# Patient Record
Sex: FEMALE | Race: WHITE | NOT HISPANIC OR LATINO | Employment: FULL TIME | URBAN - METROPOLITAN AREA
[De-identification: names, ages, dates, MRNs, and addresses within clinical notes are randomized per-mention and may not be internally consistent; named-entity substitution may affect disease eponyms.]

---

## 2017-01-19 ENCOUNTER — PRENATAL OFFICE VISIT - HEALTHEAST (OUTPATIENT)
Dept: FAMILY MEDICINE | Facility: CLINIC | Age: 27
End: 2017-01-19

## 2017-01-19 DIAGNOSIS — Z34.90 PREGNANCY: ICD-10-CM

## 2017-01-20 LAB — SYPHILIS RPR SCREEN - HISTORICAL: NORMAL

## 2017-02-02 ENCOUNTER — PRENATAL OFFICE VISIT - HEALTHEAST (OUTPATIENT)
Dept: FAMILY MEDICINE | Facility: CLINIC | Age: 27
End: 2017-02-02

## 2017-02-02 DIAGNOSIS — G56.00 CARPAL TUNNEL SYNDROME: ICD-10-CM

## 2017-02-02 DIAGNOSIS — Z34.90 PREGNANCY: ICD-10-CM

## 2017-02-04 ENCOUNTER — COMMUNICATION - HEALTHEAST (OUTPATIENT)
Dept: FAMILY MEDICINE | Facility: CLINIC | Age: 27
End: 2017-02-04

## 2017-02-06 ENCOUNTER — COMMUNICATION - HEALTHEAST (OUTPATIENT)
Dept: FAMILY MEDICINE | Facility: CLINIC | Age: 27
End: 2017-02-06

## 2017-02-08 ENCOUNTER — OFFICE VISIT - HEALTHEAST (OUTPATIENT)
Dept: FAMILY MEDICINE | Facility: CLINIC | Age: 27
End: 2017-02-08

## 2017-02-08 ENCOUNTER — COMMUNICATION - HEALTHEAST (OUTPATIENT)
Dept: FAMILY MEDICINE | Facility: CLINIC | Age: 27
End: 2017-02-08

## 2017-02-08 DIAGNOSIS — R05.9 COUGH: ICD-10-CM

## 2017-02-09 ENCOUNTER — AMBULATORY - HEALTHEAST (OUTPATIENT)
Dept: FAMILY MEDICINE | Facility: CLINIC | Age: 27
End: 2017-02-09

## 2017-02-09 ENCOUNTER — COMMUNICATION - HEALTHEAST (OUTPATIENT)
Dept: FAMILY MEDICINE | Facility: CLINIC | Age: 27
End: 2017-02-09

## 2017-02-16 ENCOUNTER — PRENATAL OFFICE VISIT - HEALTHEAST (OUTPATIENT)
Dept: FAMILY MEDICINE | Facility: CLINIC | Age: 27
End: 2017-02-16

## 2017-02-16 DIAGNOSIS — Z34.90 PREGNANCY: ICD-10-CM

## 2017-02-24 ENCOUNTER — RECORDS - HEALTHEAST (OUTPATIENT)
Dept: ADMINISTRATIVE | Facility: OTHER | Age: 27
End: 2017-02-24

## 2017-03-03 ENCOUNTER — COMMUNICATION - HEALTHEAST (OUTPATIENT)
Dept: FAMILY MEDICINE | Facility: CLINIC | Age: 27
End: 2017-03-03

## 2017-03-06 ENCOUNTER — PRENATAL OFFICE VISIT - HEALTHEAST (OUTPATIENT)
Dept: FAMILY MEDICINE | Facility: CLINIC | Age: 27
End: 2017-03-06

## 2017-03-06 DIAGNOSIS — Z34.90 PREGNANCY: ICD-10-CM

## 2017-03-20 ENCOUNTER — PRENATAL OFFICE VISIT - HEALTHEAST (OUTPATIENT)
Dept: FAMILY MEDICINE | Facility: CLINIC | Age: 27
End: 2017-03-20

## 2017-03-20 DIAGNOSIS — Z34.90 PREGNANCY: ICD-10-CM

## 2017-04-03 ENCOUNTER — PRENATAL OFFICE VISIT - HEALTHEAST (OUTPATIENT)
Dept: FAMILY MEDICINE | Facility: CLINIC | Age: 27
End: 2017-04-03

## 2017-04-03 DIAGNOSIS — Z34.90 PREGNANCY: ICD-10-CM

## 2017-04-10 ENCOUNTER — PRENATAL OFFICE VISIT - HEALTHEAST (OUTPATIENT)
Dept: FAMILY MEDICINE | Facility: CLINIC | Age: 27
End: 2017-04-10

## 2017-04-10 DIAGNOSIS — Z34.90 PREGNANCY: ICD-10-CM

## 2017-04-17 ENCOUNTER — PRENATAL OFFICE VISIT - HEALTHEAST (OUTPATIENT)
Dept: FAMILY MEDICINE | Facility: CLINIC | Age: 27
End: 2017-04-17

## 2017-04-17 ENCOUNTER — COMMUNICATION - HEALTHEAST (OUTPATIENT)
Dept: SCHEDULING | Facility: CLINIC | Age: 27
End: 2017-04-17

## 2017-04-17 DIAGNOSIS — Z34.90 PREGNANCY: ICD-10-CM

## 2017-04-19 ENCOUNTER — ANESTHESIA - HEALTHEAST (OUTPATIENT)
Dept: OBGYN | Facility: HOSPITAL | Age: 27
End: 2017-04-19

## 2017-04-19 ENCOUNTER — COMMUNICATION - HEALTHEAST (OUTPATIENT)
Dept: OBGYN | Facility: HOSPITAL | Age: 27
End: 2017-04-19

## 2017-04-19 ENCOUNTER — SURGERY - HEALTHEAST (OUTPATIENT)
Dept: OBGYN | Facility: HOSPITAL | Age: 27
End: 2017-04-19

## 2017-04-19 ASSESSMENT — MIFFLIN-ST. JEOR: SCORE: 1488.33

## 2017-04-24 ENCOUNTER — AMBULATORY - HEALTHEAST (OUTPATIENT)
Dept: FAMILY MEDICINE | Facility: CLINIC | Age: 27
End: 2017-04-24

## 2017-04-24 DIAGNOSIS — G56.00 CARPAL TUNNEL SYNDROME: ICD-10-CM

## 2017-04-25 ENCOUNTER — RADIANT APPOINTMENT (OUTPATIENT)
Dept: GENERAL RADIOLOGY | Facility: CLINIC | Age: 27
End: 2017-04-25
Attending: PHYSICIAN ASSISTANT
Payer: COMMERCIAL

## 2017-04-25 ENCOUNTER — COMMUNICATION - HEALTHEAST (OUTPATIENT)
Dept: SCHEDULING | Facility: CLINIC | Age: 27
End: 2017-04-25

## 2017-04-25 ENCOUNTER — OFFICE VISIT (OUTPATIENT)
Dept: URGENT CARE | Facility: URGENT CARE | Age: 27
End: 2017-04-25
Payer: COMMERCIAL

## 2017-04-25 ENCOUNTER — COMMUNICATION - HEALTHEAST (OUTPATIENT)
Dept: OBGYN | Facility: HOSPITAL | Age: 27
End: 2017-04-25

## 2017-04-25 VITALS
WEIGHT: 164 LBS | HEART RATE: 78 BPM | DIASTOLIC BLOOD PRESSURE: 81 MMHG | TEMPERATURE: 98.2 F | SYSTOLIC BLOOD PRESSURE: 122 MMHG

## 2017-04-25 DIAGNOSIS — K59.00 CONSTIPATION, UNSPECIFIED CONSTIPATION TYPE: Primary | ICD-10-CM

## 2017-04-25 DIAGNOSIS — K59.00 CONSTIPATION, UNSPECIFIED CONSTIPATION TYPE: ICD-10-CM

## 2017-04-25 PROCEDURE — 99203 OFFICE O/P NEW LOW 30 MIN: CPT | Performed by: PHYSICIAN ASSISTANT

## 2017-04-25 PROCEDURE — 74020 XR ABDOMEN 2 VW: CPT

## 2017-04-25 RX ORDER — OXYCODONE AND ACETAMINOPHEN 5; 325 MG/1; MG/1
5-325 TABLET ORAL DAILY
Refills: 0 | COMMUNITY
Start: 2017-04-22 | End: 2019-05-17

## 2017-04-25 RX ORDER — CITALOPRAM HYDROBROMIDE 40 MG/1
40 TABLET ORAL DAILY
Refills: 0 | COMMUNITY
Start: 2017-04-22 | End: 2021-09-09

## 2017-04-25 RX ORDER — HYDROXYZINE PAMOATE 50 MG/1
50 CAPSULE ORAL DAILY PRN
Refills: 0 | COMMUNITY
Start: 2017-04-22 | End: 2019-05-17

## 2017-04-25 RX ORDER — IBUPROFEN 600 MG/1
600 TABLET, FILM COATED ORAL DAILY
Refills: 0 | COMMUNITY
Start: 2017-04-22 | End: 2021-03-15

## 2017-04-25 NOTE — MR AVS SNAPSHOT
"              After Visit Summary   2017    Tracey Crawford    MRN: [id number]           Patient Information     Date Of Birth          1990        Visit Information        Provider Department      2017 7:40 PM Ester Gregory PA-C [facility]        Today's Diagnoses     Constipation, unspecified constipation type    -  1       Follow-ups after your visit        Who to contact     If you have questions or need follow up information about today's clinic visit or your schedule please contact [facility] directly at [phone].  Normal or non-critical lab and imaging results will be communicated to you by [employer], letter or phone within 4 business days after the clinic has received the results. If you do not hear from us within 7 days, please contact the clinic through [employer] or phone. If you have a critical or abnormal lab result, we will notify you by phone as soon as possible.  Submit refill requests through [employer] or call your pharmacy and they will forward the refill request to us. Please allow 3 business days for your refill to be completed.          Additional Information About Your Visit        MyChart Information     [employer] lets you send messages to your doctor, view your test results, renew your prescriptions, schedule appointments and more. To sign up, go to [email or web address] . Click on \"Log in\" on the left side of the screen, which will take you to the Welcome page. Then click on \"Sign up Now\" on the right side of the page.     You will be asked to enter the access code listed below, as well as some personal information. Please follow the directions to create your username and password.     Your access code is: [email or web address]  Expires: 2017  8:48 PM     Your access code will  in 90 days. If you need help or a new code, please call your [facility] or [phone].        Care EveryWhere ID     This is your Care EveryWhere ID. This could be " used by other organizations to access your [facility] medical records  [id number]        Your Vitals Were     Pulse Temperature                78 98.2  F (36.8  C) (Oral)           Blood Pressure from Last 3 Encounters:   04/25/17 122/81    Weight from Last 3 Encounters:   04/25/17 164 lb (74.4 kg)                 Today's Medication Changes          These changes are accurate as of: 4/25/17  8:48 PM.  If you have any questions, ask your nurse or doctor.               Start taking these medicines.        Dose/Directions    glycerin (adult) 2 G Supp Suppository   Used for:  Constipation, unspecified constipation type   Started by:  Ester Gregory PA-C        Dose:  1 suppository   Place 1 suppository rectally daily as needed for constipation   Quantity:  1 suppository   Refills:  0            Where to get your medicines      These medications were sent to [facility] [address] [place] [address] AT [address] [address]     Phone:  [phone]     glycerin (adult) 2 G Supp Suppository                Primary Care Provider    None Specified       No primary provider on file.        Thank you!     Thank you for choosing [facility]  for your care. Our goal is always to provide you with excellent care. Hearing back from our patients is one way we can continue to improve our services. Please take a few minutes to complete the written survey that you may receive in the mail after your visit with us. Thank you!             Your Updated Medication List - Protect others around you: Learn how to safely use, store and throw away your medicines at www.disposemymeds.org.          This list is accurate as of: 4/25/17  8:48 PM.  Always use your most recent med list.                   Brand Name Dispense Instructions for use    citalopram 40 MG tablet    celeXA     Take 40 mg by mouth daily       glycerin (adult) 2 G Supp Suppository      1 suppository    Place 1 suppository rectally daily as needed for constipation       hydrOXYzine 50 MG capsule    VISTARIL     Take 50 mg by mouth daily as needed       ibuprofen 600 MG tablet    ADVIL/MOTRIN     Take 600 mg by mouth daily Take 1 tablet by mouth every 6 hours for 10 days       oxyCODONE-acetaminophen 5-325 MG per tablet    PERCOCET     Take 5-325 tablets by mouth daily 1-2 Tablets by mouth every 6 hours

## 2017-04-26 ENCOUNTER — COMMUNICATION - HEALTHEAST (OUTPATIENT)
Dept: FAMILY MEDICINE | Facility: CLINIC | Age: 27
End: 2017-04-26

## 2017-04-26 NOTE — PROGRESS NOTES
SUBJECTIVE:                                                    Tracey Crawford is a 26 year old female who presents to clinic today for the following health issues:      Has not had BM since before C section on 04/19/2017, Tired today painful and unable       Duration: 04/19/2017    Description (location/character/radiation): C Section     Intensity:  mild    Accompanying signs and symptoms: None    History (similar episodes/previous evaluation): None    Precipitating or alleviating factors: None    Therapies tried and outcome: Pain Medication as given      BM 4/19 in the AM. No N/V. Is passing gas. No abdominal pain. On Ibu 600mg, Percocet, Hydroyizine pamoate. Using  About 3 Percocet a day      No Known Allergies    No past medical history on file.      No current outpatient prescriptions on file prior to visit.  No current facility-administered medications on file prior to visit.     Social History   Substance Use Topics     Smoking status: Not on file     Smokeless tobacco: Not on file     Alcohol use Not on file       ROS:  General: negative for fever  Resp: negative for chest pain   CV: negative for chest pain  ABD: as above  : negative for dysuria  Neurologic:negative for Headache    OBJECTIVE:  /81  Pulse 78  Temp 98.2  F (36.8  C) (Oral)  Wt 164 lb (74.4 kg)   General:   awake, alert, and cooperative.  NAD.   Head: Normocephalic, atraumatic.  Eyes: Conjunctiva clear, non icteric.   Heart: Regular rate and rhythm. No murmur.  Lungs: Chest is clear; no wheezes or rales.  ABD: soft, no tenderness to palpation , no rigidity, guarding or rebound , bowel sounds intact  Neuro: Alert and oriented - normal speech.   Xray- moderate stool, no obstruction  ASSESSMENT:well appearing    ICD-10-CM    1. Constipation, unspecified constipation type K59.00 XR Abdomen 2 Views       PLAN: Glycerin suppository. DC Atarax. Prune juice. Call her OB tomorrow.       Advised about symptoms which might herald more serious  problems.        Ester Gregory PA-C

## 2017-04-26 NOTE — NURSING NOTE
Chief Complaint   Patient presents with     Abdominal Pain     Has not had a BM since before C section on 04/19/2017        Initial /81  Pulse 78  Temp 98.2  F (36.8  C) (Oral)  Wt 164 lb (74.4 kg) There is no height or weight on file to calculate BMI..  BP completed using cuff size: regular        Michelle Bryant MA

## 2017-05-24 ENCOUNTER — COMMUNICATION - HEALTHEAST (OUTPATIENT)
Dept: OBGYN | Facility: HOSPITAL | Age: 27
End: 2017-05-24

## 2017-06-02 ENCOUNTER — COMMUNICATION - HEALTHEAST (OUTPATIENT)
Dept: OBGYN | Facility: HOSPITAL | Age: 27
End: 2017-06-02

## 2017-06-02 ENCOUNTER — COMMUNICATION - HEALTHEAST (OUTPATIENT)
Dept: FAMILY MEDICINE | Facility: CLINIC | Age: 27
End: 2017-06-02

## 2017-06-05 ENCOUNTER — COMMUNICATION - HEALTHEAST (OUTPATIENT)
Dept: FAMILY MEDICINE | Facility: CLINIC | Age: 27
End: 2017-06-05

## 2017-06-07 ENCOUNTER — OFFICE VISIT - HEALTHEAST (OUTPATIENT)
Dept: FAMILY MEDICINE | Facility: CLINIC | Age: 27
End: 2017-06-07

## 2017-06-07 ENCOUNTER — COMMUNICATION - HEALTHEAST (OUTPATIENT)
Dept: TELEHEALTH | Facility: CLINIC | Age: 27
End: 2017-06-07

## 2017-06-07 ENCOUNTER — COMMUNICATION - HEALTHEAST (OUTPATIENT)
Dept: HEALTH INFORMATION MANAGEMENT | Facility: CLINIC | Age: 27
End: 2017-06-07

## 2017-06-07 DIAGNOSIS — D22.9 ATYPICAL NEVI: ICD-10-CM

## 2017-06-10 LAB
HPV INTERPRETATION - HISTORICAL: NORMAL
HPV INTERPRETER - HISTORICAL: NORMAL

## 2017-06-14 ENCOUNTER — COMMUNICATION - HEALTHEAST (OUTPATIENT)
Dept: FAMILY MEDICINE | Facility: CLINIC | Age: 27
End: 2017-06-14

## 2017-06-14 LAB
BKR LAB AP ABNORMAL BLEEDING: NO
BKR LAB AP BIRTH CONTROL/HORMONES: NORMAL
BKR LAB AP CERVICAL APPEARANCE: NORMAL
BKR LAB AP GYN ADEQUACY: NORMAL
BKR LAB AP GYN INTERPRETATION: NORMAL
BKR LAB AP HPV REFLEX: NORMAL
BKR LAB AP LMP: NORMAL
BKR LAB AP PATIENT STATUS: NORMAL
BKR LAB AP PREVIOUS ABNORMAL: NO
BKR LAB AP PREVIOUS NORMAL: NORMAL
HIGH RISK?: NO
PATH REPORT.COMMENTS IMP SPEC: NORMAL
RESULT FLAG (HE HISTORICAL CONVERSION): NORMAL

## 2017-06-23 ENCOUNTER — COMMUNICATION - HEALTHEAST (OUTPATIENT)
Dept: FAMILY MEDICINE | Facility: CLINIC | Age: 27
End: 2017-06-23

## 2017-06-23 DIAGNOSIS — F32.A DEPRESSION: ICD-10-CM

## 2017-07-12 ENCOUNTER — COMMUNICATION - HEALTHEAST (OUTPATIENT)
Dept: FAMILY MEDICINE | Facility: CLINIC | Age: 27
End: 2017-07-12

## 2017-09-24 ENCOUNTER — COMMUNICATION - HEALTHEAST (OUTPATIENT)
Dept: FAMILY MEDICINE | Facility: CLINIC | Age: 27
End: 2017-09-24

## 2018-01-08 ENCOUNTER — COMMUNICATION - HEALTHEAST (OUTPATIENT)
Dept: FAMILY MEDICINE | Facility: CLINIC | Age: 28
End: 2018-01-08

## 2018-02-25 ENCOUNTER — COMMUNICATION - HEALTHEAST (OUTPATIENT)
Dept: FAMILY MEDICINE | Facility: CLINIC | Age: 28
End: 2018-02-25

## 2018-02-25 DIAGNOSIS — F32.A DEPRESSION: ICD-10-CM

## 2018-03-26 ENCOUNTER — COMMUNICATION - HEALTHEAST (OUTPATIENT)
Dept: FAMILY MEDICINE | Facility: CLINIC | Age: 28
End: 2018-03-26

## 2018-03-28 ENCOUNTER — AMBULATORY - HEALTHEAST (OUTPATIENT)
Dept: FAMILY MEDICINE | Facility: CLINIC | Age: 28
End: 2018-03-28

## 2018-04-12 ENCOUNTER — OFFICE VISIT - HEALTHEAST (OUTPATIENT)
Dept: FAMILY MEDICINE | Facility: CLINIC | Age: 28
End: 2018-04-12

## 2018-04-12 DIAGNOSIS — Z00.00 HEALTH MAINTENANCE EXAMINATION: ICD-10-CM

## 2018-06-18 ENCOUNTER — COMMUNICATION - HEALTHEAST (OUTPATIENT)
Dept: FAMILY MEDICINE | Facility: CLINIC | Age: 28
End: 2018-06-18

## 2018-06-18 DIAGNOSIS — F32.A DEPRESSION: ICD-10-CM

## 2018-07-18 ENCOUNTER — COMMUNICATION - HEALTHEAST (OUTPATIENT)
Dept: FAMILY MEDICINE | Facility: CLINIC | Age: 28
End: 2018-07-18

## 2018-09-04 ENCOUNTER — COMMUNICATION - HEALTHEAST (OUTPATIENT)
Dept: FAMILY MEDICINE | Facility: CLINIC | Age: 28
End: 2018-09-04

## 2018-10-08 ENCOUNTER — COMMUNICATION - HEALTHEAST (OUTPATIENT)
Dept: FAMILY MEDICINE | Facility: CLINIC | Age: 28
End: 2018-10-08

## 2018-10-10 ENCOUNTER — COMMUNICATION - HEALTHEAST (OUTPATIENT)
Dept: FAMILY MEDICINE | Facility: CLINIC | Age: 28
End: 2018-10-10

## 2018-10-10 ENCOUNTER — OFFICE VISIT - HEALTHEAST (OUTPATIENT)
Dept: FAMILY MEDICINE | Facility: CLINIC | Age: 28
End: 2018-10-10

## 2018-10-10 DIAGNOSIS — N92.6 MISSED MENSES: ICD-10-CM

## 2018-10-10 DIAGNOSIS — O26.859 SPOTTING AFFECTING PREGNANCY: ICD-10-CM

## 2018-10-10 LAB — HCG UR QL: POSITIVE

## 2018-10-12 LAB
B19V IGG SER IA-ACNC: 3.56 IV
B19V IGM SER IA-ACNC: 0.14 IV

## 2018-10-23 ENCOUNTER — COMMUNICATION - HEALTHEAST (OUTPATIENT)
Dept: FAMILY MEDICINE | Facility: CLINIC | Age: 28
End: 2018-10-23

## 2018-10-24 ENCOUNTER — OFFICE VISIT - HEALTHEAST (OUTPATIENT)
Dept: FAMILY MEDICINE | Facility: CLINIC | Age: 28
End: 2018-10-24

## 2018-10-24 DIAGNOSIS — R42 LIGHTHEADEDNESS: ICD-10-CM

## 2018-10-24 DIAGNOSIS — Z3A.12 12 WEEKS GESTATION OF PREGNANCY: ICD-10-CM

## 2018-10-24 LAB
BASOPHILS # BLD AUTO: 0.1 THOU/UL (ref 0–0.2)
BASOPHILS NFR BLD AUTO: 1 % (ref 0–2)
EOSINOPHIL # BLD AUTO: 0.2 THOU/UL (ref 0–0.4)
EOSINOPHIL NFR BLD AUTO: 2 % (ref 0–6)
ERYTHROCYTE [DISTWIDTH] IN BLOOD BY AUTOMATED COUNT: 11.5 % (ref 11–14.5)
HCT VFR BLD AUTO: 37.6 % (ref 35–47)
HGB BLD-MCNC: 12.9 G/DL (ref 12–16)
LYMPHOCYTES # BLD AUTO: 1.9 THOU/UL (ref 0.8–4.4)
LYMPHOCYTES NFR BLD AUTO: 20 % (ref 20–40)
MCH RBC QN AUTO: 30.3 PG (ref 27–34)
MCHC RBC AUTO-ENTMCNC: 34.2 G/DL (ref 32–36)
MCV RBC AUTO: 89 FL (ref 80–100)
MONOCYTES # BLD AUTO: 0.6 THOU/UL (ref 0–0.9)
MONOCYTES NFR BLD AUTO: 6 % (ref 2–10)
NEUTROPHILS # BLD AUTO: 6.6 THOU/UL (ref 2–7.7)
NEUTROPHILS NFR BLD AUTO: 71 % (ref 50–70)
PLATELET # BLD AUTO: 246 THOU/UL (ref 140–440)
PMV BLD AUTO: 7.4 FL (ref 7–10)
RBC # BLD AUTO: 4.24 MILL/UL (ref 3.8–5.4)
TSH SERPL DL<=0.005 MIU/L-ACNC: 1.31 UIU/ML (ref 0.3–5)
WBC: 9.3 THOU/UL (ref 4–11)

## 2018-11-02 ENCOUNTER — HOSPITAL ENCOUNTER (OUTPATIENT)
Dept: ULTRASOUND IMAGING | Facility: CLINIC | Age: 28
Discharge: HOME | End: 2018-11-02
Attending: FAMILY MEDICINE

## 2018-11-02 DIAGNOSIS — O26.859 SPOTTING AFFECTING PREGNANCY: ICD-10-CM

## 2018-11-11 ENCOUNTER — COMMUNICATION - HEALTHEAST (OUTPATIENT)
Dept: FAMILY MEDICINE | Facility: CLINIC | Age: 28
End: 2018-11-11

## 2018-11-15 ENCOUNTER — AMBULATORY - HEALTHEAST (OUTPATIENT)
Dept: FAMILY MEDICINE | Facility: CLINIC | Age: 28
End: 2018-11-15

## 2018-11-15 DIAGNOSIS — Z33.1 PREGNANT STATE, INCIDENTAL: ICD-10-CM

## 2018-11-26 ENCOUNTER — PRENATAL OFFICE VISIT - HEALTHEAST (OUTPATIENT)
Dept: FAMILY MEDICINE | Facility: CLINIC | Age: 28
End: 2018-11-26

## 2018-11-26 DIAGNOSIS — Z33.1 PREGNANCY, INCIDENTAL: ICD-10-CM

## 2018-11-26 LAB
ALBUMIN UR-MCNC: NEGATIVE MG/DL
APPEARANCE UR: CLEAR
BILIRUB UR QL STRIP: NEGATIVE
CLUE CELLS: NORMAL
COLOR UR AUTO: YELLOW
GLUCOSE UR STRIP-MCNC: NEGATIVE MG/DL
HGB UR QL STRIP: NEGATIVE
HIV 1+2 AB+HIV1 P24 AG SERPL QL IA: NEGATIVE
KETONES UR STRIP-MCNC: NEGATIVE MG/DL
LEUKOCYTE ESTERASE UR QL STRIP: NEGATIVE
NITRATE UR QL: NEGATIVE
PH UR STRIP: 6.5 [PH] (ref 5–8)
SP GR UR STRIP: 1.02 (ref 1–1.03)
TRICHOMONAS, WET PREP: NORMAL
UROBILINOGEN UR STRIP-ACNC: NORMAL
YEAST, WET PREP: NORMAL

## 2018-11-26 ASSESSMENT — MIFFLIN-ST. JEOR: SCORE: 1399.43

## 2018-11-27 LAB
ABO/RH(D): NORMAL
ABORH REPEAT: NORMAL
ANTIBODY SCREEN: NEGATIVE
BACTERIA SPEC CULT: NO GROWTH
C TRACH DNA SPEC QL PROBE+SIG AMP: NEGATIVE
HBV SURFACE AG SERPL QL IA: NEGATIVE
N GONORRHOEA DNA SPEC QL NAA+PROBE: NEGATIVE
RUBV IGG SERPL QL IA: POSITIVE
T PALLIDUM AB SER QL: NEGATIVE

## 2018-12-04 ENCOUNTER — COMMUNICATION - HEALTHEAST (OUTPATIENT)
Dept: FAMILY MEDICINE | Facility: CLINIC | Age: 28
End: 2018-12-04

## 2018-12-19 ENCOUNTER — PRENATAL OFFICE VISIT - HEALTHEAST (OUTPATIENT)
Dept: FAMILY MEDICINE | Facility: CLINIC | Age: 28
End: 2018-12-19

## 2018-12-19 ENCOUNTER — RECORDS - HEALTHEAST (OUTPATIENT)
Dept: ADMINISTRATIVE | Facility: OTHER | Age: 28
End: 2018-12-19

## 2018-12-19 DIAGNOSIS — Z3A.21 21 WEEKS GESTATION OF PREGNANCY: ICD-10-CM

## 2019-01-04 ENCOUNTER — COMMUNICATION - HEALTHEAST (OUTPATIENT)
Dept: FAMILY MEDICINE | Facility: CLINIC | Age: 29
End: 2019-01-04

## 2019-01-16 ENCOUNTER — COMMUNICATION - HEALTHEAST (OUTPATIENT)
Dept: FAMILY MEDICINE | Facility: CLINIC | Age: 29
End: 2019-01-16

## 2019-01-16 ENCOUNTER — PRENATAL OFFICE VISIT - HEALTHEAST (OUTPATIENT)
Dept: FAMILY MEDICINE | Facility: CLINIC | Age: 29
End: 2019-01-16

## 2019-01-16 DIAGNOSIS — Z34.90 PREGNANCY, UNSPECIFIED GESTATIONAL AGE: ICD-10-CM

## 2019-01-16 LAB
FASTING STATUS PATIENT QL REPORTED: YES
GLUCOSE 1H P 50 G GLC PO SERPL-MCNC: 70 MG/DL (ref 70–139)
HGB BLD-MCNC: 11.8 G/DL (ref 12–16)

## 2019-01-17 LAB — T PALLIDUM AB SER QL: NEGATIVE

## 2019-01-29 ENCOUNTER — COMMUNICATION - HEALTHEAST (OUTPATIENT)
Dept: FAMILY MEDICINE | Facility: CLINIC | Age: 29
End: 2019-01-29

## 2019-01-29 DIAGNOSIS — F32.A DEPRESSION: ICD-10-CM

## 2019-02-06 ENCOUNTER — PRENATAL OFFICE VISIT - HEALTHEAST (OUTPATIENT)
Dept: FAMILY MEDICINE | Facility: CLINIC | Age: 29
End: 2019-02-06

## 2019-02-06 DIAGNOSIS — Z34.90 PREGNANCY, UNSPECIFIED GESTATIONAL AGE: ICD-10-CM

## 2019-02-11 ENCOUNTER — COMMUNICATION - HEALTHEAST (OUTPATIENT)
Dept: FAMILY MEDICINE | Facility: CLINIC | Age: 29
End: 2019-02-11

## 2019-02-20 ENCOUNTER — PRENATAL OFFICE VISIT - HEALTHEAST (OUTPATIENT)
Dept: FAMILY MEDICINE | Facility: CLINIC | Age: 29
End: 2019-02-20

## 2019-02-20 DIAGNOSIS — Z34.90 PREGNANCY, UNSPECIFIED GESTATIONAL AGE: ICD-10-CM

## 2019-02-20 DIAGNOSIS — D22.9 ATYPICAL NEVI: ICD-10-CM

## 2019-02-20 LAB
ALBUMIN UR-MCNC: ABNORMAL MG/DL
APPEARANCE UR: CLEAR
BILIRUB UR QL STRIP: NEGATIVE
COLOR UR AUTO: YELLOW
GLUCOSE UR STRIP-MCNC: NEGATIVE MG/DL
HGB UR QL STRIP: NEGATIVE
KETONES UR STRIP-MCNC: NEGATIVE MG/DL
LEUKOCYTE ESTERASE UR QL STRIP: ABNORMAL
NITRATE UR QL: NEGATIVE
PH UR STRIP: 7 [PH] (ref 5–8)
SP GR UR STRIP: 1.02 (ref 1–1.03)
UROBILINOGEN UR STRIP-ACNC: ABNORMAL

## 2019-03-01 ENCOUNTER — OFFICE VISIT - HEALTHEAST (OUTPATIENT)
Dept: FAMILY MEDICINE | Facility: CLINIC | Age: 29
End: 2019-03-01

## 2019-03-01 ENCOUNTER — COMMUNICATION - HEALTHEAST (OUTPATIENT)
Dept: SCHEDULING | Facility: CLINIC | Age: 29
End: 2019-03-01

## 2019-03-01 DIAGNOSIS — O22.13: ICD-10-CM

## 2019-03-07 ENCOUNTER — COMMUNICATION - HEALTHEAST (OUTPATIENT)
Dept: FAMILY MEDICINE | Facility: CLINIC | Age: 29
End: 2019-03-07

## 2019-03-07 ENCOUNTER — PRENATAL OFFICE VISIT - HEALTHEAST (OUTPATIENT)
Dept: FAMILY MEDICINE | Facility: CLINIC | Age: 29
End: 2019-03-07

## 2019-03-07 DIAGNOSIS — O22.13: ICD-10-CM

## 2019-03-07 DIAGNOSIS — Z34.90 PREGNANCY, UNSPECIFIED GESTATIONAL AGE: ICD-10-CM

## 2019-03-07 LAB
ALBUMIN UR-MCNC: NEGATIVE MG/DL
APPEARANCE UR: CLEAR
BILIRUB UR QL STRIP: NEGATIVE
COLOR UR AUTO: YELLOW
GLUCOSE UR STRIP-MCNC: NEGATIVE MG/DL
HGB UR QL STRIP: NEGATIVE
KETONES UR STRIP-MCNC: NEGATIVE MG/DL
LEUKOCYTE ESTERASE UR QL STRIP: ABNORMAL
NITRATE UR QL: NEGATIVE
PH UR STRIP: 7 [PH] (ref 5–8)
SP GR UR STRIP: 1.01 (ref 1–1.03)
UROBILINOGEN UR STRIP-ACNC: ABNORMAL

## 2019-04-03 ENCOUNTER — PRENATAL OFFICE VISIT - HEALTHEAST (OUTPATIENT)
Dept: FAMILY MEDICINE | Facility: CLINIC | Age: 29
End: 2019-04-03

## 2019-04-03 DIAGNOSIS — Z34.90 PREGNANCY, UNSPECIFIED GESTATIONAL AGE: ICD-10-CM

## 2019-04-03 LAB
ALBUMIN UR-MCNC: NEGATIVE MG/DL
APPEARANCE UR: CLEAR
BILIRUB UR QL STRIP: NEGATIVE
COLOR UR AUTO: YELLOW
GLUCOSE UR STRIP-MCNC: NEGATIVE MG/DL
HGB BLD-MCNC: 12.9 G/DL (ref 12–16)
HGB UR QL STRIP: NEGATIVE
KETONES UR STRIP-MCNC: NEGATIVE MG/DL
LEUKOCYTE ESTERASE UR QL STRIP: ABNORMAL
NITRATE UR QL: NEGATIVE
PH UR STRIP: 7 [PH] (ref 5–8)
SP GR UR STRIP: 1.02 (ref 1–1.03)
UROBILINOGEN UR STRIP-ACNC: ABNORMAL

## 2019-04-04 LAB
ALLERGIC TO PENICILLIN: NO
GP B STREP DNA SPEC QL NAA+PROBE: NEGATIVE

## 2019-04-10 ENCOUNTER — PRENATAL OFFICE VISIT - HEALTHEAST (OUTPATIENT)
Dept: FAMILY MEDICINE | Facility: CLINIC | Age: 29
End: 2019-04-10

## 2019-04-10 DIAGNOSIS — Z3A.37 37 WEEKS GESTATION OF PREGNANCY: ICD-10-CM

## 2019-04-17 ENCOUNTER — PRENATAL OFFICE VISIT - HEALTHEAST (OUTPATIENT)
Dept: FAMILY MEDICINE | Facility: CLINIC | Age: 29
End: 2019-04-17

## 2019-04-17 DIAGNOSIS — Z34.90 PREGNANCY, UNSPECIFIED GESTATIONAL AGE: ICD-10-CM

## 2019-04-17 LAB
ALBUMIN UR-MCNC: NEGATIVE MG/DL
APPEARANCE UR: CLEAR
BILIRUB UR QL STRIP: NEGATIVE
COLOR UR AUTO: YELLOW
GLUCOSE UR STRIP-MCNC: NEGATIVE MG/DL
HGB UR QL STRIP: NEGATIVE
KETONES UR STRIP-MCNC: NEGATIVE MG/DL
LEUKOCYTE ESTERASE UR QL STRIP: ABNORMAL
NITRATE UR QL: NEGATIVE
PH UR STRIP: 7 [PH] (ref 5–8)
SP GR UR STRIP: 1.02 (ref 1–1.03)
UROBILINOGEN UR STRIP-ACNC: ABNORMAL

## 2019-04-24 ENCOUNTER — PRENATAL OFFICE VISIT - HEALTHEAST (OUTPATIENT)
Dept: FAMILY MEDICINE | Facility: CLINIC | Age: 29
End: 2019-04-24

## 2019-04-24 DIAGNOSIS — Z34.90 PREGNANCY, UNSPECIFIED GESTATIONAL AGE: ICD-10-CM

## 2019-04-24 DIAGNOSIS — O48.0 POST-TERM PREGNANCY, 40-42 WEEKS OF GESTATION: ICD-10-CM

## 2019-04-29 ENCOUNTER — ANESTHESIA - HEALTHEAST (OUTPATIENT)
Dept: OBGYN | Facility: HOSPITAL | Age: 29
End: 2019-04-29

## 2019-04-30 ENCOUNTER — HOME CARE/HOSPICE - HEALTHEAST (OUTPATIENT)
Dept: HOME HEALTH SERVICES | Facility: HOME HEALTH | Age: 29
End: 2019-04-30

## 2019-05-13 ENCOUNTER — AMBULATORY - HEALTHEAST (OUTPATIENT)
Dept: FAMILY MEDICINE | Facility: CLINIC | Age: 29
End: 2019-05-13

## 2019-05-13 ENCOUNTER — OFFICE VISIT - HEALTHEAST (OUTPATIENT)
Dept: FAMILY MEDICINE | Facility: CLINIC | Age: 29
End: 2019-05-13

## 2019-05-13 DIAGNOSIS — N61.0 MASTITIS: ICD-10-CM

## 2019-05-17 ENCOUNTER — OFFICE VISIT (OUTPATIENT)
Dept: URGENT CARE | Facility: URGENT CARE | Age: 29
End: 2019-05-17
Payer: COMMERCIAL

## 2019-05-17 VITALS
DIASTOLIC BLOOD PRESSURE: 79 MMHG | HEART RATE: 80 BPM | TEMPERATURE: 98.7 F | WEIGHT: 157 LBS | OXYGEN SATURATION: 100 % | SYSTOLIC BLOOD PRESSURE: 111 MMHG

## 2019-05-17 DIAGNOSIS — R30.0 DYSURIA: Primary | ICD-10-CM

## 2019-05-17 LAB
ALBUMIN UR-MCNC: NEGATIVE MG/DL
APPEARANCE UR: ABNORMAL
BACTERIA #/AREA URNS HPF: ABNORMAL /HPF
BILIRUB UR QL STRIP: NEGATIVE
COLOR UR AUTO: YELLOW
GLUCOSE UR STRIP-MCNC: NEGATIVE MG/DL
HGB UR QL STRIP: ABNORMAL
KETONES UR STRIP-MCNC: NEGATIVE MG/DL
LEUKOCYTE ESTERASE UR QL STRIP: ABNORMAL
NITRATE UR QL: NEGATIVE
NON-SQ EPI CELLS #/AREA URNS LPF: ABNORMAL /LPF
PH UR STRIP: 6.5 PH (ref 5–7)
RBC #/AREA URNS AUTO: ABNORMAL /HPF
SOURCE: ABNORMAL
SP GR UR STRIP: <=1.005 (ref 1–1.03)
TRANS CELLS #/AREA URNS HPF: ABNORMAL /HPF
UROBILINOGEN UR STRIP-ACNC: 0.2 EU/DL (ref 0.2–1)
WBC #/AREA URNS AUTO: ABNORMAL /HPF
WBC CLUMPS #/AREA URNS HPF: PRESENT /HPF

## 2019-05-17 PROCEDURE — 99213 OFFICE O/P EST LOW 20 MIN: CPT | Performed by: PHYSICIAN ASSISTANT

## 2019-05-17 PROCEDURE — 81001 URINALYSIS AUTO W/SCOPE: CPT | Performed by: PHYSICIAN ASSISTANT

## 2019-05-17 PROCEDURE — 87186 SC STD MICRODIL/AGAR DIL: CPT | Performed by: PHYSICIAN ASSISTANT

## 2019-05-17 PROCEDURE — 87088 URINE BACTERIA CULTURE: CPT | Performed by: PHYSICIAN ASSISTANT

## 2019-05-17 PROCEDURE — 87086 URINE CULTURE/COLONY COUNT: CPT | Performed by: PHYSICIAN ASSISTANT

## 2019-05-17 RX ORDER — DOCUSATE SODIUM 100 MG/1
100 CAPSULE, LIQUID FILLED ORAL
COMMUNITY
Start: 2019-05-01 | End: 2021-03-15

## 2019-05-17 RX ORDER — NITROFURANTOIN 25; 75 MG/1; MG/1
100 CAPSULE ORAL 2 TIMES DAILY
Qty: 14 CAPSULE | Refills: 0 | Status: SHIPPED | OUTPATIENT
Start: 2019-05-17 | End: 2021-03-15

## 2019-05-17 RX ORDER — DICLOXACILLIN SODIUM 500 MG
500 CAPSULE ORAL
COMMUNITY
Start: 2019-05-13 | End: 2019-05-17

## 2019-05-18 NOTE — PROGRESS NOTES
SUBJECTIVE:   Tracey Crawford is a 29 year old female who  presents today for a possible UTI. Symptoms of dysuria, urgency and frequency have been going on for 2hour(s).  Hematuria no.  sudden onsetand moderate.  There is no history of fever, chills, nausea or vomiting.  No history of vaginal discharge. This patient does  have a history of urinary tract infections. Patient denies long duration, rigors and flank pain or vaginal discharge, vaginal odor and vaginal itching. Recently delivered and was treated with dicloxacillin for mastitis recently. No breast pain currently.     History reviewed. No pertinent past medical history.  Current Outpatient Medications   Medication Sig Dispense Refill     citalopram (CELEXA) 40 MG tablet Take 40 mg by mouth daily  0     dicloxacillin (DYNAPEN) 500 MG capsule Take 500 mg by mouth       docusate sodium (COLACE) 100 MG capsule Take 100 mg by mouth       ibuprofen (ADVIL/MOTRIN) 600 MG tablet Take 600 mg by mouth daily Take 1 tablet by mouth every 6 hours for 10 days  0     glycerin, adult, 2 G SUPP Suppository Place 1 suppository rectally daily as needed for constipation (Patient not taking: Reported on 5/17/2019) 1 suppository 0     hydrOXYzine (VISTARIL) 50 MG capsule Take 50 mg by mouth daily as needed  0     IRON PO Take 1 tablet by mouth       oxyCODONE-acetaminophen (PERCOCET) 5-325 MG per tablet Take 5-325 tablets by mouth daily 1-2 Tablets by mouth every 6 hours  0     Social History     Tobacco Use     Smoking status: Never Smoker     Smokeless tobacco: Never Used   Substance Use Topics     Alcohol use: Not on file       ROS:   CONSTITUTIONAL:NEGATIVE for fever, chills, change in weight  : dysuria, frequency  and urgency    OBJECTIVE:  /79   Pulse 80   Temp 98.7  F (37.1  C) (Oral)   Wt 71.2 kg (157 lb)   SpO2 100%   Breastfeeding? Yes   GENERAL APPEARANCE: healthy, alert and no distress  RESP: lungs clear to auscultation - no rales, rhonchi or wheezes  CV:  regular rates and rhythm, normal S1 S2, no murmur noted  ABDOMEN:  soft, nontender, no HSM or masses and bowel sounds normal  BACK: No CVA tenderness  SKIN: no suspicious lesions or rashes    ASSESSMENT:     1. Dysuria    - UA reflex to Microscopic and Culture  - Urine Microscopic  - Urine Culture Aerobic Bacterial  - nitroFURantoin macrocrystal-monohydrate (MACROBID) 100 MG capsule; Take 1 capsule (100 mg) by mouth 2 times daily  Dispense: 14 capsule; Refill: 0    PLAN: stop dicloxacillin and start nitrofurantoin.     As per ordered above  Drink plenty of fluids.  Prevention and treatment of UTI's discussed.Signs and symptoms of pyelonephritis mentioned.  Follow up with primary care physician if not improving

## 2019-05-20 LAB
BACTERIA SPEC CULT: ABNORMAL
SPECIMEN SOURCE: ABNORMAL

## 2019-05-29 ENCOUNTER — OFFICE VISIT - HEALTHEAST (OUTPATIENT)
Dept: FAMILY MEDICINE | Facility: CLINIC | Age: 29
End: 2019-05-29

## 2019-05-29 DIAGNOSIS — N61.0 MASTITIS: ICD-10-CM

## 2019-07-22 ENCOUNTER — OFFICE VISIT - HEALTHEAST (OUTPATIENT)
Dept: FAMILY MEDICINE | Facility: CLINIC | Age: 29
End: 2019-07-22

## 2019-07-22 DIAGNOSIS — N39.46 MIXED INCONTINENCE URGE AND STRESS (MALE)(FEMALE): ICD-10-CM

## 2019-07-22 DIAGNOSIS — D22.9 ATYPICAL NEVI: ICD-10-CM

## 2019-07-22 DIAGNOSIS — L30.9 ECZEMA, UNSPECIFIED TYPE: ICD-10-CM

## 2019-07-22 LAB
ALBUMIN UR-MCNC: NEGATIVE MG/DL
APPEARANCE UR: CLEAR
BILIRUB UR QL STRIP: NEGATIVE
COLOR UR AUTO: YELLOW
GLUCOSE UR STRIP-MCNC: NEGATIVE MG/DL
HGB UR QL STRIP: NEGATIVE
KETONES UR STRIP-MCNC: NEGATIVE MG/DL
LEUKOCYTE ESTERASE UR QL STRIP: NEGATIVE
NITRATE UR QL: NEGATIVE
PH UR STRIP: 6.5 [PH] (ref 5–8)
SP GR UR STRIP: 1.01 (ref 1–1.03)
UROBILINOGEN UR STRIP-ACNC: NORMAL

## 2019-07-24 LAB
HPV SOURCE: NORMAL
HUMAN PAPILLOMA VIRUS 16 DNA: NEGATIVE
HUMAN PAPILLOMA VIRUS 18 DNA: NEGATIVE
HUMAN PAPILLOMA VIRUS FINAL DIAGNOSIS: NORMAL
HUMAN PAPILLOMA VIRUS OTHER HR: NEGATIVE
SPECIMEN DESCRIPTION: NORMAL

## 2019-09-10 ENCOUNTER — TRANSFERRED RECORDS (OUTPATIENT)
Dept: HEALTH INFORMATION MANAGEMENT | Facility: CLINIC | Age: 29
End: 2019-09-10
Payer: COMMERCIAL

## 2019-09-10 ENCOUNTER — COMMUNICATION - HEALTHEAST (OUTPATIENT)
Dept: FAMILY MEDICINE | Facility: CLINIC | Age: 29
End: 2019-09-10

## 2019-09-10 ENCOUNTER — RECORDS - HEALTHEAST (OUTPATIENT)
Dept: ADMINISTRATIVE | Facility: OTHER | Age: 29
End: 2019-09-10

## 2019-09-10 DIAGNOSIS — F32.A DEPRESSION: ICD-10-CM

## 2019-10-04 ENCOUNTER — COMMUNICATION - HEALTHEAST (OUTPATIENT)
Dept: FAMILY MEDICINE | Facility: CLINIC | Age: 29
End: 2019-10-04

## 2019-10-04 DIAGNOSIS — F32.A DEPRESSION: ICD-10-CM

## 2019-10-29 ENCOUNTER — TRANSFERRED RECORDS (OUTPATIENT)
Dept: HEALTH INFORMATION MANAGEMENT | Facility: CLINIC | Age: 29
End: 2019-10-29
Payer: COMMERCIAL

## 2020-02-10 ENCOUNTER — COMMUNICATION - HEALTHEAST (OUTPATIENT)
Dept: FAMILY MEDICINE | Facility: CLINIC | Age: 30
End: 2020-02-10

## 2020-02-10 DIAGNOSIS — F32.A DEPRESSION: ICD-10-CM

## 2020-02-26 ENCOUNTER — OFFICE VISIT - HEALTHEAST (OUTPATIENT)
Dept: FAMILY MEDICINE | Facility: CLINIC | Age: 30
End: 2020-02-26

## 2020-02-26 DIAGNOSIS — F98.8 ATTENTION DEFICIT DISORDER (ADD) WITHOUT HYPERACTIVITY: ICD-10-CM

## 2020-02-26 LAB
AMPHETAMINES UR QL SCN: NORMAL
BARBITURATES UR QL: NORMAL
BENZODIAZ UR QL: NORMAL
CANNABINOIDS UR QL SCN: NORMAL
COCAINE UR QL: NORMAL
CREAT UR-MCNC: 142.8 MG/DL
METHADONE UR QL SCN: NORMAL
OPIATES UR QL SCN: NORMAL
OXYCODONE UR QL: NORMAL
PCP UR QL SCN: NORMAL

## 2020-02-26 ASSESSMENT — MIFFLIN-ST. JEOR: SCORE: 1445.24

## 2020-04-06 ENCOUNTER — COMMUNICATION - HEALTHEAST (OUTPATIENT)
Dept: FAMILY MEDICINE | Facility: CLINIC | Age: 30
End: 2020-04-06

## 2020-04-06 DIAGNOSIS — F98.8 ATTENTION DEFICIT DISORDER (ADD) WITHOUT HYPERACTIVITY: ICD-10-CM

## 2020-05-06 ENCOUNTER — VIRTUAL VISIT (OUTPATIENT)
Dept: FAMILY MEDICINE | Facility: OTHER | Age: 30
End: 2020-05-06

## 2020-05-06 ENCOUNTER — COMMUNICATION - HEALTHEAST (OUTPATIENT)
Dept: SCHEDULING | Facility: CLINIC | Age: 30
End: 2020-05-06

## 2020-05-06 NOTE — PROGRESS NOTES
"Date: 2020 18:41:35  Clinician: Jazmin Lane  Clinician NPI: [id number]  Patient: Tracey Crawford  Patient : 1990  Patient Address: [address] [place] [address]  Patient Phone: [phone]  Visit Protocol: UTI  Patient Summary:  Tracey is a 29 year old ( : 1990 ) female who initiated a Visit for a presumed bladder infection. When asked the question \"Please sign me up to receive news, health information and promotions. \", Tracey responded \"No\".   Her symptoms started 1-3 days ago and consist of dysuria, feeling as if the bladder is never empty, urinary frequency, and urgency.   Symptom details   Urine color: The color of her urine is yellow.    Denied symptoms include foul-smelling urine, nausea, flank pain, abdominal pain, chills, vaginal discharge, urinary incontinence, vomiting, and vaginal itching. She does not feel feverish.   Tracey has not used any over-the-counter medications or home remedies to relieve her current symptoms.  Precipitating events  Tracey denies having a sexually transmitted disease.  Pertinent medical history  Tracey has had a bladder infection before and has had 1 in the past 12 months. Her most recent bladder infection was not within the last 4 weeks. Her current symptoms are similar to her previous bladder infection symptoms.   She is not sure what antibiotics have been effective in treating her past bladder infections.   Tracey has not been prescribed antibiotics to prevent frequent or repeated bladder infections in the past and does not get yeast infections when she takes antibiotics. She has not experienced problems or side effects with any of the common antibiotics used to treat bladder infections.   Tracey does not have a history of kidney stones. She has not used a catheter or been a patient in a hospital or nursing home in the past 2 weeks.   Tracey does not smoke or use smokeless tobacco.   She denies pregnancy and denies breastfeeding. She is currently " menstruating.     MEDICATIONS: citalopram oral, Adderall oral, ALLERGIES: NKDA  Clinician Response:  Dear Tracey,  Based on the information you have provided, you likely have an acute urinary tract infection, also called a bladder infection. Bladder infections occur when bacteria from the outside of the body enters the urinary tract. Any part of the urinary system can be infected, but the bladder is the most common.  Medication information  I am prescribing:     Nitrofurantoin monohyd/m-cryst (Macrobid) 100 mg oral capsule. Take 1 capsule by mouth every 12 hours for 5 days. Take this medication with food. There are no refills with this prescription.   The medication I prescribed for your bladder infection is an antibiotic. Continue taking the medication until it is gone even if you feel better.   Yeast infections can be a common side effect of antibiotics. The most common symptom of a yeast infection is itchiness in and around the vagina. Other signs and symptoms include burning, redness, or a thick, white vaginal discharge that looks like cottage cheese and does not have a bad smell.  Unless you are allergic to the following over-the-counter medication, I recommend:     Phenazopyridine (AZO, Uristat, or store brand) oral tablet to treat your discomfort with urination. Swallow 2 tablets 3 times a day for up to 2 days. Take the tablets with a full glass of water after a meal.   This medication helps to relieve symptoms caused by irritation of urinary tract such as pain, burning, and the sudden urge to urinate, but will not cure a bladder infection. The color of your urine will likely turn an orange color and can permanently stain clothing it comes into contact with.  Soft contact lenses can also be permanently stained and should not be worn while taking this medication. If you must wear your contacts, wash your hands after handling the medication.  Stop using this medication immediately and be seen in a clinic or  urgent care if your skin or the whites of your eyes appear yellowish in color.  Over-the-counter medications do not require a prescription. Ask the pharmacist if you have any questions.  Self care  Urination helps to flush bacteria from the urinary tract. For this reason, drinking water and urinating often helps relieve some urinary symptoms and can decrease your risk of getting bladder infections in the future.  Other steps you can take to prevent future bladder infections include:     Wipe front to back after using the bathroom    Urinate after sexual intercourse    Avoid using deodorant sprays, douches, or powders in the vaginal area     When to seek care  Please make an appointment to be seen in a clinic or urgent care if any of the following occur:     You develop new symptoms or your symptoms become worse    You have medication side effects that make it difficult to take them as prescribed    Your symptoms do not improve within 1-2 days of starting treatment    You have symptoms of a bladder infection that return shortly after completing treatment     It is possible to have an allergic reaction to an antibiotic even if you have not had one in the past. If you notice a new rash, significant swelling, or difficulty breathing, stop taking this medication immediately and go to a clinic or urgent care.   Diagnosis: Acute uncomplicated bladder infection  Diagnosis ICD: N39.0  Prescription: nitrofurantoin monohyd/m-cryst (Macrobid) 100 mg oral capsule 10 capsule, 5 days supply. Take 1 capsule by mouth every 12 hours for 5 days. Refills: 0, Refill as needed: no, Allow substitutions: yes  Pharmacy: [facility]/pharmacy #[id number] - [phone] - [address] [place] [address]

## 2020-05-13 ENCOUNTER — COMMUNICATION - HEALTHEAST (OUTPATIENT)
Dept: FAMILY MEDICINE | Facility: CLINIC | Age: 30
End: 2020-05-13

## 2020-05-13 DIAGNOSIS — F98.8 ATTENTION DEFICIT DISORDER (ADD) WITHOUT HYPERACTIVITY: ICD-10-CM

## 2020-06-22 ENCOUNTER — COMMUNICATION - HEALTHEAST (OUTPATIENT)
Dept: SCHEDULING | Facility: CLINIC | Age: 30
End: 2020-06-22

## 2020-07-09 ENCOUNTER — COMMUNICATION - HEALTHEAST (OUTPATIENT)
Dept: FAMILY MEDICINE | Facility: CLINIC | Age: 30
End: 2020-07-09

## 2020-07-09 DIAGNOSIS — F98.8 ATTENTION DEFICIT DISORDER (ADD) WITHOUT HYPERACTIVITY: ICD-10-CM

## 2020-08-23 ENCOUNTER — COMMUNICATION - HEALTHEAST (OUTPATIENT)
Dept: FAMILY MEDICINE | Facility: CLINIC | Age: 30
End: 2020-08-23

## 2020-08-23 DIAGNOSIS — F98.8 ATTENTION DEFICIT DISORDER (ADD) WITHOUT HYPERACTIVITY: ICD-10-CM

## 2020-08-25 ENCOUNTER — COMMUNICATION - HEALTHEAST (OUTPATIENT)
Dept: FAMILY MEDICINE | Facility: CLINIC | Age: 30
End: 2020-08-25

## 2020-08-25 DIAGNOSIS — F98.8 ATTENTION DEFICIT DISORDER (ADD) WITHOUT HYPERACTIVITY: ICD-10-CM

## 2020-08-27 ENCOUNTER — OFFICE VISIT - HEALTHEAST (OUTPATIENT)
Dept: FAMILY MEDICINE | Facility: CLINIC | Age: 30
End: 2020-08-27

## 2020-08-27 DIAGNOSIS — R35.0 URINARY FREQUENCY: ICD-10-CM

## 2020-08-27 DIAGNOSIS — F33.9 EPISODE OF RECURRENT MAJOR DEPRESSIVE DISORDER, UNSPECIFIED DEPRESSION EPISODE SEVERITY (H): ICD-10-CM

## 2020-08-27 DIAGNOSIS — F98.8 ATTENTION DEFICIT DISORDER (ADD) WITHOUT HYPERACTIVITY: ICD-10-CM

## 2020-08-27 LAB
ALBUMIN UR-MCNC: NEGATIVE MG/DL
APPEARANCE UR: CLEAR
BACTERIA #/AREA URNS HPF: ABNORMAL HPF
BILIRUB UR QL STRIP: ABNORMAL
COLOR UR AUTO: YELLOW
GLUCOSE UR STRIP-MCNC: NEGATIVE MG/DL
HGB UR QL STRIP: ABNORMAL
KETONES UR STRIP-MCNC: NEGATIVE MG/DL
LEUKOCYTE ESTERASE UR QL STRIP: NEGATIVE
MUCOUS THREADS #/AREA URNS LPF: ABNORMAL LPF
NITRATE UR QL: NEGATIVE
PH UR STRIP: 6 [PH] (ref 5–8)
RBC #/AREA URNS AUTO: ABNORMAL HPF
SP GR UR STRIP: >=1.03 (ref 1–1.03)
SQUAMOUS #/AREA URNS AUTO: ABNORMAL LPF
UROBILINOGEN UR STRIP-ACNC: ABNORMAL
WBC #/AREA URNS AUTO: ABNORMAL HPF

## 2020-08-27 ASSESSMENT — PATIENT HEALTH QUESTIONNAIRE - PHQ9: SUM OF ALL RESPONSES TO PHQ QUESTIONS 1-9: 1

## 2020-08-27 ASSESSMENT — ANXIETY QUESTIONNAIRES
3. WORRYING TOO MUCH ABOUT DIFFERENT THINGS: NOT AT ALL
1. FEELING NERVOUS, ANXIOUS, OR ON EDGE: SEVERAL DAYS
4. TROUBLE RELAXING: SEVERAL DAYS
GAD7 TOTAL SCORE: 3
7. FEELING AFRAID AS IF SOMETHING AWFUL MIGHT HAPPEN: NOT AT ALL
2. NOT BEING ABLE TO STOP OR CONTROL WORRYING: NOT AT ALL
5. BEING SO RESTLESS THAT IT IS HARD TO SIT STILL: NOT AT ALL
IF YOU CHECKED OFF ANY PROBLEMS ON THIS QUESTIONNAIRE, HOW DIFFICULT HAVE THESE PROBLEMS MADE IT FOR YOU TO DO YOUR WORK, TAKE CARE OF THINGS AT HOME, OR GET ALONG WITH OTHER PEOPLE: SOMEWHAT DIFFICULT
6. BECOMING EASILY ANNOYED OR IRRITABLE: SEVERAL DAYS

## 2020-08-28 LAB — BACTERIA SPEC CULT: NO GROWTH

## 2020-08-31 ENCOUNTER — COMMUNICATION - HEALTHEAST (OUTPATIENT)
Dept: FAMILY MEDICINE | Facility: CLINIC | Age: 30
End: 2020-08-31

## 2020-10-01 ENCOUNTER — COMMUNICATION - HEALTHEAST (OUTPATIENT)
Dept: FAMILY MEDICINE | Facility: CLINIC | Age: 30
End: 2020-10-01

## 2020-10-01 DIAGNOSIS — F98.8 ATTENTION DEFICIT DISORDER (ADD) WITHOUT HYPERACTIVITY: ICD-10-CM

## 2020-11-12 ENCOUNTER — COMMUNICATION - HEALTHEAST (OUTPATIENT)
Dept: FAMILY MEDICINE | Facility: CLINIC | Age: 30
End: 2020-11-12

## 2020-11-12 DIAGNOSIS — F98.8 ATTENTION DEFICIT DISORDER (ADD) WITHOUT HYPERACTIVITY: ICD-10-CM

## 2020-11-17 ENCOUNTER — VIRTUAL VISIT (OUTPATIENT)
Dept: FAMILY MEDICINE | Facility: OTHER | Age: 30
End: 2020-11-17

## 2020-11-17 ENCOUNTER — COMMUNICATION - HEALTHEAST (OUTPATIENT)
Dept: FAMILY MEDICINE | Facility: CLINIC | Age: 30
End: 2020-11-17

## 2020-11-17 DIAGNOSIS — F32.A DEPRESSION: ICD-10-CM

## 2020-11-17 NOTE — PROGRESS NOTES
"Date: 2020 08:21:59  Clinician: Rodolfo Brown  Clinician NPI: [id number]  Patient: Tracey Crawford  Patient : 1990  Patient Address: [address] [place] [address]  Patient Phone: [phone]  Visit Protocol: URI  Patient Summary:  Tracey is a 30 year old ( : 1990 ) female who initiated a [facility] Visit for COVID-19 (Coronavirus) evaluation and screening. When asked the question \"Please sign me up to receive news, health information and promotions. \", Tracey responded \"No\".    When asked when her symptoms started, Tracey reported that she does not have any symptoms.   She denies taking antibiotic medication in the past month and having recent facial or sinus surgery in the past 60 days.    Pertinent COVID-19 (Coronavirus) information  Tracey does not work or volunteer as healthcare worker or a . In the past 14 days, Tracey has not worked or volunteered at a healthcare facility or group living setting.   In the past 14 days, she also has not lived in a congregate living setting.   Tracey has not had a close contact with a laboratory-confirmed COVID-19 patient within the last 14 days.    Since 2019, Tracey has not been tested for COVID-19 and has not had upper respiratory infection or influenza-like illness.   Pertinent medical history  Tracey does not get yeast infections when she takes antibiotics.   Tracey needs a return to work/school note.   Weight: 165 lbs   Tracey does not smoke or use smokeless tobacco.   She denies pregnancy and denies breastfeeding. She is currently menstruating.   Weight: 165 lbs    MEDICATIONS: bupropion HCl oral, citalopram oral, amphetamine oral, ALLERGIES: NKDA  Clinician Response:  Dear Tracey,   Based on your exposure to COVID-19 (coronavirus), we would like to test you for this virus.  1. Please call [phone] to schedule your visit. Explain that you were referred by [facility] to have a COVID-19 test. Be ready to share your [facility] visit ID number. "  * If you need to schedule in [facility] please call [phone] or for [employer] employees please call [phone].   * If you need to schedule in the [place] area please call [phone]. [place] employees call [phone].   The following will serve as your written order for this COVID Test, ordered by me, for the indication of suspected COVID [Z20.828]: The test will be ordered in [employer], our electronic health record, after you are scheduled. It will show as ordered and authorized by Luis Manuel Quijano MD.  Order: COVID-19 (coronavirus) PCR for ASYMPTOMATIC EXPOSURE testing from [facility].   If you know you have had close contact with someone who tested positive, you should be quarantined for 14 days after this exposure. You should stay in quarantine for the14 days even if the covid test is negative, the optimal time to test after exposure is 5-7 days from the exposure  Quarantine means   What should I do?  For safety, it's very important to follow these rules. Do this for 14 days after the date you were last exposed to the virus..  Stay home and away from others. Don't go to school or anywhere else. Generally quarantine means staying home from work but there are some exceptions to this. Please contact your workplace.   No hugging, kissing or shaking hands.  Don't let anyone visit.  Cover your mouth and nose with a mask, tissue or washcloth to avoid spreading germs.  Wash your hands and face often. Use soap and water.  What are the symptoms of COVID-19?  The most common symptoms are cough, fever and trouble breathing. Less common symptoms include headache, body aches, fatigue (feeling very tired), chills, sore throat, stuffy or runny nose, diarrhea (loose poop), loss of taste or smell, belly pain, and nausea or vomiting (feeling sick to your stomach or throwing up).  After 14 days, if you have still don't have symptoms, you likely don't have this virus.  If you develop symptoms, follow these guidelines.  If you're  normally healthy: Please start another [facility] visit to report your symptoms. Go to [email or web address].  If you have a serious health problem (like cancer, heart failure, an organ transplant or kidney disease): Call your specialty clinic. Let them know that you might have COVID-19.  2. When it's time for your COVID test:  Stay at least 6 feet away from others. (If someone will drive you to your test, stay in the backseat, as far away from the  as you can.)  Cover your mouth and nose with a mask, tissue or washcloth.  Go straight to the testing site. Don't make any stops on the way there or back.  Please note  Caregivers in these groups are at risk for severe illness due to COVID-19:  o People 65 years and older  o People who live in a nursing home or long-term care facility  o People with chronic disease (lung, heart, cancer, diabetes, kidney, liver, immunologic)  o People who have a weakened immune system, including those who:  Are in cancer treatment  Take medicine that weakens the immune system, such as corticosteroids  Had a bone marrow or organ transplant  Have an immune deficiency  Have poorly controlled HIV or AIDS  Are obese (body mass index of 40 or higher)  Smoke regularly  Where can I get more information?  [facility] -- About COVID-19: [email or web address]  CDC -- What to Do If You're Sick: www.cdc.gov/coronavirus/2019-ncov/about/steps-when-sick.html  CDC -- Ending Home Isolation: www.cdc.gov/coronavirus/2019-ncov/hcp/disposition-in-home-patients.html  CDC -- Caring for Someone: www.cdc.gov/coronavirus/2019-ncov/if-you-are-sick/care-for-someone.html  [facility] -- Interim Guidance for Hospital Discharge to Home: www.health.[facility].[place].us/diseases/coronavirus/hcp/hospdischarge.pdf  [facility] clinical trials (COVID-19 research studies): clinicalaffairs.[facility]/umn-clinical-trials  Below are the COVID-19 hotlines at the [place] Department of Health ([facility]). Interpreters are available.   For health questions: Call 203-332-1923 or 1-551.231.6597 (7 a.m. to 7 p.m.)  For questions about schools and childcare: Call 819-801-8438 or 1-753.119.8928 (7 a.m. to 7 p.m.)    Diagnosis: Contact with and (suspected) exposure to other viral communicable diseases  Diagnosis ICD: Z20.828

## 2020-12-22 ENCOUNTER — COMMUNICATION - HEALTHEAST (OUTPATIENT)
Dept: FAMILY MEDICINE | Facility: CLINIC | Age: 30
End: 2020-12-22

## 2020-12-22 DIAGNOSIS — F98.8 ATTENTION DEFICIT DISORDER (ADD) WITHOUT HYPERACTIVITY: ICD-10-CM

## 2021-02-03 ENCOUNTER — COMMUNICATION - HEALTHEAST (OUTPATIENT)
Dept: FAMILY MEDICINE | Facility: CLINIC | Age: 31
End: 2021-02-03

## 2021-02-03 DIAGNOSIS — F98.8 ATTENTION DEFICIT DISORDER (ADD) WITHOUT HYPERACTIVITY: ICD-10-CM

## 2021-03-15 ENCOUNTER — OFFICE VISIT (OUTPATIENT)
Dept: URGENT CARE | Facility: URGENT CARE | Age: 31
End: 2021-03-15
Payer: COMMERCIAL

## 2021-03-15 VITALS
OXYGEN SATURATION: 98 % | HEART RATE: 77 BPM | SYSTOLIC BLOOD PRESSURE: 135 MMHG | DIASTOLIC BLOOD PRESSURE: 68 MMHG | TEMPERATURE: 98 F

## 2021-03-15 DIAGNOSIS — R10.9 ABDOMINAL PAIN, UNSPECIFIED ABDOMINAL LOCATION: Primary | ICD-10-CM

## 2021-03-15 PROCEDURE — 99213 OFFICE O/P EST LOW 20 MIN: CPT | Performed by: FAMILY MEDICINE

## 2021-03-15 RX ORDER — DEXTROAMPHETAMINE SACCHARATE, AMPHETAMINE ASPARTATE, DEXTROAMPHETAMINE SULFATE AND AMPHETAMINE SULFATE 2.5; 2.5; 2.5; 2.5 MG/1; MG/1; MG/1; MG/1
1 TABLET ORAL
COMMUNITY
Start: 2021-02-04 | End: 2021-08-10

## 2021-03-15 NOTE — PATIENT INSTRUCTIONS
[facility]  to schedule pelvic ultrasound. Results will be sent to urgent care at [facility]. If normal, next step is to follow up with Dr. Esteban.

## 2021-03-15 NOTE — PROGRESS NOTES
Assessment & Plan     Abdominal pain, intermittent  Unclear cause of pain. Pelvic ultrasound ordered for legacy HE site, since her PCP is at [facility]. If normal pelvic ultrasound, follow up with PCP.   - US Pelvic Complete with Transvaginal; Future                 No follow-ups on file.    Dunia Bone MD  [facility] [place]    Shasha Webb is a 30 year old who presents for the following health issues     HPI   Abd pain, Intermittent, comes and goes for about the past year. Can go weeks without it occurring. Then, it acts up and bothers her a while. Most often, it is mild pain, not extreme. Occasionally with a sneeze or cough, severe pain in right periumbilical area momentarily, goes away on its own. Bothering her today, so wanted to come in while she was actually having it.     No change in BMs. No blood. No n/v. Appetite fine. No weight loss. Menses regular, lmp about 2 weeks ago, has not had intercourse since, and denies pregnancy.     Slight urinary urgency today.     Prev c/s, no other surgeries.             Review of Systems         Objective    /68   Pulse 77   Temp 98  F (36.7  C) (Tympanic)   LMP 03/01/2021 (Approximate)   SpO2 98%   There is no height or weight on file to calculate BMI.  Physical Exam   GENERAL: healthy, alert and no distress  NECK: no adenopathy, no asymmetry  RESP: lungs clear to auscultation - no rales, rhonchi or wheezes  CV: regular rate and rhythm, normal S1 S2, no S3 or S4, no murmur, click or rub, no peripheral edema and peripheral pulses strong  ABDOMEN: soft, mild tenderness right periumbilical area. No hernia or defect appreciated here. No masses appreciated.   MS: no gross musculoskeletal defects noted, no edema

## 2021-03-16 ENCOUNTER — RECORDS - HEALTHEAST (OUTPATIENT)
Dept: ADMINISTRATIVE | Facility: OTHER | Age: 31
End: 2021-03-16

## 2021-03-17 ENCOUNTER — COMMUNICATION - HEALTHEAST (OUTPATIENT)
Dept: FAMILY MEDICINE | Facility: CLINIC | Age: 31
End: 2021-03-17

## 2021-03-17 ENCOUNTER — HOSPITAL ENCOUNTER (OUTPATIENT)
Dept: ULTRASOUND IMAGING | Facility: HOSPITAL | Age: 31
Discharge: HOME | End: 2021-03-17
Attending: FAMILY MEDICINE

## 2021-03-17 DIAGNOSIS — R10.9 ABDOMINAL PAIN, UNSPECIFIED ABDOMINAL LOCATION: ICD-10-CM

## 2021-03-18 ENCOUNTER — AMBULATORY - HEALTHEAST (OUTPATIENT)
Dept: FAMILY MEDICINE | Facility: CLINIC | Age: 31
End: 2021-03-18

## 2021-03-18 DIAGNOSIS — R10.9 ABDOMINAL PAIN, UNSPECIFIED ABDOMINAL LOCATION: ICD-10-CM

## 2021-03-26 ENCOUNTER — HOSPITAL ENCOUNTER (OUTPATIENT)
Dept: CT IMAGING | Facility: CLINIC | Age: 31
Discharge: HOME | End: 2021-03-26
Attending: FAMILY MEDICINE

## 2021-03-26 DIAGNOSIS — R10.9 ABDOMINAL PAIN, UNSPECIFIED ABDOMINAL LOCATION: ICD-10-CM

## 2021-03-29 ENCOUNTER — COMMUNICATION - HEALTHEAST (OUTPATIENT)
Dept: FAMILY MEDICINE | Facility: CLINIC | Age: 31
End: 2021-03-29

## 2021-03-29 DIAGNOSIS — F98.8 ATTENTION DEFICIT DISORDER (ADD) WITHOUT HYPERACTIVITY: ICD-10-CM

## 2021-05-04 ENCOUNTER — COMMUNICATION - HEALTHEAST (OUTPATIENT)
Dept: FAMILY MEDICINE | Facility: CLINIC | Age: 31
End: 2021-05-04

## 2021-05-04 DIAGNOSIS — F98.8 ATTENTION DEFICIT DISORDER (ADD) WITHOUT HYPERACTIVITY: ICD-10-CM

## 2021-05-13 ENCOUNTER — OFFICE VISIT - HEALTHEAST (OUTPATIENT)
Dept: FAMILY MEDICINE | Facility: CLINIC | Age: 31
End: 2021-05-13

## 2021-05-13 DIAGNOSIS — F41.9 ANXIETY: ICD-10-CM

## 2021-05-13 DIAGNOSIS — G56.03 BILATERAL CARPAL TUNNEL SYNDROME: ICD-10-CM

## 2021-05-13 DIAGNOSIS — Z91.148 CONTROLLED SUBSTANCE AGREEMENT BROKEN: ICD-10-CM

## 2021-05-13 DIAGNOSIS — F33.9 EPISODE OF RECURRENT MAJOR DEPRESSIVE DISORDER, UNSPECIFIED DEPRESSION EPISODE SEVERITY (H): ICD-10-CM

## 2021-05-13 DIAGNOSIS — F98.8 ATTENTION DEFICIT DISORDER (ADD) WITHOUT HYPERACTIVITY: ICD-10-CM

## 2021-05-13 DIAGNOSIS — K80.20 GALLSTONES: ICD-10-CM

## 2021-05-13 DIAGNOSIS — R68.84 JAW PAIN: ICD-10-CM

## 2021-05-13 DIAGNOSIS — R06.02 SOB (SHORTNESS OF BREATH): ICD-10-CM

## 2021-05-13 LAB
AMPHETAMINES UR QL SCN: ABNORMAL
BARBITURATES UR QL: ABNORMAL
BENZODIAZ UR QL: ABNORMAL
CANNABINOIDS UR QL SCN: ABNORMAL
COCAINE UR QL: ABNORMAL
CREAT UR-MCNC: 293.8 MG/DL
METHADONE UR QL SCN: ABNORMAL
OPIATES UR QL SCN: ABNORMAL
OXYCODONE UR QL: ABNORMAL
PCP UR QL SCN: ABNORMAL

## 2021-05-13 ASSESSMENT — PATIENT HEALTH QUESTIONNAIRE - PHQ9: SUM OF ALL RESPONSES TO PHQ QUESTIONS 1-9: 4

## 2021-05-13 ASSESSMENT — ANXIETY QUESTIONNAIRES
3. WORRYING TOO MUCH ABOUT DIFFERENT THINGS: SEVERAL DAYS
IF YOU CHECKED OFF ANY PROBLEMS ON THIS QUESTIONNAIRE, HOW DIFFICULT HAVE THESE PROBLEMS MADE IT FOR YOU TO DO YOUR WORK, TAKE CARE OF THINGS AT HOME, OR GET ALONG WITH OTHER PEOPLE: VERY DIFFICULT
1. FEELING NERVOUS, ANXIOUS, OR ON EDGE: NOT AT ALL
2. NOT BEING ABLE TO STOP OR CONTROL WORRYING: NOT AT ALL
7. FEELING AFRAID AS IF SOMETHING AWFUL MIGHT HAPPEN: NOT AT ALL
4. TROUBLE RELAXING: NOT AT ALL
6. BECOMING EASILY ANNOYED OR IRRITABLE: NEARLY EVERY DAY
5. BEING SO RESTLESS THAT IT IS HARD TO SIT STILL: NOT AT ALL
GAD7 TOTAL SCORE: 4

## 2021-05-27 VITALS
TEMPERATURE: 98 F | SYSTOLIC BLOOD PRESSURE: 110 MMHG | RESPIRATION RATE: 16 BRPM | DIASTOLIC BLOOD PRESSURE: 68 MMHG | WEIGHT: 158.5 LBS | HEART RATE: 82 BPM

## 2021-05-27 ASSESSMENT — PATIENT HEALTH QUESTIONNAIRE - PHQ9
SUM OF ALL RESPONSES TO PHQ QUESTIONS 1-9: 1
SUM OF ALL RESPONSES TO PHQ QUESTIONS 1-9: 4

## 2021-05-27 NOTE — PATIENT INSTRUCTIONS - HE
Patient Education   HEALTHY PREGNANCY CARE: 34-36 WEEKS PREGNANT    Your baby is gaining about an ounce each day, so healthy nutrition and rest are still very important. Learn about late pregnancy symptoms, such as constipation and backaches. Drinking more fluids and eating more fiber can relieve constipation. The pelvic tilt and a heating pad can ease backaches.    Continue to watch for signs and symptoms of preeclampsia:     Sudden swelling of your face, hands, or feet     New vision problems such as blurring, double vision, or flashing lights    A severe headache not relieved with acetaminophen (Tylenol)    Sharp or stabbing pain in your right or middle upper abdomen    You're almost done with your pregnancy but it's still too soon to have your baby. The goal is to have your baby after 37 weeks, so watch for signs and symptoms of premature labor:     Regular contractions. This means having about 6 or more within 1 hour, even after you have had a glass of water and are resting.     A backache that starts and stops regularly.    An increase or change in vaginal discharge, such as heavy, mucus-like, watery, or bloody discharge.     Your water breaks or leaks.    You will be tested for group B streptococcus (GBS), a type of bacteria found in 10-30% of pregnant women. A woman with GBS can pass it to her baby during delivery. Most babies who get GBS from their mothers do not have any problems, but some babies get very ill and need to be hospitalized for antibiotic treatment. Treating you with antibiotics during labor and delivery helps to prevent infection in your baby.    Review information on postpartum care that you will need after the baby is born.  Discuss your choices and plans for birth control with your midwife or physician.     Postpartum Care  During the days and weeks after the delivery of your baby (postpartum period), your body will change as it returns to its nonpregnant condition. As with pregnancy  "changes, postpartum changes are different for every woman.    Physical changes after childbirth  The changes in your body may include:    Contractions called afterpains shrink the uterus for several days after childbirth. Shrinking of the uterus to its prepregnancy size may take 6 to 8 weeks.    Sore muscles (especially in the arms, neck, or jaw) are common after childbirth. This is because of the hard work of labor and pushing your baby out. The soreness should go away in a few days.    Bleeding and vaginal discharge (lochia) may last for 2 to 8 weeks, and can come and go for about 2 months.    Vaginal soreness, including pain, discomfort, and numbness, is common after vaginal birth. Soreness may be worse if you had a perineal tear or episiotomy.    If you had a  birth (), you may have pain in your lower abdomen and may need pain medicine for 1 to 2 weeks.    Breast engorgement is common around the 3rd or 4th day after delivery, when the breasts begin to fill with milk. This can cause discomfort and swelling. If you are breast feeding, the best treatment is to feed your baby often or pump the milk out. You can also use warm compresses. If you are not breast feeding, placing ice packs on your breasts, taking a hot shower, or using warm compresses may relieve the discomfort.    Be aware of postpartum depression    \"Baby blues\" are common for the first 1 to 2 weeks after birth. You may cry or feel sad or irritable for no reason.     For some women, these feelings last longer and are more intense. This is called postpartum depression.     If your symptoms last for more than a few weeks or you feel very depressed, ask your midwife or physician for help.     Postpartum depression can be treated. Support groups and counseling can help, but sometimes medication is needed.     Discuss follow-up appointments with your midwife or physician, as well. He or she will usually want to see you 6 weeks after the " birth of your baby, sooner if you are having problems.    If you have questions about any symptoms you are experiencing or any other concerns, call your provider or their clinic staff at [facility] at Phone: [phone]. If it's after clinic hours, physician patients should call the [facility] at [phone]); midwife patients should call their answering service at [phone].    How can you care for yourself at home?   You can refer to the Starting Out Right book or find it online at [email or web address] or [email or web address]     You can sign up for a weekly parenting e-mail that gives support, tips and advice from health care professionals that starts with pregnancy and continues through the toddler years. To register, go to [email or web address] at any time during your pregnancy.        Making Plans for Feeding My Baby    By this point, you probably have read a lot about feeding your baby.  Breastfeed or formula? Each mother s decision is her own and [facility] respects you and your choices. We ve gathered information on both breastfeeding and formula feeding to help with your decision. Talking with your physician or nurse-midwife can also help in your decision.  However you plan to feed your baby, [facility] encourage rooming in with your baby, skin-to-skin contact and feeding your baby based on his or her cues.    Skin-to-skin contact  Being close to mom helps your baby adjust to life outside of the womb.  It helps your baby regulate their body temperature, heart rate, and breathing.  Your baby will usually be placed skin-to-skin immediately following birth or as soon as possible, if medical intervention is needed.    Rooming-In  Having your baby  stay with you in your room is called  rooming-in .  Keeping your baby in your room helps you to learn how to care for your baby by getting to know your baby s cues, body rhythms and sleep cycle.       Cue-based feeding  Cues (signals) are baby s way of telling you what he or she wants.  When you learn your infant s cues, you know how to care for and feed your baby.   Feeding cues are the licking and smacking of lips, bringing their fist to their mouth, and a reflex called  rooting - where baby turns and opens his or her mouth, searching for the breast or bottle.  Crying is a late feeding cue.  Babies can feed frequently, often at least 8 times in 24 hours.  Breastfeeding facts  Breast milk is the best source of nutrition for your baby and is available at birth. In the first couple of days, your milk volume is already starting to increase, though it may not be noticeable. Breastfeed frequently to increase your milk supply. Within three to five days, you will begin to notice larger milk volumes. An increase in breast size, heaviness and firmness are often described as the milk  coming in.  Frequent breastfeeding can help breasts from getting overly firm and painful. You will know the baby is getting enough milk if your baby is having wet and dirty diapers and gaining weight.     If your goal is to exclusively breastfeed, it is important to not use any formula or artificial nipples (including bottles and pacifiers) while your baby is learning to breastfeed.  While it may seem like an  easy  option to give your baby a bottle, formula should only be given if there is a medical reason for your baby to have it.    Positioning and attachment   Get comfortable.  Use pillows as needed to support your arms and baby.  Hold baby close at the level of your breast, facing you in a tummy to tummy position.  Skin to skin helps with this.  Position the baby with his or her nose by the nipple.  There should be a straight line from  baby s ear to shoulder to hips.  Tickle your baby s lips or wait for baby to open mouth wide, bring baby to breast by leading with the chin.  Aim the nipple at the roof of baby s mouth.  A rapid sucking pattern is followed by longer, drawing pattern with occasional swallows heard.  When baby is correctly latched, your nipple and much of the areola are pulled well into baby s mouth.      Returning to work or school  Focus on a good start to breastfeeding.  Many women continue to provide breastmilk for their baby when they return to work or school.  Making plans about where to pump and store milk can make the transition go well.  Talk with other mothers who have also returned to work or school for tips and support.  Your employer s Human Resource department may be a resource as well.     Returning to work or school: (continued)     babies can mean fewer  sick  days for you.    A quality breast pump will also save time and add comfort.  Check with your insurance prior to giving birth for breast pump coverage.  Many insurance companies include a pump within your benefits.    Wait until your baby is at least three weeks old to introduce a bottle for the first time.  Have someone besides you give the bottle.    Breastfeed when you are with your baby. Reserve your bottles of breast milk for when you are away.     Your breasts will need to be  emptied  either by your baby or a pump.  Plan to pump at least twice in an eight hour day.    If you cannot pump at work, continue breastfeeding at home. Any amount of breast milk is worth giving to your baby.    Formula feeding facts  If you are planning to use formula to feed your baby, you will want to make some preparations ahead of time. Talk to your doctor or nurse-midwife about what type of formula to use. Some are iron-fortified, meaning they have extra iron in them. You will want to purchase formula and bottles before your baby is born to be sure you are ready after  you return from the hospital. The [facility] do not provide formula samples to take home.    Be sure to follow formula mixing directions closely. Regular milk in the dairy case at the grocery store should not be given to babies under 1 year old. Baby formula is sold in several forms including:    Ready-to-use. This is the most expensive, but no mixing is necessary.    Concentrated liquid. This is less expensive than ready-to-use and you mix with water.    Powder. This is the least expensive. You mix one level scoop of powdered formula with two ounces of water and stir well.    Most babies need 2.5 ounces of formula per pound of body weight each day. This means an 8-pound baby may drink about 20 ounces of formula a day; however, this is just an estimate. The most important thing is to pay attention to your baby s cues.  If your baby is always fussy, needs more iron or has certain food allergies, your physician may suggest you change your baby s formula to a different kind.   How do I warm my baby s bottles?  You may feed your baby a bottle without warming it first. It is OK for the breast milk or formula to be cool or room temperature. If your baby seems to prefer it warmed, you can put the filled bottle in a container of warm water and let it stand for a few minutes. Check the temperature of the liquid on your skin before feeding it to your baby; to be sure it isn t too hot. Do not heat bottles in the microwave. Microwaves heat food and liquids unevenly, and this can cause hot spots that can burn your baby.    How do I clean and sterilize bottles?  Sterilize bottles and nipples before you use them for the first time. You can do this by putting them in boiling water for 5 minutes. After that first time, you can wash them in hot and soapy water. Rinse them carefully to be sure there is no soap left on them. You can also wash them in the .    [facility] [phone]  ([phone])      Seeing OBGYN to discuss vaginal delivery after  this week.    Follow-up in 1 week.    Today: GBS, Hgb

## 2021-05-27 NOTE — PROGRESS NOTES
S: Overall doing well.  Baby is very active.  She is feeling a pubic pressure.  She still does have some round ligament pain which is unchanged.  No vaginal bleeding.  No concerns today.  She said she had to cancel her appointment last week because she did not have childcare for her  kids.  Her father passed away last year.  His birthday was  so she is hoping for a birth on that day if it happens.    O: Deep tendon reflexes of lower extremities 2+ and symmetric, no lower extremity edema, urinalysis with small leukocytes otherwise negative    A/P: 28 year old  3 para 11510 doing well.  Previous  for fetal intolerance of labor and double nuchal cord.  Seeing OBGYN to discuss vaginal delivery after  this week.  Follow-up in 1 week.  Today: GBS, Hgb

## 2021-05-27 NOTE — PROGRESS NOTES
S: The patient states that 2 nights ago she had 2 contractions in a 30 minute time span, but had no more after that. She notes that she has had some cramping as well, but this has not felt like contractions. She also reports that she has felt increased pelvic pressure and some swelling in her ankles.    O: Trace edema in both ankles, deep tendon reflexes 2+. Hemoglobin: 12.9. Group B strep negative. UA-small LE.    A/P:   38-4/7 weeks gestation.  If you feel your water is breaking with a gush or a slow constant trickle, or you feel contractions, call the hospital.  Follow-up in 1 week.    ADDITIONAL HISTORY SUMMARIZED (2): None.  DECISION TO OBTAIN EXTRA INFORMATION (1): None.   RADIOLOGY TESTS (1): None.  LABS (1): Labs ordered today.  MEDICINE TESTS (1): None.  INDEPENDENT REVIEW (2 each): None.     The visit lasted a total of 20 minutes face to face with the patient. Over 50% of the time was spent counseling and educating the patient about labor signs and prenatal wellness/development.    IUzma, am scribing for and in the presence of, Dr. Esteban at  4/17/19 . 1:47pm    I, Dr. Esteban, personally performed the services described in this documentation, as scribed by Uzma Fowler in my presence, and it is both accurate and complete.    Total data points: 1

## 2021-05-27 NOTE — PATIENT INSTRUCTIONS - HE
If you feel your water is breaking with a gush or a slow constant trickle, or you feel contractions, call the hospital.    Patient Education   HEALTHY PREGNANCY CARE: 37 to 41 WEEKS PREGNANT    Talk with your midwife or physician about when to call with signs of labor    Regular uterine contractions that are getting closer together and/or stronger    If you think your water has broken or is leaking    Bleeding from the vagina like a period (bloody vaginal discharge is normal)    If you are not feeling your baby move    Make plans for transportation and  as needed for when you are going to the hospital.    Your midwife or physician may offer to check your cervix for changes.     Ask your health care provider about vaccinations you may need following delivery. By now, you should have received a Tdap immunization to protect against pertussis or whooping cough. Fathers and family members who will be in close contact with the baby should also receive a Tdap shot at least two weeks before the expected birth of the baby if they have not had a Td (tetanus) shot for at least two years.    If you are past your due date, discuss the next steps leading to delivery with your midwife or physician. If you don't start labor on your own by 41 or 42 weeks, your midwife or physician may recommend giving you medicines to ripen your cervix and start labor.    Preparing for your baby: Tell your midwife or physician how you plan to feed your baby (breast or bottle), who you have chosen to do pediatric care for your baby, and if you have a boy, whether you have chosen to have him circumcised. You will need a car seat correctly installed in your vehicle to bring your baby home. As you start to set up the nursery at home for your baby, make sure the crib is safe. The mattress needs to fit snugly against the edges of the crib. If you can fit a soda can between the bars, they are too far apart and can allow the baby's head to caught  between them.    Learn about infant care and feeding, including information about infant CPR. We recommend that you put your baby to sleep on his or her back to reduce the chance of Sudden Infant Death Syndrome (SIDS). To maintain a healthy environment in which your child can grow, it's best to keep your home smoke-free. By preparing ahead, your transition into parenthood will go smoothly for you and your baby.    Your midwife or physician will want to see you for a checkup 2 to 6 weeks after delivery.    If you have questions about any symptoms you are experiencing or any other concerns, call your provider or their clinic staff at [facility]/OB at Phone: [phone]. If it's after clinic hours, physician patients should call the Care Connection at [phone]); midwife patients should call their answering service at [phone].    How can you care for yourself at home?   You can refer to the Starting Out Right book or find it online at [email or web address]-Right.pdf or [email or web address]-out-[email or web address]-out-[email or web address]#[email or web address]     You can sign up for a weekly parenting e-mail that gives support, tips and advice from health care professionals that starts with pregnancy and continues through the toddler years. To register, go to [email or web address] at any time during your pregnancy.        Making Plans for Feeding My Baby    By this point, you probably have read a lot about feeding your baby.  Breastfeed or formula? Each mother s decision is her own and [facility] respects you and your choices. We ve gathered information on both breastfeeding and formula feeding to help with your decision. Talking with your physician or nurse-midwife can also help in your decision.  However you plan to feed your baby, [facility] encourage rooming in with your  baby, skin-to-skin contact and feeding your baby based on his or her cues.    Skin-to-skin contact  Being close to mom helps your baby adjust to life outside of the womb.  It helps your baby regulate their body temperature, heart rate, and breathing.  Your baby will usually be placed skin-to-skin immediately following birth or as soon as possible, if medical intervention is needed.    Rooming-In  Having your baby stay with you in your room is called  rooming-in .  Keeping your baby in your room helps you to learn how to care for your baby by getting to know your baby s cues, body rhythms and sleep cycle.       Cue-based feeding  Cues (signals) are baby s way of telling you what he or she wants.  When you learn your infant s cues, you know how to care for and feed your baby.   Feeding cues are the licking and smacking of lips, bringing their fist to their mouth, and a reflex called  rooting - where baby turns and opens his or her mouth, searching for the breast or bottle.  Crying is a late feeding cue.  Babies can feed frequently, often at least 8 times in 24 hours.  Breastfeeding facts  Breast milk is the best source of nutrition for your baby and is available at birth. In the first couple of days, your milk volume is already starting to increase, though it may not be noticeable. Breastfeed frequently to increase your milk supply. Within three to five days, you will begin to notice larger milk volumes. An increase in breast size, heaviness and firmness are often described as the milk  coming in.  Frequent breastfeeding can help breasts from getting overly firm and painful. You will know the baby is getting enough milk if your baby is having wet and dirty diapers and gaining weight.     If your goal is to exclusively breastfeed, it is important to not use any formula or artificial nipples (including bottles and pacifiers) while your baby is learning to breastfeed.  While it may seem like an  easy  option to give your  baby a bottle, formula should only be given if there is a medical reason for your baby to have it.    Positioning and attachment   Get comfortable.  Use pillows as needed to support your arms and baby.  Hold baby close at the level of your breast, facing you in a tummy to tummy position.  Skin to skin helps with this.  Position the baby with his or her nose by the nipple.  There should be a straight line from baby s ear to shoulder to hips.  Tickle your baby s lips or wait for baby to open mouth wide, bring baby to breast by leading with the chin.  Aim the nipple at the roof of baby s mouth.  A rapid sucking pattern is followed by longer, drawing pattern with occasional swallows heard.  When baby is correctly latched, your nipple and much of the areola are pulled well into baby s mouth.      Returning to work or school  Focus on a good start to breastfeeding.  Many women continue to provide breastmilk for their baby when they return to work or school.  Making plans about where to pump and store milk can make the transition go well.  Talk with other mothers who have also returned to work or school for tips and support.  Your employer s Human Resource department may be a resource as well.     Returning to work or school: (continued)     babies can mean fewer  sick  days for you.    A quality breast pump will also save time and add comfort.  Check with your insurance prior to giving birth for breast pump coverage.  Many insurance companies include a pump within your benefits.    Wait until your baby is at least three weeks old to introduce a bottle for the first time.  Have someone besides you give the bottle.    Breastfeed when you are with your baby. Reserve your bottles of breast milk for when you are away.     Your breasts will need to be  emptied  either by your baby or a pump.  Plan to pump at least twice in an eight hour day.    If you cannot pump at work, continue breastfeeding at home. Any amount of  breast milk is worth giving to your baby.    Formula feeding facts  If you are planning to use formula to feed your baby, you will want to make some preparations ahead of time. Talk to your doctor or nurse-midwife about what type of formula to use. Some are iron-fortified, meaning they have extra iron in them. You will want to purchase formula and bottles before your baby is born to be sure you are ready after you return from the hospital. The [facility] do not provide formula samples to take home.    Be sure to follow formula mixing directions closely. Regular milk in the dairy case at the grocery store should not be given to babies under 1 year old. Baby formula is sold in several forms including:    Ready-to-use. This is the most expensive, but no mixing is necessary.    Concentrated liquid. This is less expensive than ready-to-use and you mix with water.    Powder. This is the least expensive. You mix one level scoop of powdered formula with two ounces of water and stir well.    Most babies need 2.5 ounces of formula per pound of body weight each day. This means an 8-pound baby may drink about 20 ounces of formula a day; however, this is just an estimate. The most important thing is to pay attention to your baby s cues.  If your baby is always fussy, needs more iron or has certain food allergies, your physician may suggest you change your baby s formula to a different kind.   How do I warm my baby s bottles?  You may feed your baby a bottle without warming it first. It is OK for the breast milk or formula to be cool or room temperature. If your baby seems to prefer it warmed, you can put the filled bottle in a container of warm water and let it stand for a few minutes. Check the temperature of the liquid on your skin before feeding it to your baby; to be sure it isn t too hot. Do not heat bottles in the microwave. Microwaves heat food and liquids unevenly, and this can cause hot spots that can burn  your baby.    How do I clean and sterilize bottles?  Sterilize bottles and nipples before you use them for the first time. You can do this by putting them in boiling water for 5 minutes. After that first time, you can wash them in hot and soapy water. Rinse them carefully to be sure there is no soap left on them. You can also wash them in the .    [facility] Connection [phone] ([phone])

## 2021-05-27 NOTE — PATIENT INSTRUCTIONS - HE
[place] [facility] [phone].    [place] online for [place].    Tums, max of 4 a day if needed for acid reflux.    Now or in labor, can use Pepcid 20 mg up to 2 times a day if needed for reflux.    Follow-up in 1 week.    Patient Education   HEALTHY PREGNANCY CARE: 37 to 41 WEEKS PREGNANT    Talk with your midwife or physician about when to call with signs of labor    Regular uterine contractions that are getting closer together and/or stronger    If you think your water has broken or is leaking    Bleeding from the vagina like a period (bloody vaginal discharge is normal)    If you are not feeling your baby move    Make plans for transportation and  as needed for when you are going to the hospital.    Your midwife or physician may offer to check your cervix for changes.     Ask your health care provider about vaccinations you may need following delivery. By now, you should have received a Tdap immunization to protect against pertussis or whooping cough. Fathers and family members who will be in close contact with the baby should also receive a Tdap shot at least two weeks before the expected birth of the baby if they have not had a Td (tetanus) shot for at least two years.    If you are past your due date, discuss the next steps leading to delivery with your midwife or physician. If you don't start labor on your own by 41 or 42 weeks, your midwife or physician may recommend giving you medicines to ripen your cervix and start labor.    Preparing for your baby: Tell your midwife or physician how you plan to feed your baby (breast or bottle), who you have chosen to do pediatric care for your baby, and if you have a boy, whether you have chosen to have him circumcised. You will need a car seat correctly installed in your vehicle to bring your baby home. As you start to set up the nursery at home for your baby, make sure the crib is safe. The mattress needs to fit snugly against the edges of  the crib. If you can fit a soda can between the bars, they are too far apart and can allow the baby's head to caught between them.    Learn about infant care and feeding, including information about infant CPR. We recommend that you put your baby to sleep on his or her back to reduce the chance of Sudden Infant Death Syndrome (SIDS). To maintain a healthy environment in which your child can grow, it's best to keep your home smoke-free. By preparing ahead, your transition into parenthood will go smoothly for you and your baby.    Your midwife or physician will want to see you for a checkup 2 to 6 weeks after delivery.    If you have questions about any symptoms you are experiencing or any other concerns, call your provider or their clinic staff at [facility]ICINE/OB at Phone: [phone]. If it's after clinic hours, physician patients should call the Care Connection at [phone]); midwife patients should call their answering service at [phone].    How can you care for yourself at home?   You can refer to the Starting Out Right book or find it online at [email or web address]-[email or web address] or [email or web address]/images/[email or web address]/[email or web address]-starting-out-right/[email or web address]-starting-out-right.html#p=8     You can sign up for a weekly parenting e-mail that gives support, tips and advice from health care professionals that starts with pregnancy and continues through the toddler years. To register, go to [email or web address] at any time during your pregnancy.        Making Plans for Feeding My Baby    By this point, you probably have read a lot about feeding your baby.  Breastfeed or formula? Each mother s decision is her own and [facility] respects you and your choices. We ve gathered information on both breastfeeding and formula feeding to help with your decision. Talking with your physician or nurse-midwife can also help in your  decision.  However you plan to feed your baby, [facility] encourage rooming in with your baby, skin-to-skin contact and feeding your baby based on his or her cues.    Skin-to-skin contact  Being close to mom helps your baby adjust to life outside of the womb.  It helps your baby regulate their body temperature, heart rate, and breathing.  Your baby will usually be placed skin-to-skin immediately following birth or as soon as possible, if medical intervention is needed.    Rooming-In  Having your baby stay with you in your room is called  rooming-in .  Keeping your baby in your room helps you to learn how to care for your baby by getting to know your baby s cues, body rhythms and sleep cycle.       Cue-based feeding  Cues (signals) are baby s way of telling you what he or she wants.  When you learn your infant s cues, you know how to care for and feed your baby.   Feeding cues are the licking and smacking of lips, bringing their fist to their mouth, and a reflex called  rooting - where baby turns and opens his or her mouth, searching for the breast or bottle.  Crying is a late feeding cue.  Babies can feed frequently, often at least 8 times in 24 hours.  Breastfeeding facts  Breast milk is the best source of nutrition for your baby and is available at birth. In the first couple of days, your milk volume is already starting to increase, though it may not be noticeable. Breastfeed frequently to increase your milk supply. Within three to five days, you will begin to notice larger milk volumes. An increase in breast size, heaviness and firmness are often described as the milk  coming in.  Frequent breastfeeding can help breasts from getting overly firm and painful. You will know the baby is getting enough milk if your baby is having wet and dirty diapers and gaining weight.     If your goal is to exclusively breastfeed, it is important to not use any formula or artificial nipples (including bottles  and pacifiers) while your baby is learning to breastfeed.  While it may seem like an  easy  option to give your baby a bottle, formula should only be given if there is a medical reason for your baby to have it.    Positioning and attachment   Get comfortable.  Use pillows as needed to support your arms and baby.  Hold baby close at the level of your breast, facing you in a tummy to tummy position.  Skin to skin helps with this.  Position the baby with his or her nose by the nipple.  There should be a straight line from baby s ear to shoulder to hips.  Tickle your baby s lips or wait for baby to open mouth wide, bring baby to breast by leading with the chin.  Aim the nipple at the roof of baby s mouth.  A rapid sucking pattern is followed by longer, drawing pattern with occasional swallows heard.  When baby is correctly latched, your nipple and much of the areola are pulled well into baby s mouth.      Returning to work or school  Focus on a good start to breastfeeding.  Many women continue to provide breastmilk for their baby when they return to work or school.  Making plans about where to pump and store milk can make the transition go well.  Talk with other mothers who have also returned to work or school for tips and support.  Your employer s Human Resource department may be a resource as well.     Returning to work or school: (continued)     babies can mean fewer  sick  days for you.    A quality breast pump will also save time and add comfort.  Check with your insurance prior to giving birth for breast pump coverage.  Many insurance companies include a pump within your benefits.    Wait until your baby is at least three weeks old to introduce a bottle for the first time.  Have someone besides you give the bottle.    Breastfeed when you are with your baby. Reserve your bottles of breast milk for when you are away.     Your breasts will need to be  emptied  either by your baby or a pump.  Plan to pump at  least twice in an eight hour day.    If you cannot pump at work, continue breastfeeding at home. Any amount of breast milk is worth giving to your baby.    Formula feeding facts  If you are planning to use formula to feed your baby, you will want to make some preparations ahead of time. Talk to your doctor or nurse-midwife about what type of formula to use. Some are iron-fortified, meaning they have extra iron in them. You will want to purchase formula and bottles before your baby is born to be sure you are ready after you return from the hospital. The [facility] do not provide formula samples to take home.    Be sure to follow formula mixing directions closely. Regular milk in the dairy case at the grocery store should not be given to babies under 1 year old. Baby formula is sold in several forms including:    Ready-to-use. This is the most expensive, but no mixing is necessary.    Concentrated liquid. This is less expensive than ready-to-use and you mix with water.    Powder. This is the least expensive. You mix one level scoop of powdered formula with two ounces of water and stir well.    Most babies need 2.5 ounces of formula per pound of body weight each day. This means an 8-pound baby may drink about 20 ounces of formula a day; however, this is just an estimate. The most important thing is to pay attention to your baby s cues.  If your baby is always fussy, needs more iron or has certain food allergies, your physician may suggest you change your baby s formula to a different kind.   How do I warm my baby s bottles?  You may feed your baby a bottle without warming it first. It is OK for the breast milk or formula to be cool or room temperature. If your baby seems to prefer it warmed, you can put the filled bottle in a container of warm water and let it stand for a few minutes. Check the temperature of the liquid on your skin before feeding it to your baby; to be sure it isn t too hot. Do not heat  bottles in the microwave. Microwaves heat food and liquids unevenly, and this can cause hot spots that can burn your baby.    How do I clean and sterilize bottles?  Sterilize bottles and nipples before you use them for the first time. You can do this by putting them in boiling water for 5 minutes. After that first time, you can wash them in hot and soapy water. Rinse them carefully to be sure there is no soap left on them. You can also wash them in the .    [facility] Connection [phone] ([phone])

## 2021-05-27 NOTE — PROGRESS NOTES
S: Doing well.  No regular contractions.  Group B strep negative and hemoglobin 12.9.  She did see OB/GYN last week who cleared her for a trial of labor after .  They did tell her cervix is 1-2 cm.  He had a previous  because of fetal distress, baby being occiput posterior and a double nuchal cord.  Patient remembers having a lot of acid reflux in labor and felt like she was going to vomit with pushing.  She is wonder if there is anything she can do about that.  No other questions.    O: Deep tendon reflexes 2+, no lower extremity edema    A /P: 37-4/7 weeks gestation, doing well.  [place] [facility] [phone].  [place] online for [place].  Tums, max of 4 a day if needed for acid reflux.  Now or in labor, can use Pepcid 20 mg up to 2 times a day if needed for reflux.  Follow-up in 1 week.

## 2021-05-28 ASSESSMENT — ANXIETY QUESTIONNAIRES
GAD7 TOTAL SCORE: 4
GAD7 TOTAL SCORE: 3

## 2021-05-28 NOTE — ANESTHESIA POSTPROCEDURE EVALUATION
Patient: Tracey Crawford    Anesthesia type: epidural    Patient location: Labor and Delivery  Last vitals: No vitals data found for the desired time range.    Post vital signs: stable  Level of consciousness: awake and responds to simple questions  Post-anesthesia pain: pain controlled  Post-anesthesia nausea and vomiting: no  Pulmonary: unassisted, return to baseline  Cardiovascular: stable and blood pressure at baseline  Hydration: adequate  Anesthetic events: no    QCDR Measures:  ASA# 11 - Telma-op Cardiac Arrest: ASA11B - Patient did NOT experience unanticipated cardiac arrest  ASA# 12 - Telma-op Mortality Rate: ASA12B - Patient did NOT die  ASA# 13 - PACU Re-Intubation Rate: NA - No ETT / LMA used for case  ASA# 10 - Composite Anes Safety: ASA10A - No serious adverse event    Additional Notes:

## 2021-05-28 NOTE — PATIENT INSTRUCTIONS - HE
If you are having regular contractions every 10-15 minutes, or your water breaks (with a gush or slow constant trickle), call the hospital.    We will have a biophysical profile and non stress test on Saturday, 4/27 to make sure baby looks good.    OB apt at 1:40 on Monday 4-29.  Also kept the next Wed apt.      Patient Education   HEALTHY PREGNANCY CARE: 37 to 41 WEEKS PREGNANT    Talk with your midwife or physician about when to call with signs of labor    Regular uterine contractions that are getting closer together and/or stronger    If you think your water has broken or is leaking    Bleeding from the vagina like a period (bloody vaginal discharge is normal)    If you are not feeling your baby move    Make plans for transportation and  as needed for when you are going to the hospital.    Your midwife or physician may offer to check your cervix for changes.     Ask your health care provider about vaccinations you may need following delivery. By now, you should have received a Tdap immunization to protect against pertussis or whooping cough. Fathers and family members who will be in close contact with the baby should also receive a Tdap shot at least two weeks before the expected birth of the baby if they have not had a Td (tetanus) shot for at least two years.    If you are past your due date, discuss the next steps leading to delivery with your midwife or physician. If you don't start labor on your own by 41 or 42 weeks, your midwife or physician may recommend giving you medicines to ripen your cervix and start labor.    Preparing for your baby: Tell your midwife or physician how you plan to feed your baby (breast or bottle), who you have chosen to do pediatric care for your baby, and if you have a boy, whether you have chosen to have him circumcised. You will need a car seat correctly installed in your vehicle to bring your baby home. As you start to set up the nursery at home for your baby, make  sure the crib is safe. The mattress needs to fit snugly against the edges of the crib. If you can fit a soda can between the bars, they are too far apart and can allow the baby's head to caught between them.    Learn about infant care and feeding, including information about infant CPR. We recommend that you put your baby to sleep on his or her back to reduce the chance of Sudden Infant Death Syndrome (SIDS). To maintain a healthy environment in which your child can grow, it's best to keep your home smoke-free. By preparing ahead, your transition into parenthood will go smoothly for you and your baby.    Your midwife or physician will want to see you for a checkup 2 to 6 weeks after delivery.    If you have questions about any symptoms you are experiencing or any other concerns, call your provider or their clinic staff at [facility]/OB at Phone: [phone]. If it's after clinic hours, physician patients should call the [facility] at [phone]); midwife patients should call their answering service at [phone].    How can you care for yourself at home?   You can refer to the Starting Out Right book or find it online at [email or web address] or [email or web address]-[email or web address]-starting-out-[email or web address]#[email or web address]     You can sign up for a weekly parenting e-mail that gives support, tips and advice from health care professionals that starts with pregnancy and continues through the toddler years. To register, go to [email or web address] at any time during your pregnancy.        Making Plans for Feeding My Baby    By this point, you probably have read a lot about feeding your baby.  Breastfeed or formula? Each mother s decision is her own and [facility] respects you and your choices. We ve gathered information on both breastfeeding and formula feeding to help with your  decision. Talking with your physician or nurse-midwife can also help in your decision.  However you plan to feed your baby, [facility] encourage rooming in with your baby, skin-to-skin contact and feeding your baby based on his or her cues.    Skin-to-skin contact  Being close to mom helps your baby adjust to life outside of the womb.  It helps your baby regulate their body temperature, heart rate, and breathing.  Your baby will usually be placed skin-to-skin immediately following birth or as soon as possible, if medical intervention is needed.    Rooming-In  Having your baby stay with you in your room is called  rooming-in .  Keeping your baby in your room helps you to learn how to care for your baby by getting to know your baby s cues, body rhythms and sleep cycle.       Cue-based feeding  Cues (signals) are baby s way of telling you what he or she wants.  When you learn your infant s cues, you know how to care for and feed your baby.   Feeding cues are the licking and smacking of lips, bringing their fist to their mouth, and a reflex called  rooting - where baby turns and opens his or her mouth, searching for the breast or bottle.  Crying is a late feeding cue.  Babies can feed frequently, often at least 8 times in 24 hours.  Breastfeeding facts  Breast milk is the best source of nutrition for your baby and is available at birth. In the first couple of days, your milk volume is already starting to increase, though it may not be noticeable. Breastfeed frequently to increase your milk supply. Within three to five days, you will begin to notice larger milk volumes. An increase in breast size, heaviness and firmness are often described as the milk  coming in.  Frequent breastfeeding can help breasts from getting overly firm and painful. You will know the baby is getting enough milk if your baby is having wet and dirty diapers and gaining weight.     If your goal is to exclusively breastfeed, it  is important to not use any formula or artificial nipples (including bottles and pacifiers) while your baby is learning to breastfeed.  While it may seem like an  easy  option to give your baby a bottle, formula should only be given if there is a medical reason for your baby to have it.    Positioning and attachment   Get comfortable.  Use pillows as needed to support your arms and baby.  Hold baby close at the level of your breast, facing you in a tummy to tummy position.  Skin to skin helps with this.  Position the baby with his or her nose by the nipple.  There should be a straight line from baby s ear to shoulder to hips.  Tickle your baby s lips or wait for baby to open mouth wide, bring baby to breast by leading with the chin.  Aim the nipple at the roof of baby s mouth.  A rapid sucking pattern is followed by longer, drawing pattern with occasional swallows heard.  When baby is correctly latched, your nipple and much of the areola are pulled well into baby s mouth.      Returning to work or school  Focus on a good start to breastfeeding.  Many women continue to provide breastmilk for their baby when they return to work or school.  Making plans about where to pump and store milk can make the transition go well.  Talk with other mothers who have also returned to work or school for tips and support.  Your employer s Human Resource department may be a resource as well.     Returning to work or school: (continued)     babies can mean fewer  sick  days for you.    A quality breast pump will also save time and add comfort.  Check with your insurance prior to giving birth for breast pump coverage.  Many insurance companies include a pump within your benefits.    Wait until your baby is at least three weeks old to introduce a bottle for the first time.  Have someone besides you give the bottle.    Breastfeed when you are with your baby. Reserve your bottles of breast milk for when you are away.     Your  breasts will need to be  emptied  either by your baby or a pump.  Plan to pump at least twice in an eight hour day.    If you cannot pump at work, continue breastfeeding at home. Any amount of breast milk is worth giving to your baby.    Formula feeding facts  If you are planning to use formula to feed your baby, you will want to make some preparations ahead of time. Talk to your doctor or nurse-midwife about what type of formula to use. Some are iron-fortified, meaning they have extra iron in them. You will want to purchase formula and bottles before your baby is born to be sure you are ready after you return from the hospital. The [facility] do not provide formula samples to take home.    Be sure to follow formula mixing directions closely. Regular milk in the dairy case at the grocery store should not be given to babies under 1 year old. Baby formula is sold in several forms including:    Ready-to-use. This is the most expensive, but no mixing is necessary.    Concentrated liquid. This is less expensive than ready-to-use and you mix with water.    Powder. This is the least expensive. You mix one level scoop of powdered formula with two ounces of water and stir well.    Most babies need 2.5 ounces of formula per pound of body weight each day. This means an 8-pound baby may drink about 20 ounces of formula a day; however, this is just an estimate. The most important thing is to pay attention to your baby s cues.  If your baby is always fussy, needs more iron or has certain food allergies, your physician may suggest you change your baby s formula to a different kind.   How do I warm my baby s bottles?  You may feed your baby a bottle without warming it first. It is OK for the breast milk or formula to be cool or room temperature. If your baby seems to prefer it warmed, you can put the filled bottle in a container of warm water and let it stand for a few minutes. Check the temperature of the liquid on your  skin before feeding it to your baby; to be sure it isn t too hot. Do not heat bottles in the microwave. Microwaves heat food and liquids unevenly, and this can cause hot spots that can burn your baby.    How do I clean and sterilize bottles?  Sterilize bottles and nipples before you use them for the first time. You can do this by putting them in boiling water for 5 minutes. After that first time, you can wash them in hot and soapy water. Rinse them carefully to be sure there is no soap left on them. You can also wash them in the .    [facility] Connection [phone] ([phone])

## 2021-05-28 NOTE — ANESTHESIA PROCEDURE NOTES
Epidural Block    Patient location during procedure: OB  Time Called: 4/29/2019 11:08 AM  Reason for Block:labor epidural  Staffing:  Performing  Anesthesiologist: Valeriy Rudolph MD  Preanesthetic Checklist  Completed: patient identified, risks, benefits, and alternatives discussed, timeout performed, consent obtained, at patient's request, airway assessed, oxygen available, suction available, emergency drugs available and hand hygiene performed  Procedure  Patient position: sitting  Prep: ChloraPrep  Patient monitoring: continuous pulse oximetry  Approach: midline  Location: L3-L4  Injection technique: EDELMIRA saline  Number of Attempts:1  Needle  Needle type: Oziel   Needle gauge: 18 G     Catheter in Space: 5  Assessment  Sensory level: T8  No complications      Additional Notes:  Called to patient's room for epidural  Consent obtained  Timeput performed  RN at bedside during procedure

## 2021-05-28 NOTE — ANESTHESIA PREPROCEDURE EVALUATION
Anesthesia Evaluation      Patient summary reviewed   No history of anesthetic complications     Airway   Mallampati: II   Pulmonary - negative ROS and normal exam                          Cardiovascular - negative ROS  Exercise tolerance: > or = 4 METS  Rhythm: regular  Rate: normal,         Neuro/Psych    (+) depression, anxiety/panic attacks,     Comments: ADD    Endo/Other    (+) pregnant     GI/Hepatic/Renal - negative ROS      Other findings: Results for JANETTE HWANG (MRN [id number]) as of 4/29/2019 11:02    4/29/2019 10:13  Hemoglobin: 12.9  Platelets: 145  ABORh: O NEG  Antibody Screen: NEG        Dental - normal exam                        Anesthesia Plan  Planned anesthetic: epidural    ASA 2     Anesthetic plan and risks discussed with: patient    Post-op plan: routine recovery

## 2021-05-28 NOTE — PROGRESS NOTES
S: The patient states that she has 1 or 2 uterine cramps a day. She also reports that she has had a sharp cramp in her right inner thigh. She notes that she has had it 5 or 6 times in the past week. She states that the cramp is improved after stretching her right leg. She denies numbness or tingling in her right inner thigh, vaginal bleeding, or vaginal fluid leakage.    O: Deep tendon reflexes 2+    A/P 39 4/7 weeks gestation  If you are having regular contractions every 10-15 minutes, or your water breaks (with a gush or slow constant trickle), call the hospital.  We will have a biophysical profile and non stress test on Monday, 4/29 to make sure baby looks good.  OB apt at 1:40 on Monday 4-29.  Also kept the next Wed apt.    ADDITIONAL HISTORY SUMMARIZED (2): None.  DECISION TO OBTAIN EXTRA INFORMATION (1): None.   RADIOLOGY TESTS (1): None.  LABS (1): Labs ordered and reviewed today  MEDICINE TESTS (1): None.  INDEPENDENT REVIEW (2 each): None.     Total data points: 1    The visit lasted a total of 22 minutes face to face with the patient. Over 50% of the time was spent counseling and educating the patient about prenatal wellness and development.    I, Uzma Fowler, am scribing for and in the presence of, Dr. Esteban at  4/24/19 . 11:33am    I, Dr. Esteban, personally performed the services described in this documentation, as scribed by Uzma Fowler in my presence, and it is both accurate and complete.

## 2021-05-29 NOTE — PROGRESS NOTES
Seen with daughters today.   Acute onset of fever, chills, body aches, and right breast pain that resolved with bath.   Given dicloxacillin with concerns of mastitis.   Return if needed.     This is a no charge visit.     The visit lasted a total of 5 minutes face to face with the patient. Over 50% of the time was spent counseling and educating the patient about mastitis.     I, Uzma Fowler, am scribing for and in the presence of, Dr. Esteban at  5/13/19 . 1:11pm     I, Dr. Esteban, personally performed the services described in this documentation, as scribed by Uzma Fowler in my presence, and it is both accurate and complete.

## 2021-05-30 VITALS — BODY MASS INDEX: 30.05 KG/M2 | HEIGHT: 64 IN | WEIGHT: 176 LBS

## 2021-05-30 VITALS — BODY MASS INDEX: 31.38 KG/M2 | WEIGHT: 177.13 LBS

## 2021-05-30 VITALS — WEIGHT: 163.31 LBS | BODY MASS INDEX: 28.93 KG/M2

## 2021-05-30 VITALS — WEIGHT: 176.38 LBS | BODY MASS INDEX: 31.24 KG/M2

## 2021-05-30 VITALS — BODY MASS INDEX: 31.08 KG/M2 | WEIGHT: 175.44 LBS

## 2021-05-30 VITALS — WEIGHT: 170.56 LBS | BODY MASS INDEX: 30.21 KG/M2

## 2021-05-30 VITALS — WEIGHT: 176.19 LBS | BODY MASS INDEX: 31.21 KG/M2

## 2021-05-30 VITALS — BODY MASS INDEX: 29.29 KG/M2 | WEIGHT: 165.38 LBS

## 2021-05-30 VITALS — BODY MASS INDEX: 29.65 KG/M2 | WEIGHT: 167.38 LBS

## 2021-05-30 VITALS — BODY MASS INDEX: 28.58 KG/M2 | WEIGHT: 161.31 LBS

## 2021-05-30 NOTE — PROGRESS NOTES
Assessment:        Tracey Crawford is a 29 y.o. female here for postpartum exam at 12 weeks postpartum. Pap smear done at today's visit.   1. Postpartum care and examination     2. Eczema, unspecified type  triamcinolone (KENALOG) 0.5 % cream   3. Mixed incontinence urge and stress (male)(female)  Urinalysis-UC if Indicated   4. Atypical nevi  Ambulatory referral to Dermatology   . .    Plan:        1. Limit caffeine to 1 or less cups a day.  If incontinence symptoms are persisting after a few months, please let me know and I can refer you to a urologist.  Kegel exercises printed.  UA today is negative.  Birth control if wishing.  If you are still breast feeding, we will do a progesterone only pill.   Seeing a counselor.   Consult given to [facility] for a full body skin check.   2. Contraception: none  3. Return in about 1 year (around 7/22/2020) for Annual physical.      Subjective:       Tracey Crawford is a 29 y.o. female who presents for a postpartum visit. She is 3 months postpartum following a spontaneous vaginal delivery. I have fully reviewed the prenatal and intrapartum course. The delivery was at 40-2/7 gestational weeks. Outcome: spontaneous vaginal delivery. Postpartum course has been good. Baby's course has been good. Baby is feeding by breast. Bled for  5 weeks postpartum.  Currently bleeding  no bleeding. Bowel function is normal. Bladder function is abnormal: Incontinence.  Patient has not resumed intercourse. Contraception method is none. Postpartum depression screening score: 1 . She is on citalopram and following with a counselor. She denies signs of mastitis, edema, or calf pain.     Urinary incontinence: The patent reports that she has had urinary leakage since delivery. She states that at first she was completely voiding, but now the incontinence is only an uncontrolled trickle. She states that it is not always brought on by a cough or sneeze.  Sometimes she does have urge incontinence.   The patient notes that in May, 2019 she was treated with a UTI. She denies burning with urination.     Eczema: The patient states that she has had an eczema flare up recently.     The following portions of the patient's history were reviewed and updated as appropriate: allergies, current medications and problem list    Review of Systems  General:  Denies problem  Eyes: Denies problem  Ears/Nose/Throat: Denies problem  Cardiovascular: Denies problem  Respiratory:  Denies problem  Gastrointestinal:  Denies problem,   Genitourinary: Urinary incontinence  Musculoskeletal:  Denies problem  Skin: Eczema  Neurologic: Denies problem  Psychiatric: Denies problem  Endocrine: Denies problem  Heme/Lymphatic: Denies problem   Allergic/Immunologic: Denies problem      PMFSH  Denies family history of skin cancer.    Objective:         Vitals:    07/22/19 1826   BP: 95/50   Pulse: 79   Temp: 97.9  F (36.6  C)   TempSrc: Oral   Weight: 168 lb 9.6 oz (76.5 kg)       Physical Exam:  General Appearance: Alert, cooperative, no distress, appears stated age  Head: Normocephalic, without obvious abnormality, atraumatic  Eyes: PERRL, conjunctiva/corneas clear, EOM's intact  Ears: Normal TM's and external ear canals, both ears  Nose: Nares normal, septum midline,mucosa normal, no drainage  Throat: Lips, mucosa, and tongue normal; teeth and gums normal  Neck: Supple, symmetrical, trachea midline, no adenopathy;  thyroid: not enlarged, symmetric, no tenderness/mass/nodules; no carotid bruit or JVD  Back: Symmetric, no curvature, ROM normal, no CVA tenderness  Lungs: Clear to auscultation bilaterally, respirations unlabored  Breasts: No breast masses, tenderness, asymmetry, or nipple discharge.  Heart: Regular rate and rhythm, S1 and S2 normal, no murmur, rub, or gallop,   Abdomen: Soft, non-tender, bowel sounds active all four quadrants,  no masses, no organomegaly  Pelvic:Normally developed genitalia with no external lesions or eruptions.  Vagina and cervix show no lesions, inflammation, discharge or tenderness. No cystocele, No rectocele. Uterus lemon-sized.  No adnexal mass or tenderness.  Extremities: Extremities normal, atraumatic, no cyanosis or edema  Skin: Skin color, texture, turgor normal. 2x3 mm dark brown macule on the RUQ abdomen.   Lymph nodes: Cervical, supraclavicular, and axillary nodes normal  Neurologic: Normal     ADDITIONAL HISTORY SUMMARIZED (2): None.  DECISION TO OBTAIN EXTRA INFORMATION (1): None.   RADIOLOGY TESTS (1): None.  LABS (1): Labs ordered today. 5/17/19 labs reviewed.  MEDICINE TESTS (1): None.  INDEPENDENT REVIEW (2 each): None.     Total data points: 1    The visit lasted a total of 30 minutes face to face with the patient. Over 50% of the time was spent counseling and educating the patient about urinary incontinence.    IUzma, am scribing for and in the presence of, Dr. Esteban on  7/22/19 at 6:52pm    I, Dr. Esteban, personally performed the services described in this documentation, as scribed by Uzma Fowler in my presence, and it is both accurate and complete.

## 2021-05-31 VITALS — BODY MASS INDEX: 26.52 KG/M2 | WEIGHT: 154.5 LBS

## 2021-06-01 VITALS — WEIGHT: 149.13 LBS | BODY MASS INDEX: 25.6 KG/M2

## 2021-06-01 NOTE — TELEPHONE ENCOUNTER
Refill Approved    Rx renewed per Medication Renewal Policy. Medication was last renewed on 1/30/2019 for [facility]  Last OV 7/22/2019  Patient is completely out and is requesting bridge doses at local pharmacy.  A 30 day supply was sent to local [facility] per her request.  I noted to pharmacy to call her when ready.    Madeline Lee, [facility] Triage/Med Refill 9/10/2019     Requested Prescriptions   Pending Prescriptions Disp Refills     citalopram (CELEXA) 40 MG tablet 90 tablet 2     Sig: Take 1 tablet (40 mg total) by mouth daily.       SSRI Refill Protocol  Passed - 9/10/2019  6:06 PM        Passed - PCP or prescribing provider visit in last year     Last office visit with prescriber/PCP: 10/10/2018 Alecia Esteban MD OR same dept: 10/24/2018 Kelsi Muller MD OR same specialty: 3/1/2019 Lisette Terrazas DO  Last physical: Visit date not found Last MTM visit: 5/29/2019 Alecia Etseban MD   Next visit within 3 mo: Visit date not found  Next physical within 3 mo: Visit date not found  Prescriber OR PCP: Alecia Esteban MD  Last diagnosis associated with med order: 1. Depression  - citalopram (CELEXA) 40 MG tablet; Take 1 tablet (40 mg total) by mouth daily.  Dispense: 90 tablet; Refill: 2    If protocol passes may refill for 12 months if within 3 months of last provider visit (or a total of 15 months).

## 2021-06-01 NOTE — TELEPHONE ENCOUNTER
Refill Request  Did you contact pharmacy: No  Medication name:   Requested Prescriptions     Pending Prescriptions Disp Refills     citalopram (CELEXA) 40 MG tablet 90 tablet 2     Sig: Take 1 tablet (40 mg total) by mouth daily.     Who prescribed the medication: Alecia Esteban MD    Pharmacy Name and Location: [id number]  Is patient out of medication: Yes  Patient notified refills processed in 72 hours:  yes  Okay to leave a detailed message: yes    Patient is completely out of medication and is asking for a bridge prescription to be sent to local pharmacy, as other request also in chart sent to mail in pharmacy.

## 2021-06-02 VITALS — WEIGHT: 156.4 LBS | BODY MASS INDEX: 26.7 KG/M2 | HEIGHT: 64 IN

## 2021-06-02 VITALS — WEIGHT: 169.4 LBS | BODY MASS INDEX: 29.08 KG/M2

## 2021-06-02 VITALS — BODY MASS INDEX: 29.35 KG/M2 | WEIGHT: 171 LBS

## 2021-06-02 VITALS — BODY MASS INDEX: 29.9 KG/M2 | WEIGHT: 174.2 LBS

## 2021-06-02 VITALS — WEIGHT: 163.4 LBS | BODY MASS INDEX: 28.05 KG/M2

## 2021-06-02 VITALS — BODY MASS INDEX: 26.84 KG/M2 | WEIGHT: 156.38 LBS

## 2021-06-02 VITALS — BODY MASS INDEX: 26.16 KG/M2 | WEIGHT: 152.38 LBS

## 2021-06-02 VITALS — BODY MASS INDEX: 29.39 KG/M2 | WEIGHT: 171.25 LBS

## 2021-06-02 VITALS — BODY MASS INDEX: 28.39 KG/M2 | WEIGHT: 165.4 LBS

## 2021-06-02 NOTE — TELEPHONE ENCOUNTER
Refill Approved    Rx renewed per Medication Renewal Policy. Medication was last renewed on 7.22.19.    Ailyn Gibson, [facility] [place] Connection Triage/Med Refill 10/4/2019     Requested Prescriptions   Pending Prescriptions Disp Refills     citalopram (CELEXA) 40 MG tablet [Pharmacy Med Name: CITALOPRAM HBR 40 MG TABLET] 30 tablet 0     Sig: TAKE 1 TABLET BY MOUTH EVERY DAY       SSRI Refill Protocol  Passed - 10/4/2019  2:07 AM        Passed - PCP or prescribing provider visit in last year     Last office visit with prescriber/PCP: 10/10/2018 Alecia Esteban MD OR same dept: 10/24/2018 Kelsi Muller MD OR same specialty: 3/1/2019 Lisette Terrazas DO  Last physical: Visit date not found Last MTM visit: 5/29/2019 Alecia Esteban MD   Next visit within 3 mo: Visit date not found  Next physical within 3 mo: Visit date not found  Prescriber OR PCP: Alecia Esteban MD  Last diagnosis associated with med order: 1. Depression  - citalopram (CELEXA) 40 MG tablet [Pharmacy Med Name: CITALOPRAM HBR 40 MG TABLET]; TAKE 1 TABLET BY MOUTH EVERY DAY  Dispense: 30 tablet; Refill: 0    If protocol passes may refill for 12 months if within 3 months of last provider visit (or a total of 15 months).

## 2021-06-03 VITALS — WEIGHT: 168.6 LBS | BODY MASS INDEX: 28.94 KG/M2

## 2021-06-03 VITALS — WEIGHT: 177 LBS | BODY MASS INDEX: 30.38 KG/M2

## 2021-06-03 VITALS — BODY MASS INDEX: 30.18 KG/M2 | WEIGHT: 175.8 LBS

## 2021-06-03 VITALS — BODY MASS INDEX: 30.4 KG/M2 | WEIGHT: 177.13 LBS

## 2021-06-04 VITALS
HEART RATE: 73 BPM | WEIGHT: 166.5 LBS | HEIGHT: 64 IN | RESPIRATION RATE: 16 BRPM | BODY MASS INDEX: 28.42 KG/M2 | SYSTOLIC BLOOD PRESSURE: 114 MMHG | DIASTOLIC BLOOD PRESSURE: 56 MMHG

## 2021-06-04 VITALS
HEART RATE: 73 BPM | WEIGHT: 162.13 LBS | DIASTOLIC BLOOD PRESSURE: 61 MMHG | TEMPERATURE: 98.1 F | RESPIRATION RATE: 16 BRPM | SYSTOLIC BLOOD PRESSURE: 118 MMHG | BODY MASS INDEX: 27.83 KG/M2

## 2021-06-05 NOTE — TELEPHONE ENCOUNTER
Medication Question or Clarification  Who is calling: Monae  What medication are you calling about (include dose and sig)?: Citalopram 40 mg, one daily, #seven  Who prescribed the medication?: Alecia Esteban MD   What is your question/concern?: Patient needs a small quantity sent to her local pharmacy while waiting for the mail order supply to arrive.  Requested Pharmacy: [id number], [place], [place]  Okay to leave a detailed message?: Yes

## 2021-06-06 NOTE — PROGRESS NOTES
Assessment:   1. Attention deficit disorder (ADD) without hyperactivity  dextroamphetamine-amphetamine (ADDERALL) 10 mg Tab tablet    Drug Abuse 1+, Urine       Plan:   Restarting Aderall 10 mg in the morning. Can increase to twice a day if needed, last dose not after noon. Do not start until done breastfeeding.  Did controlled substance agreement for Aderall and urine Tox.  Can stop breastfeeding when you wish and the milk will dry up.   Set a physical in 6 months and will do a med check then.      Subjective:  Chief Complaint   Patient presents with     Medication Management     Medication Refill      Tracey Crawford, a 29 y.o. year old, comes in to clinic for an office visit for a med check and refill. She was last seen in clinic 07/22/2019 for a postpartum visit.  She is planning on stopping breast-feeding and hoping to restart Adderall.  She has seen a psychologist and given the formal diagnosis of attention deficit disorder.  She had been on Adderall 10 mg once or twice a day in the past and had done well.  Occasionally some appetite suppression but no insomnia no chest pain palpitation shortness of breath etc.  No side effects from the medication.      Current Outpatient Medications   Medication Sig Note     citalopram (CELEXA) 40 MG tablet Take 1 tablet (40 mg total) by mouth daily.      prenatal vitamin iron-folic acid 27mg-0.8mg (PRENATAL S) 27 mg iron- 800 mcg Tab tablet Take 1 tablet by mouth daily.      triamcinolone (KENALOG) 0.5 % cream APPLY EXTERNALLY TO THE AFFECTED AREA 2 TO 3 TIMES DAILY, NOT ON FACE      albuterol (PROAIR HFA;PROVENTIL HFA;VENTOLIN HFA) 90 mcg/actuation inhaler Inhale 2 puffs every 6 (six) hours as needed for wheezing. 2/20/2019: TAKES PRN     dextroamphetamine-amphetamine (ADDERALL) 10 mg Tab tablet Take 1 tablet by mouth 2 (two) times a day.        Patient Active Problem List   Diagnosis     Attention Deficit Disorder Without Hyperactivity     Major Depression, Recurrent      "Anxiety     Hyperlipidemia     Dyshidrotic eczema     Carpal tunnel syndrome     Atypical nevi     Vulval obstetric varicose veins in third trimester       ROS: No fevers, chills, chest pain, shortness of breath, joint pain, rash, lower extremity edema    Objective:  /56 (Patient Site: Left Arm, Patient Position: Sitting, Cuff Size: Adult Regular)   Pulse 73   Resp 16   Ht 5' 4\" (1.626 m)   Wt 166 lb 8 oz (75.5 kg)   BMI 28.58 kg/m    General: No apparent distress   Cardiovascular: Regular rate and rhythm without murmurs, rubs, or gallops  Lungs: Clear to auscultation bilaterally, no wheezes, crackles, or rhonchi      ADDITIONAL HISTORY SUMMARIZED (2): None.  DECISION TO OBTAIN EXTRA INFORMATION (1): None.   RADIOLOGY TESTS (1): None.  LABS (1): Lab ordered today.  MEDICINE TESTS (1): None.  INDEPENDENT REVIEW (2 each): None.     The visit lasted a total of 15 minutes face to face with the patient. Over 50% of the time was spent counseling and educating the patient about the problem listed above.    I, Vero Roper am scribing for and in the presence of, Dr. Esteban.    I, Dr. Esteban, personally performed the services described in this documentation, as scribed by Vero Roper in my presence, and it is both accurate and complete.    Total data points: 1      "

## 2021-06-06 NOTE — PATIENT INSTRUCTIONS - HE
Rrestarting Aderall 10 mg in the morning. Can increase to twice a day, last dose not after noon. Do not start until done breastfeeding.  Did substance agreement for Aderall and urine Tox.  Can stop breastfeeding when you wish and the milk will dry up.   Set a physical in 6 months and will do a med check then.    If you choose to use Bill Me Later to send a provider a message please keep this very brief.  They should only be used for a follow-up questions to a previous appointment.  New concerns should addressed by a phone call to triage, E -visit or an office visit.  Certainly if it is an emergency call 911. Thank-you.  Your lab results will be communicated to you via letter, [employer] message or phone call when they are all back.  Please refrain from sending messages on one specific lab until they are all back.  Thank you.

## 2021-06-06 NOTE — TELEPHONE ENCOUNTER
Refill Approved    Rx renewed per Medication Renewal Policy. Medication was last renewed on 2/10/2020.    Ov: 3/1/19    Chanel Jones, [facility] Triage/Med Refill 2/14/2020     Requested Prescriptions   Pending Prescriptions Disp Refills     citalopram (CELEXA) 40 MG tablet [Pharmacy Med Name: CITALOPRAM TAB 40MG] 90 tablet 2     Sig: TAKE 1 TABLET DAILY       SS[facility] Refill Protocol  Passed - 2/10/2020  9:54 AM        Passed - PCP or prescribing provider visit in last year     Last office visit with prescriber/PCP: 10/10/2018 Alecia Esteban MD OR same dept: Visit date not found OR same specialty: 3/1/2019 Lisette Terrazas DO  Last physical: Visit date not found Last MTM visit: 5/29/2019 Alecia Esteban MD   Next visit within 3 mo: Visit date not found  Next physical within 3 mo: Visit date not found  Prescriber OR PCP: Alecia Esteban MD  Last diagnosis associated with med order: 1. Depression  - citalopram (CELEXA) 40 MG tablet [Pharmacy Med Name: CITALOPRAM TAB 40MG]; TAKE 1 TABLET DAILY  Dispense: 90 tablet; Refill: 2    If protocol passes may refill for 12 months if within 3 months of last provider visit (or a total of 15 months).

## 2021-06-07 NOTE — TELEPHONE ENCOUNTER
Controlled Substance Refill Request  Medication Name:   Requested Prescriptions     Pending Prescriptions Disp Refills     dextroamphetamine-amphetamine (ADDERALL) 10 mg Tab tablet 60 tablet 0     Sig: Take 1 tablet by mouth 2 (two) times a day.     Date Last Fill: 2/26/20  [facility]ed [facility]  Submit electronically to pharmacy  Controlled Substance Agreement on file:   Encounter-Level CSA Scan Date:    There are no encounter-level csa scan date.        Last office visit:  2/26/20

## 2021-06-08 ENCOUNTER — OFFICE VISIT (OUTPATIENT)
Dept: URGENT CARE | Facility: URGENT CARE | Age: 31
End: 2021-06-08
Payer: COMMERCIAL

## 2021-06-08 VITALS
OXYGEN SATURATION: 97 % | SYSTOLIC BLOOD PRESSURE: 111 MMHG | BODY MASS INDEX: 26.85 KG/M2 | HEART RATE: 83 BPM | WEIGHT: 156.4 LBS | DIASTOLIC BLOOD PRESSURE: 73 MMHG | TEMPERATURE: 98.9 F

## 2021-06-08 DIAGNOSIS — M26.609 TMJ (TEMPOROMANDIBULAR JOINT SYNDROME): Primary | ICD-10-CM

## 2021-06-08 DIAGNOSIS — H92.02 OTALGIA, LEFT: ICD-10-CM

## 2021-06-08 PROCEDURE — 99213 OFFICE O/P EST LOW 20 MIN: CPT | Performed by: NURSE PRACTITIONER

## 2021-06-08 RX ORDER — BUPROPION HYDROCHLORIDE 150 MG/1
TABLET ORAL
COMMUNITY
Start: 2020-11-24 | End: 2021-10-20

## 2021-06-08 RX ORDER — MULTIVITAMIN,THERAPEUTIC
1 TABLET ORAL
COMMUNITY

## 2021-06-08 RX ORDER — ALBUTEROL SULFATE 90 UG/1
2 AEROSOL, METERED RESPIRATORY (INHALATION)
COMMUNITY
Start: 2021-05-13

## 2021-06-08 NOTE — PROGRESS NOTES
She denies any headaches or vision change, and denies any redness or warmth in her calves. She has swelling in her hands and feet, which is worsening.  She has constant numbness in her right hand up to the elbow.  She started wearing a wrist right splint at night, but continues to have right wrist numbness which is constantly bothering her, and wakes her up at night. When she wakes up, her right hand and wrist are bothering, as well as up to the elbow. She has a family history of carpal tunnel. Has congestion and sneezing when she wakes up in the morning. She declined a flu vaccine today. Alpha-fetoprotein was normal, cell-free DNA test was normal, and 20 week ultrasound was normal, and 1 hour glucose challenge was normal at 126. She knows the sex of the baby. Her WBC was elevated at 15.1 on 1/19/17, will repeat CBC today. She says her hormones are catching up with her and she has days when she is suddenly tearful and sobbing.  Denies suicidal or homicidal ideation.  She is not having problems taking care of herself.  She continues to see a psychiatrist and has not followed up with her therapist recently. Her PHQ-9 score today is 3.  Exam: Patellar reflexes 2+. 1+ edema to mid-shin. No pain, redness, or warmth of calves.     May wear compression socks for swelling and for comfort during the day at work. Referral to hand surgeon at [facility] hand for carpal tunnel.   May use nasal saline spray to help with congestion.   UA today. Rhogam shot today.   Return in 2 weeks for next routine OB visit. UA with every subsequent visit.     The visit lasted a total of 21 minutes face to face with the patient. Over 50% of the time was spent counseling and educating the patient about routine OB visit.  I, Monisha Alston, am scribing for and in the presence of, Dr. Alecia Esteban.  I, Dr. Alecia Esteban, personally performed the services described in this documentation, as scribed by Monisha Alston in my presence, and it  is both accurate and complete.

## 2021-06-08 NOTE — PROGRESS NOTES
Assessment & Plan     TMJ (temporomandibular joint syndrome)      Otalgia, left    Patient with left ear pain with normal canal and TM exam.  No adenopathy.  Does have TMJ that has been diagnosed on the same side.  Has not had treatment for this.  Explained that sometimes TMJ pain can be felt in the ear and they are mistaken for each other.      Suggested ice packs to jaw, ibuprofen as needed, ice also to the neck as this was sore at the same time.  Purchase mouthguard from [employer] to help with clenching and to use at night.  Follow-up as planned in a week for TMJ.               No follow-ups on file.    Kenna Williamson, [facility] URG[facility]    Shasha Webb is a 31 year old female who presents to clinic today for the following health issues:  Chief Complaint   Patient presents with     Otitis Media     this morning pt noticed that she might have an ear infection     HPI    Patient woke up and felt pain in the inside of her left ear.  Did recently get a pool and got some water in her ears. No change in hearing.     Also was recently diagnosed with TMJ and does have an appointment related to this next week.  Has not been treated for this.  Feels she clenches her teeth at night.    Also noted left-sided neck and upper back pain this morning.      Review of Systems    No other complaints.  No cough, congestion, fever, drainage from ear.      Objective    /73   Pulse 83   Temp 98.9  F (37.2  C) (Tympanic)   Wt 70.9 kg (156 lb 6.4 oz)   SpO2 97%   BMI 26.85 kg/m    Physical Exam  Constitutional:       General: She is not in acute distress.     Appearance: She is well-developed.   HENT:      Right Ear: Tympanic membrane, ear canal and external ear normal.      Left Ear: Tympanic membrane, ear canal and external ear normal. There is no impacted cerumen.   Eyes:      General:         Right eye: No discharge.         Left eye: No discharge.      Conjunctiva/sclera:  Conjunctivae normal.   Neck:      Musculoskeletal: Muscular tenderness (Left paracervical muscles) present.   Pulmonary:      Effort: Pulmonary effort is normal.   Musculoskeletal: Normal range of motion.   Lymphadenopathy:      Cervical: No cervical adenopathy.   Skin:     General: Skin is warm and dry.      Capillary Refill: Capillary refill takes less than 2 seconds.   Neurological:      Mental Status: She is alert and oriented to person, place, and time.   Psychiatric:         Mood and Affect: Mood normal.         Behavior: Behavior normal.         Thought Content: Thought content normal.         Judgment: Judgment normal.

## 2021-06-08 NOTE — PROGRESS NOTES
ASSESSMENT & PLAN:  1. Cough  Influenza A/B Rapid Test    albuterol nebulizer solution 2.5 mg (PROVENTIL)       Patient Instructions   Saline nasal spray for congestion.     Nebulizer treatment in clinic today.     Albuterol (Ventolin) inhaler 2 puffs up to 4 times daily as needed for cough, with spacer.     Robitussin with codeine 1-2 tsp at bedtime as needed to help with coughing at night and sleep.     Z-pack: Azithromycin 2 pills for the first day, and then 1 pill daily for 4 more days.     Note written for time off from work while sick. 2-8 and 2-9.    Return in 1 week for routine OB visit.         Orders Placed This Encounter   Procedures     Influenza A/B Rapid Test     There are no discontinued medications.  Administrations This Visit     albuterol nebulizer solution 2.5 mg (PROVENTIL)     Admin Date Action Dose Route Administered By             02/08/2017 Given 2.5 mg Inhalation Vitaly Leary LPN                        Return in about 1 week (around 2/15/2017) for routine OB visit.    CHIEF COMPLAINT:  Chief Complaint   Patient presents with     Cough     deep cough, sob, congestion, abdominal muscles hurting from coughing x 4 days         HISTORY OF PRESENT ILLNESS:  Tracey is a 26 y.o. female presenting to the clinic today for cough during pregnancy, accompanied by her mother Valeria.     Cough: She has a deep productive cough, which has been going on for 4 days and is waking her up about every 2 hours at night. She has shortness of breath. She was taking Robitussin but did not find it helpful. She feels congested in her cheeks, but not pain or pressure. She denies ear pain or thick rhinorrhea. She denies a sore throat, though she says her coughing has made her throat somewhat sore. She says her muscles are sore and tired from the coughing. She is drinking enough fluids, but it is hard to drink because she cannot breath through her nose.     REVIEW OF SYSTEMS:   She is pregnant, and denies any vaginal  bleeding or fluid leakage. She denies any signs of  labor. All other systems are negative.    PFSH:  She is getting a 3D ultrasound on 2/10/17 to get pictures of the baby.     TOBACCO USE:  History   Smoking Status     Never Smoker   Smokeless Tobacco     Not on file       VITALS:  Vitals:    17 1433   BP: 102/58   Patient Site: Left Arm   Patient Position: Sitting   Cuff Size: Adult Regular   Pulse: (!) 104   Resp: 20   Temp: 98.2  F (36.8  C)   TempSrc: Oral   Weight: 163 lb 5 oz (74.1 kg)     Wt Readings from Last 3 Encounters:   17 163 lb 5 oz (74.1 kg)   17 165 lb 6 oz (75 kg)   17 161 lb 5 oz (73.2 kg)     Body mass index is 28.93 kg/(m^2).    PHYSICAL EXAM:  General Appearance: Alert, cooperative, no distress, appears stated age  Head: Normocephalic, without obvious abnormality, atraumatic  Ears: Normal TM's and external ear canals, both ears  Nose: Nares normal, septum midline, mucosa normal  Throat: Lips, mucosa, and tongue normal; teeth and gums normal  Lungs: respirations unlabored; rhonchi anteriorly, and coarse breath sounds in all lungs fields posteriorly.   Heart: Regular rate and rhythm, S1 and S2 normal, no murmur, rub   or gallop  Fetal Heart Rate: 133 bpm    ADDITIONAL HISTORY SUMMARIZED (2): None.  DECISION TO OBTAIN EXTRA INFORMATION (1): None.   RADIOLOGY TESTS (1): None.  LABS (1): Ordered influenza swab.  MEDICINE TESTS (1): None.  INDEPENDENT REVIEW (2 each): None.     The visit lasted a total of 20 minutes face to face with the patient. Over 50% of the time was spent counseling and educating the patient about cough and pregnancy.    IMonisha, am scribing for and in the presence of, Dr. Esteban.    IDr. Esteban personally performed the services described in this documentation, as scribed by Monisha Alston in my presence, and it is both accurate and complete.    MEDICATIONS:  Current Outpatient Prescriptions   Medication Sig Dispense Refill      citalopram (CELEXA) 20 MG tablet TAKE 2 TABLETS BY MOUTH DAILY 180 tablet 3     prenatal vitamin iron-folic acid 27mg-0.8mg (PRENATAL S) 27 mg iron- 800 mcg Tab tablet Take 1 tablet by mouth daily.       triamcinolone (KENALOG) 0.5 % cream Apply 2-3 times daily to affected area(s). 30 g 0     albuterol (PROVENTIL HFA;VENTOLIN HFA) 90 mcg/actuation inhaler Inhale 2 puffs every 6 (six) hours as needed for wheezing. 1 Inhaler 1     azithromycin (ZITHROMAX) 250 MG tablet 2 pills on day 1, then 1 pill a day for 4 days 6 tablet 1     codeine-guaiFENesin (GUAIFENESIN AC)  mg/5 mL liquid Take 5-10 mL by mouth bedtime as needed. 240 mL 0     No current facility-administered medications for this visit.        Total data points: 1

## 2021-06-08 NOTE — PROGRESS NOTES
She is feeling much better from her cough on 2/8/17, but continues to have some rhinorrhea. She is still taking amoxicillin. Her ankle swelling is worsening. She denies any redness, warmth, or pain in her calves. She was not able to buy compression socks OTC because they were out of stock at the store, but is still planning on buying compression socks. She thinks she has an external yeast infection and was wondering if it is safe to treat with Monastat. She has not seen a hand surgeon yet for her carpal tunnel, which she says has improved this past week because she did not work very much because of her cough. She wanted to discuss the risks and benefits of an epidural during labor and was informed of the pain management options for labor.     Exam: No tenderness, redness, or pain in her calves. Reflexes 2+.     UA today revealed moderate leukocytes but is otherwise negative. Return in 2 weeks for OB visit. Cervical checks at every visit starting at 34 weeks. Group B strep swab at 36 weeks. Knee-high support socks for comfort, on in the morning, off at night. Monastat external cream twice daily for 1 week. Rhogam shot already given. Hand surgeon referral if needed.    The visit lasted a total of 17 minutes face to face with the patient. Over 50% of the time was spent counseling and educating the patient about routine OB visit, edema, and yeast infection.  I, Monisha Alston, am scribing for and in the presence of, Dr. Alecia Esteban.  I, Dr. Alecia Esteban, personally performed the services described in this documentation, as scribed by Monisha Alston in my presence, and it is both accurate and complete.

## 2021-06-08 NOTE — TELEPHONE ENCOUNTER
Medication:   dextroamphetamine-amphetamine (ADDERALL) 10 mg Tab tablet  60 tablet         Last Date Filled 04/07/20     pulled: NO  Unable to pull  for provider    Only PCP Prescribing?: Unknown    Taken as prescribed from physician notes?: YES  Restarting Aderall 10 mg in the morning. Can increase to twice a day if needed, last dose not after noon. Do not start until done breastfeeding.  Did controlled substance agreement for Aderall and urine Tox.    CSA in last year: YES    Random Utox in last year: YES    Opioids + benzodiazepines? NO

## 2021-06-08 NOTE — TELEPHONE ENCOUNTER
Controlled Substance Refill Request  Medication Name:   Requested Prescriptions     Pending Prescriptions Disp Refills     dextroamphetamine-amphetamine (ADDERALL) 10 mg Tab tablet 60 tablet 0     Sig: Take 1 tablet by mouth 2 (two) times a day.     Date Last Fill: 4/7/20  [facility]  Submit electronically to pharmacy  Controlled Substance Agreement on file:   Encounter-Level CSA Scan Date:    There are no encounter-level csa scan date.        Last office visit:  2/26/20

## 2021-06-08 NOTE — TELEPHONE ENCOUNTER
Patient called stating that she is sure she has a bladder infection.     Frequency and pain.  Per protocol, MD visit within 24 hours.     Referred to [email or web address].  Patient stated understanding.    Elsa Moore RN/[facility] Nurse Advisors    COVID 19 Nurse Triage Plan/Patient Instructions    Please be aware that novel coronavirus (COVID-19) may be circulating in the community. If you develop symptoms such as fever, cough, or SOB or if you have concerns about the presence of another infection including coronavirus (COVID-19), please contact your health care provider or visit [email or web address].     Disposition/Instructions    Patient to have an [facility] Visit with a provider (Preferred option). Follow System Ambulatory Workflow for COVID 19. It is recommended that you setup an [facility] Visit with one of our virtual providers.  To do this follow these instructions:    1. Go to the website [email or web address]  2. Create an account (you will need your insurance information)  3. Start a new visit  4. Choose your diagnosis (e.g. COVID19)  5. Fill out the information about your symptoms  6. A provider will reach out to you by text, phone call or video visit based on your request    Call Back If: Your symptoms worsen before you are able to complete your [facility] Visit with a provider.    Thank you for limiting contact with others, wearing a simple mask to cover your cough, practice good hand hygiene habits and accessing our virtual services where possible to limit the spread of this virus.    For more information about COVID19 and options for caring for yourself at home, please visit the CDC website at https://www.cdc.gov/coronavirus/2019-ncov/about/steps-when-sick.html  For more options for care at [facility], please visit our website at [email or web address]    For more information, please use the [place] Department of Health COVID-19 Website:  [email or web address]/diseases/coronavirus/index.html  [place] Department of Health ([facility]) COVID-19 Hotlines (Interpreters available):      Health questions: Phone Number: [phone] or 1-100.536.4872 and Hours: 7 a.m. to 7 p.m.    Schools and  questions: Phone Number: [phone] or [phone] and Hours 7 a.m. to 7 p.m.    Reason for Disposition    Urinating more frequently than usual (i.e., frequency)    Protocols used: URINARY SYMPTOMS-A-AH

## 2021-06-09 NOTE — TELEPHONE ENCOUNTER
RN Triage:    Patient is calling with c/o: abdominal pain    Says she only notices it with position changes or stretching. When she stands up or sits down, it hurts. Also says when she touches it, it hurts.    First noticed it about 3 weeks ago and says it has not gotten better or worse     Rates it at a 4/10 on the pain scale    Denies diarrhea or constipation  Denies nausea or vomiting  Denies all urinary symptoms    Denies fever, chills, changes in breathing, chest pain    Does not think she could be pregnant       PLAN:  Per protocol, patient should be evaluated. Warm transfer to scheduling to set up a video visit with Dr. Esteban on Thursday (6/25) at 10:20 am. Patient is to call back if symptoms get worse or if other questions arise.      Shreya Napier RN      Reason for Disposition    Abdominal pain is a chronic symptom (recurrent or ongoing AND lasting > 4 weeks)    Additional Information    Negative: Passed out (i.e., fainted, collapsed and was not responding)    Negative: Shock suspected (e.g., cold/pale/clammy skin, too weak to stand, low BP, rapid pulse)    Negative: Sounds like a life-threatening emergency to the triager    Negative: Chest pain    Negative: Pain is mainly in upper abdomen (if needed ask: 'is it mainly above the belly button?')    Negative: Abdominal pain and pregnant > 20 weeks    Negative: Abdominal pain and pregnant < 20 weeks    Negative: SEVERE abdominal pain (e.g., excruciating)    Negative: Vomiting red blood or black (coffee ground) material    Negative: Bloody, black, or tarry bowel movements    Negative: Constant abdominal pain lasting > 2 hours    Negative: Vomiting bile (green color)    Negative: Patient sounds very sick or weak to the triager    Negative: Vomiting and abdomen looks much more swollen than usual    Negative: White of the eyes have turned yellow (i.e., jaundice)    Negative: Blood in urine (red, pink, or tea-colored)    Negative: Fever > 103 F (39.4 C)     Negative: Fever > 101 F (38.3 C) and over 60 years of age    Negative: Fever > 100.0 F (37.8 C) and has diabetes mellitus or a weak immune system (e.g., HIV positive, cancer chemotherapy, organ transplant, splenectomy, chronic steroids)    Negative: Fever > 100.0 F (37.8 C) and bedridden (e.g., nursing home patient, stroke, chronic illness, recovering from surgery)    Negative: Pregnant or could be pregnant (i.e., missed last menstrual period)    Negative: MODERATE OR MILD pain that comes and goes (cramps) lasts > 24 hours    Negative: Unusual vaginal discharge    Negative: Age > 60 years    Negative: Patient wants to be seen    Protocols used: ABDOMINAL PAIN - FEMALE-A-OH

## 2021-06-09 NOTE — PROGRESS NOTES
She is doing well and feeling uncomfortable. She has swelling in her hands, feet, ankles, and lower legs, which was at its worst last night. She denies any numbness or cold feet. She is wondering how long she is able to continue working, because she is always on her feet at work, and she is tolerating working well. She is wondering if waxing the pubic area would be harmful for the baby. She already had the Rhogam shot. Normal alpha-fetoprotein. UA today shows moderate leukocytes, but protein and ketones are normal and resolved from UA on 3/6/17. She can work as a  as long as she is comfortable with this and no medical reason not to.  No lifting over 20 pounds. Waxing should be fine.  Exam: Patellar reflexes 2+.     Plan: Return in 2 weeks for next OB visit, then weekly, or sooner if needed. Group B strep at 37 weeks. UA today, see above. Recheck white blood cell count and hemoglobin today because they were elevated on 1/19/17.     The visit lasted a total of 19 minutes face to face with the patient. Over 50% of the time was spent counseling and educating the patient about routine OB visit.  I, Monisha Alston, am scribing for and in the presence of, Dr. Alecia Esteban.  I, Dr. Alecia Esteban, personally performed the services described in this documentation, as scribed by Monisha Alston in my presence, and it is both accurate and complete.

## 2021-06-09 NOTE — TELEPHONE ENCOUNTER
Controlled Substance Refill Request  Medication Name:   Requested Prescriptions     Pending Prescriptions Disp Refills     dextroamphetamine-amphetamine (ADDERALL) 10 mg Tab tablet 60 tablet 0     Sig: Take 1 tablet by mouth 2 (two) times a day.     Date Last Fill: 5/14/2020  Is patient out of medication?: N/A - electronic request  Patient notified refills processed within 3 business days: N/A - electronic request  [facility]  Submit electronically to pharmacy  Controlled Substance Agreement on file:   Encounter-Level CSA Scan Date:    There are no encounter-level csa scan date.        Last office visit:  2/26/2020

## 2021-06-09 NOTE — PROGRESS NOTES
She was vomiting last night from 7 pm to 1 am, as well diarrhea. 8 am was the last time she had diarrhea. She drank a lot of water last night, which she thinks made her throw up, so now she is eating ice chips. She has occasional heart burn, but denies reflux symptoms. She is feeling weak, but feels improved from last night. She denies feeling feverish or chilled. She says there have been periods of time when it is harder to feel baby, but she does continue to have movement. She had a cortisone shot for her carpal tunnel, which has been very helpful for her symptoms. She is continuing to wear wrist splints at night. She says her ankle swelling has improved since her cortisone shots. She denies pain, redness, or warmth of her calves. She and her  did her hospital tour yesterday. She has some tenderness near the umbilicus, which started prior to her current illness. It is achy to touch. She previously had her umbilicus pierced. UA revealed 130 ketones, 30 protein, and trace leukocytes in urine.   Exam: Patellar reflexes 2+.    Plan: Return in 2 weeks for next routine OB visit, then every week after. Continue taking ice chips, and if you continue to tolerate ice, may start taking sips of water. If you cannot tolerate water for 24 hours total, go to the hospital for IV fluids. Take Pepcid or TUMs for heart burn.  Recheck CBC at next visit. TDaP and UA today. Referral to dermatology for full body skin check after delivery.    The visit lasted a total of 18 minutes face to face with the patient. Over 50% of the time was spent counseling and educating the patient about GI virus and routine OB visit.  I, Monisha Alston, am scribing for and in the presence of, Dr. Alecia Esteban.  I, Dr. Alecia Estbean, personally performed the services described in this documentation, as scribed by Monisha Alston in my presence, and it is both accurate and complete.

## 2021-06-09 NOTE — PROGRESS NOTES
She has significant swelling in her legs, and her carpal tunnel has returned since her steroid injection. She wears her wrist braces at night and continues to have numbness during the day. Her white blood cell count continues to be mildly elevated and will recheck today. Discussed signs of labor and when to call labor and delivery. She is registered at [facility] for delivery.   Exam: Patellar reflexes 2+.     Plan: Group B strep test today. UA today. Recheck hemoglobin and white blood count today. If carpal tunnel worsens and hands are dead-feeling, will refer back to neurology. Return in 1 week for routine OB visit.     The visit lasted a total of 17 minutes face to face with the patient. Over 50% of the time was spent counseling and educating the patient about routine OB visit.    I, Monisha Alston, am scribing for and in the presence of, Dr. Alecia Esteban MD.  I, Dr. Alecia Esteban MD, personally performed the services described in this documentation, as scribed by Monisha Alston in my presence, and it is both accurate and complete.

## 2021-06-10 NOTE — ANESTHESIA PROCEDURE NOTES
Epidural Block    Patient location during procedure: OB  Time Called: 4/19/2017 12:36 PM  Reason for Block:labor epidural  Staffing:  Performing  Anesthesiologist: TATIANNA PETER A  Preanesthetic Checklist  Completed: patient identified, risks, benefits, and alternatives discussed, timeout performed, consent obtained, at patient's request, airway assessed, oxygen available, suction available, emergency drugs available and hand hygiene performed  Procedure  Patient position: sitting  Prep: ChloraPrep and site prepped and draped  Patient monitoring: continuous pulse oximetry, heart rate and blood pressure  Approach: midline  Location: L3-L4  Injection technique: EDELMIRA air  Number of Attempts:1  Needle  Needle type: Oziel   Needle gauge: 18 G     Catheter in Space: 3  Assessment  Sensory level:  No complications      Additional Notes:  All risks of a labor epidural were explained to the patient. The patient understands the risks of a labor epidural and wishes to proceed.

## 2021-06-10 NOTE — PROGRESS NOTES
She has had some back pain which felt like a constant aching, in her lower back, which resolved with Tylenol. Her UA today was normal, except for moderate leukocytes. She is feeling baby movement, and the baby is kicking in her ribs. She denies feeling any regular squeezing or contractions. Her Group B strep test was negative. She is wearing her night splints for her carpal tunnel, which is about the same as the last few weeks. She has noticed some trigger finger and catching when she squeezes her hands, and this goes away after she squeezes and stretches her hands a few times. She had her Rhogam shot at 28 weeks.   Exam: Patellar reflexes 2+.     Plan: Follow-up in 1 week  The visit lasted a total of 16 minutes face to face with the patient. Over 50% of the time was spent counseling and educating the patient about routine OB visit.    IMonisha, am scribing for and in the presence of, Dr. Alecia Esteban MD.  I, Dr. Alecia Esteban MD, personally performed the services described in this documentation, as scribed by Monisha Alston in my presence, and it is both accurate and complete.

## 2021-06-10 NOTE — PATIENT INSTRUCTIONS - HE
Set physical and med check in February.  We will update urine tox and controlled substance agreement then.  Please come fasting.    UA today.    Added Wellbutrin to take daily for 1 week before your period and throughout your period for decreased mood during period.

## 2021-06-10 NOTE — ANESTHESIA POSTPROCEDURE EVALUATION
Patient: Tracey Crawford   SECTION  Anesthesia type: regional    Patient location: Labor and Delivery  Last vitals:   Vitals:    17 0200   BP: 100/51   Pulse: 78   Resp: 17   Temp: 36.9  C (98.5  F)   SpO2: 95%     Post vital signs: stable  Level of consciousness: awake and responds to simple questions  Post-anesthesia pain: pain controlled  Post-anesthesia nausea and vomiting: no  Pulmonary: unassisted, return to baseline  Cardiovascular: stable and blood pressure at baseline  Hydration: adequate  Anesthetic events: no    QCDR Measures:  ASA# 11 - Telma-op Cardiac Arrest: ASA11B - Patient did NOT experience unanticipated cardiac arrest  ASA# 12 - Telma-op Mortality Rate: ASA12B - Patient did NOT die  ASA# 13 - PACU Re-Intubation Rate: ASA13B - Patient did NOT require a new airway mgmt  ASA# 10 - Composite Anes Safety: ASA10A - No serious adverse event  ASA# 38 - New Corneal Injury: ASA38A - No new exposure keratitis or corneal abrasion in PACU    Additional Notes:

## 2021-06-10 NOTE — ANESTHESIA PREPROCEDURE EVALUATION
Anesthesia Evaluation      Patient summary reviewed   No history of anesthetic complications     Airway   Mallampati: II  Neck ROM: full   Pulmonary - negative ROS                          Cardiovascular - negative ROS  (+) , hypercholesterolemia,      Neuro/Psych - negative ROS   (+) depression, anxiety/panic attacks,     Comments: ADD    Endo/Other    (+) pregnant     GI/Hepatic/Renal    (+) GERD,             Dental                         Anesthesia Plan  Planned anesthetic: epidural    ASA 2     Anesthetic plan and risks discussed with: patient and spouse    Post-op plan: routine recovery and epidural analgesia        Addendum:    Proceeded to stat  for NRFHT after pushing. Epidural in place.    Siena Perez MD  Staff Anesthesiologist  [facility]  17

## 2021-06-10 NOTE — TELEPHONE ENCOUNTER
Controlled Substance Refill Request  Medication Name:   Requested Prescriptions     Pending Prescriptions Disp Refills     dextroamphetamine-amphetamine (ADDERALL) 10 mg Tab tablet 60 tablet 0     Sig: Take 1 tablet by mouth 2 (two) times a day.     Date Last Fill: 7/10/20  [facility]  [facility] to [facility]  Controlled Substance Agreement on file:   Encounter-Level CSA Scan Date:    There are no encounter-level csa scan date.        Last office visit:  2/26/20

## 2021-06-10 NOTE — PROGRESS NOTES
Assessment:  Attention deficit disorder  1. Urinary frequency  Urinalysis-UC if Indicated    Culture, Urine    Culture, Urine   2. Episode of recurrent major depressive disorder, unspecified depression episode severity (H)  buPROPion (WELLBUTRIN XL) 150 MG 24 hr tablet   3. Attention deficit disorder (ADD) without hyperactivity         Plan:  Continue medication at current dose.  Stop medication and seek medical attention if any palpitations, chest pain or shortness of breath.  Urine tox up-to-date and done Feb. 2020.  Controlled substance agreement up-to-date and done Feb. 2020.  Follow-up in 6 months for medication or set as a physical if due. Periodic consultation with psychology to re-evalute diagnosis.    Subjective:  Chief Complaint   Patient presents with     Medication Management     6 month follow up. CSA done 2/2020     Tracey Crawford is a 30 y.o. year old with diagnosis of attention deficit disorder.  They are currently taking Adderall  at 10 mg two times a day.  They are feeling like this medication is controlling the symptoms. Denies chest pain, shortness of breath, palpitations, anorexia, insomnia.  No history of an eating disorder.  No known heart disease . Has been evaluated by psychologist to get the diagnosis.    Depression: Patient is on citalopram and it works well for her.  She feels like with her menstrual cycle she will get more mood changes and depression.  She is wonder if there could be any additional medication.  No other concerns.    Objective:  /61 (Patient Site: Left Arm, Patient Position: Sitting, Cuff Size: Adult Regular)   Pulse 73   Temp 98.1  F (36.7  C) (Oral)   Resp 16   Wt 162 lb 2 oz (73.5 kg)   LMP 08/22/2020 (Approximate)   BMI 27.83 kg/m    Heart: Regular rate and rhythm without murmur  Lungs: Clear to auscultation bilaterally

## 2021-06-10 NOTE — PROGRESS NOTES
She is feeling good, but is uncomfortable. She denies any contractions at this point, but has occasional tightness. Discussed signs of labor and when to go labor and delivery at [facility]. Her Group B strep was negative. Her swelling in in her hands and feet up to her knees is unchanged from previously, but her blood pressure is good. She is wearing wrist splints at night for carpal tunnel, which has worsened slightly. Her third and fourth fingers on her right hand are constantly numb. Her whole hand becomes numb when she is holding her hand up for a period of time, such as when eating. She is having difficulty squeezing her hand and cannot fully make a fist. Unrelated to pregnancy, she has some small red bumps on the back of her right hand, which have been there for a long time, and is wondering if they need to be treated in some way. She does not regularly see dermatology, but agrees to a dermatology consultation after delivery. She is wondering how many weeks of maternity leave are recommended, as she does not have paid maternity leave, and was advised 6 weeks of leave, unless she is feeling very well and ready to return to work at 4 weeks postpartum. She would like her membranes stripped today. UA today is normal except small leukocytes. Her hemoglobin at 36 weeks was 13.     Exam: Patellar reflexes 2+. Two brown crusted papules on dorsum of right hand.     Plan: If not delivered by next visit, will discuss induction for 41 weeks. Dermatology consultant after recovered from delivery for full body skin check and red bumps on hand. Membranes stripped today.     The visit lasted a total of 20 minutes face to face with the patient. Over 50% of the time was spent counseling and educating the patient about routine OB visit.    Monisha SINGH, [place] scribing for and in the presence of, Dr. Alecia Esteban MD.  IDr. Alecia MD, personally performed the services described in this documentation, as scribed  by Monisha Alston in my presence, and it is both accurate and complete.

## 2021-06-11 NOTE — PROGRESS NOTES
Assessment:        Tracey Crawford is a 27 y.o. female here for postpartum exam at 7 weeks postpartum. Pap smear done at today's visit.     1. Postpartum care and examination  Gynecologic Cytology (PAP Smear)   2. Atypical nevi  Ambulatory referral to Dermatology       Plan:        1.  Dermatology consult given for full body skin check.  2. Contraception: condoms. Let us know if wanting a contraceptive.   3. No Follow-up on file.     4. Return to work note written.     Subjective:       Tracey Crawford is a 27 y.o. female who presents for a postpartum visit. She is 7 weeks postpartum following a low cervical transverse  section. I have fully reviewed the prenatal and intrapartum course. The delivery was at 39w2d gestational weeks. Outcome: primary  section, low transverse incision. Postpartum course has been good. Baby's course has been good. She has followed up with the OB surgeon and has no concerns of infection at the site of incision. She has no concerns of mastitis and denies fevers, chills, redness, pain, and swelling of the breasts. She is breastfeeding and occasionally supplementing with formula if needed. She has seen a lactation consultant, who assured her she is producing enough milk. She was concerned the baby is spitting up, though this has improved over the past week. The baby spits up whenever she sleeps on her back in the bassinet and instead has been co-sleeping on her shoulder, to elevate her head while she sleeps. Currently bleeding  no bleeding. Bowel function is normal. Bladder function is normal. Patient has not resumed intercourse. Contraception method is condoms. Postpartum depression screening score: 0. She denies any concerns of postpartum depression.     Cadidiasis Albicans: She thinks she has a external yeast infection, which she treated with 7-day Monostat. She thinks the infection has resolved, but wants to be rechecked for infection.        The following portions of the  patient's history were reviewed and updated as appropriate: allergies, current medications and problem list    Review of Systems  General:  Denies problem  Eyes: Denies problem  Ears/Nose/Throat: Denies problem  Cardiovascular: Denies problem  Respiratory:  Denies problem  Gastrointestinal:  Denies problem, Genitourinary: Denies problem  Musculoskeletal:  Denies problem  Skin: Denies problem  Neurologic: Denies problem  Psychiatric: Denies problem  Endocrine: Denies problem  Heme/Lymphatic: Denies problem   Allergic/Immunologic: Denies problem        Objective:         Vitals:    06/07/17 1041   BP: 112/64   Pulse: 76   Resp: 16   Temp: 98  F (36.7  C)   TempSrc: Oral   Weight: 154 lb 8 oz (70.1 kg)       Physical Exam:  General Appearance: Alert, cooperative, no distress, appears stated age  Head: Normocephalic, without obvious abnormality, atraumatic  Eyes: PERRL, conjunctiva/corneas clear, EOM's intact  Ears: Normal TM's and external ear canals, both ears  Nose: Nares normal, septum midline,mucosa normal, no drainage  Throat: Lips, mucosa, and tongue normal; teeth and gums normal  Neck: Supple, symmetrical, trachea midline, no adenopathy;  thyroid: not enlarged, symmetric, no tenderness/mass/nodules; no carotid bruit or JVD  Back: Symmetric, no curvature, ROM normal, no CVA tenderness  Lungs: Clear to auscultation bilaterally, respirations unlabored  Breasts: No breast masses, tenderness, asymmetry, or nipple discharge.  Heart: Regular rate and rhythm, S1 and S2 normal, no murmur, rub, or gallop, Abdomen: Soft, non-tender, bowel sounds active all four quadrants,  no masses, no organomegaly  Pelvic:Normally developed genitalia with no external lesions or eruptions. Vagina and cervix show no lesions, inflammation, discharge or tenderness. No cystocele, No rectocele. Uterus lemon-sized.  No adnexal mass or tenderness. Scar over suprapubic region well-healed, no pain to palpation and no signs of  infection  Extremities: Extremities normal, atraumatic, no cyanosis or edema  Skin: Skin color, texture, turgor normal, no rashes, 3 mm white fibrous nodule with slight pigmentation around periphery on right posterior forearm.  Lymph nodes: Cervical, supraclavicular, and axillary nodes normal  Neurologic: Normal       The visit lasted a total of 20 minutes face to face with the patient. Over 50% of the time was spent counseling and educating the patient about post partum visit.    I, Monisha Alston, am scribing for and in the presence of, Dr. Alecia Esteban MD.    I, Dr. Alecia Esteban MD, personally performed the services described in this documentation, as scribed by Monisha Alston in my presence, and it is both accurate and complete.

## 2021-06-12 NOTE — TELEPHONE ENCOUNTER
Controlled Substance Refill Request  Medication Name:   Requested Prescriptions     Pending Prescriptions Disp Refills     dextroamphetamine-amphetamine (ADDERALL) 10 mg Tab tablet 60 tablet 0     Sig: Take 1 tablet by mouth 2 (two) times a day.     Date Last Fill: 8/25/20  [facility]  [facility] to [facility]  Controlled Substance Agreement on file:   Encounter-Level CSA Scan Date:    There are no encounter-level csa scan date.        Last office visit:  8/27/20

## 2021-06-13 NOTE — TELEPHONE ENCOUNTER
Refill Approved    Rx renewed per Medication Renewal Policy. Medication was last renewed on 2/10/20.    Helga Mariee, [facility] Triage/Med Refill 11/19/2020     Requested Prescriptions   Pending Prescriptions Disp Refills     citalopram (CELEXA) 40 MG tablet 7 tablet 0     Sig: Take 1 tablet (40 mg total) by mouth daily.       SSRI Refill Protocol  Passed - 11/17/2020 11:27 AM        Passed - PCP or prescribing provider visit in last year     Last office visit with prescriber/PCP: 8/27/2020 Alecia Esteban MD OR same dept: 8/27/2020 Alecia Esteban MD OR same specialty: 8/27/2020 Alecia Esteban MD  Last physical: Visit date not found Last MTM visit: 5/29/2019 Alecia Esteban MD   Next visit within 3 mo: Visit date not found  Next physical within 3 mo: Visit date not found  Prescriber OR PCP: Alecia Esteban MD  Last diagnosis associated with med order: 1. Depression  - citalopram (CELEXA) 40 MG tablet; Take 1 tablet (40 mg total) by mouth daily.  Dispense: 7 tablet; Refill: 0    If protocol passes may refill for 12 months if within 3 months of last provider visit (or a total of 15 months).

## 2021-06-15 PROBLEM — G56.00 CARPAL TUNNEL SYNDROME: Status: ACTIVE | Noted: 2017-02-02

## 2021-06-15 NOTE — TELEPHONE ENCOUNTER
Medication: adderall 10 mg    Last Date Filled 12/24/2020     pulled: no- ca not authorized    Only PCP Prescribing?: unknown    Taken as prescribed from physician notes?: unknown    CSA in last year: YES 02/28/2020    Random Utox in last year: YES    Opioids + benzodiazepines? NO

## 2021-06-15 NOTE — TELEPHONE ENCOUNTER
Controlled Substance Refill Request  Medication Name:   Requested Prescriptions     Pending Prescriptions Disp Refills     dextroamphetamine-amphetamine (ADDERALL) 10 mg Tab tablet 60 tablet 0     Sig: Take 1 tablet by mouth 2 (two) times a day.     Date Last Fill: 12/24/2020  [facility]  [facility] to [facility]  Controlled Substance Agreement on file:   Encounter-Level CSA Scan Date:    There are no encounter-level csa scan date.        Last office visit:  8/27/2020         Heather Negron RN, BSN Nurse Triage Advisor 5:25 PM 2/3/2021

## 2021-06-16 PROBLEM — O22.13: Status: ACTIVE | Noted: 2019-03-01

## 2021-06-16 PROBLEM — R68.84 JAW PAIN: Status: ACTIVE | Noted: 2021-05-13

## 2021-06-16 PROBLEM — D22.9 ATYPICAL NEVI: Status: ACTIVE | Noted: 2019-02-20

## 2021-06-16 NOTE — TELEPHONE ENCOUNTER
Controlled Substance Refill Request  Medication Name:   Requested Prescriptions     Pending Prescriptions Disp Refills     dextroamphetamine-amphetamine (ADDERALL) 10 mg Tab tablet 60 tablet 0     Sig: Take 1 tablet by mouth 2 (two) times a day.     Date Last Fill: 2/4/21  [facility]  [facility] to [facility]  Controlled Substance Agreement on file:   Encounter-Level CSA Scan Date:    There are no encounter-level csa scan date.        Last office visit:  8/27/20        
Yes

## 2021-06-17 NOTE — PATIENT INSTRUCTIONS - HE
Referring to a hand surgeon at [facility] for bilateral carpal tunnel.  [facility]  PHONE: [phone]    CT scan showed gallstones.  If you have abdominal pain in your right upper abdomen after eating please let me know and we would have you see a surgeon.    It is possible your lower pelvic pain is from the  scar.  Recommend some scar massage.  We can refer to PT if needed certainly if it is getting worse I want to know about that.    Referral given to oral surgeon to be assessed for TMJ pain.  [facility]  PHONE: [phone]    Albuterol inhaler as needed can use 2 puffs before exercise.  I will send a refill.    Today did a urine tox and controlled substance agreement for Adderall 10 mg twice a day max of 60 per 30 days.    I would recommend reestablishing care with a therapist please.    Set physical in 6 months.

## 2021-06-17 NOTE — TELEPHONE ENCOUNTER
Telephone Encounter by Latoya Keller CMA at 7/10/2020  3:40 PM     Author: Latoya Keller CMA Service: -- Author Type: Certified Medical Assistant    Filed: 7/10/2020  3:42 PM Encounter Date: 7/9/2020 Status: Signed    : Latoya Keller CMA (Certified Medical Assistant)       Medication: Dextroamp-amphetamin     Last Date Filled 5/14/2020     pulled: YES      Only PCP Prescribing?: YES    Taken as prescribed from physician notes?: YES  Plan:   Restarting Aderall 10 mg in the morning. Can increase to twice a day if needed, last dose not after noon. Do not start until done breastfeeding.  Did controlled substance agreement for Aderall and urine Tox.  Can stop breastfeeding when you wish and the milk will dry up.   Set a physical in 6 months and will do a med check then.       CSA in last year: YES 2/28/2020    Random Utox in last year: YES 2/26/2020    Opioids + benzodiazepines? NO

## 2021-06-17 NOTE — TELEPHONE ENCOUNTER
Telephone Encounter by Latoya Keller CMA at 4/7/2020  1:06 PM     Author: Latoya Keller CMA Service: -- Author Type: Certified Medical Assistant    Filed: 4/7/2020  1:10 PM Encounter Date: 4/6/2020 Status: Signed    : Latoya Keller CMA (Certified Medical Assistant)       Medication: ADDERALL    Last Date Filled 2/26/2020     pulled: YES       Only PCP Prescribing?: YES    Taken as prescribed from physician notes?: YES  Plan:   Restarting Aderall 10 mg in the morning. Can increase to twice a day if needed, last dose not after noon. Do not start until done breastfeeding.  Did controlled substance agreement for Aderall and urine Tox.    CSA in last year: YES 2/28/2020    Random Utox in last year: YES    Opioids + benzodiazepines? NO

## 2021-06-17 NOTE — PROGRESS NOTES
ASSESSMENT & PLAN:  1. Health maintenance examination  Healthy female exam.  We will update fasting lipids at a future visit.  Follow for those results when available.  Pap is up-to-date, no other specific concerns or complaints today.  Follow-up for annual exam next year unless concerns arise sooner.  - Lipid Belcher FASTING; Future  - Glucose; Future  - Hemoglobin; Future  - Thyroid Belcher; Future      There are no Patient Instructions on file for this visit.    Orders Placed This Encounter   Procedures     Lipid Belcher FASTING     Standing Status:   Future     Standing Expiration Date:   10/12/2018     Order Specific Question:   Fasting is required?     Answer:   Yes     Glucose     Standing Status:   Future     Standing Expiration Date:   10/12/2018     Hemoglobin     Standing Status:   Future     Standing Expiration Date:   10/12/2018     Thyroid Belcher     Standing Status:   Future     Standing Expiration Date:   10/12/2018     Medications Discontinued During This Encounter   Medication Reason     triamcinolone (KENALOG) 0.5 % cream Reorder           General  Immunizations reviewed and updated .  Alcohol use, safety and moderation discussed.  Recommended adequate calcium intake/osteoporosis prevention.  Discussed colon cancer screening at age 50, 45 if -American.  Diet and exercise reviewed, including goal of aerobic exercise 30-90 minutes most days of the week, moderation of portions sizes, avoiding eating out and fast food and increase in fruits and vegetables.  Discussed safe sex practices.  Discussed & recommended seat belt (& motorcycle helmet) use.  Discussed & recommended smoke detector.  Discussed sun protection.  Discussed weight management.    The following high BMI interventions were performed this visit: encouragement to exercise, weight monitoring and dietary management education, guidance, and counseling    CHIEF COMPLAINT:  Chief Complaint   Patient presents with     Annual Exam     pt  is not not fasting last at at 215pm        HISTORY OF PRESENT ILLNESS:  Tracey is a 27 y.o. female presenting to the clinic today for a physical examination.  Patient is feeling well and has no specific concerns or complaints.  She has a couple of slightly raised keratinaceous lesions on her arm and her leg that she plans to go to dermatology for for removal.  Otherwise feeling well.  She is not fasting for lab work.  She is up-to-date on her Pap smear from her previous pregnancy.  She has a 1-year-old daughter.  They plan on trying to get pregnant again likely starting in the fall.  She has recently started exercising at [employer], doing well with this.  Trying to watch what she eats as well and is happy with how her weight is normalizing.  She states that she stopped breast-feeding about 3 months ago, still occasionally have very slight production with manual manipulation of breasts, we discussed that this is normal especially so close to finishing breast-feeding.  Periods have returned to normal, she has gotten one regularly for the past 2 months.    REVIEW OF SYSTEMS:   All other systems are negative.    PFSH:  Social History:  The patient reports that there is not domestic violence in her life.     Regular Exercise: yes  Sunscreen Use: yes  Healthy Diet: yes  Dental Visits Regularly: yes  Sexually active: yes  Colonoscopy: not applicable  Prevention of Osteoporosis: not applicable   Last DXA: not applicable    Social History     Social History     Marital status:      Spouse name: N/A     Number of children: N/A     Years of education: N/A     Occupational History     Not on file.     Social History Main Topics     Smoking status: Never Smoker     Smokeless tobacco: Never Used     Alcohol use No     Drug use: No     Sexual activity: Not on file     Other Topics Concern     Not on file     Social History Narrative       History   Smoking Status     Never Smoker   Smokeless Tobacco     Never Used        Family History:  Family History   Problem Relation Age of Onset     Depression Mother      Diabetes Father      Alcohol abuse Father      recovered     Hypertension Father      Depression Sister      Eczema Sister      Depression Brother      Hypertension Paternal Grandfather      Diabetes Paternal Grandfather      Cancer Paternal Grandfather      Prostate       Past Medical History:  Active Ambulatory Problems     Diagnosis Date Noted     Attention Deficit Disorder Without Hyperactivity      Major Depression, Recurrent      Anxiety 2015     Hyperlipidemia 2015     Dyshidrotic eczema 2016     Pregnant 2016     Carpal tunnel syndrome 2017     S/P primary low transverse  2017     Resolved Ambulatory Problems     Diagnosis Date Noted     Eczema      Depression      Joint Stiffness Finger(S)      Past Medical History:   Diagnosis Date     Joint Stiffness Finger(S)        No Known Allergies    Immunization History   Administered Date(s) Administered     DTaP, historic 1988, 1988, 1988, 1990, 1993     HPV Quadrivalent 2010, 2011, 2011     Hep A, historic 2010, 10/17/2011     Hep B, historic 1997, 2000, 10/02/2001     HiB, historic,unspecified 1992     IPV 1988, 1988, 1988, 1993     MMR 1990, 2000     Tdap 10/08/2009, 2017     Immunization status: up to date and documented    Gynecologic History:  Patient's last menstrual period was 2018.  Contraception:barrier methods  Last Pap: . Results were: normal  Last mammogram: na.     OB History:  OB History      Para Term  AB Living    2 1 1 0 1 1    SAB TAB Ectopic Multiple Live Births    0 0 0 0 1          Surgical History:  Past Surgical History:   Procedure Laterality Date      SECTION N/A 2017    Procedure:  SECTION;  Surgeon: Ilir Dimas MD;  Location: [facility]  L+D OR;  Service:        VITALS:  Vitals:    04/12/18 1640   BP: 116/80   Patient Site: Left Arm   Patient Position: Sitting   Cuff Size: Adult Regular   Pulse: 70   Resp: 14   Weight: 149 lb 2 oz (67.6 kg)     Wt Readings from Last 3 Encounters:   04/12/18 149 lb 2 oz (67.6 kg)   06/07/17 154 lb 8 oz (70.1 kg)   04/19/17 176 lb (79.8 kg)       PHYSICAL EXAM:  General Appearance: Reveals an alert, cooperative 27 year old female.   Vitals:  Per nursing notes.  Head: Normocephalic, without obvious abnormality, atraumatic   Eyes: PERRL, conjunctiva/corneas clear, EOM's intact   Ears: Normal TM's and external ear canals, both ears   Oropharynx: Nares normal, septum midline, mucosa normal, no drainage, lips, and tongue normal   Neck: Supple, symmetrical, trachea midline, no adenopathy; thyroid: not enlarged, symmetric, no tenderness/mass/nodules   Back: Symmetric, no curvature, ROM normal, no CVA tenderness   Lungs: Clear to auscultation bilaterally, respirations unlabored, no wheezing or rales   Heart: Regular rate and rhythm, S1 and S2 normal, no murmur, rub, or gallop, no carotid bruits  Breasts: No breast masses, tenderness, asymmetry, or nipple discharge.   Abdomen: Soft, non-tender, no masses, no organomegaly,  Pelvic: Normal-appearing female genitalia. No adnexal masses or cervical motion tenderness on bimanual exam.   Extremities: Extremities normal, atraumatic, no cyanosis or edema   Skin: Skin color, texture, turgor normal, no rashes or lesions   Lymph nodes: Cervical, supraclavicular, and axillary nodes normal.  Neurologic: Normal   Psych: Normal affect. Does not appear anxious or depressed.     DATA REVIEWED:  Additional History from Old Records or Another Person Summarized (2 total): None.     Decision to Obtain Extra information (1 total): None.     Radiology Tests Summarized and Ordered (XRAY/CT/MRI/DXA) (1 total): None.    Labs Reviewed and Ordered (1 total): None.    Medicine Tests Summarized and Ordered  (EKG/ECHO/COLONOSCOPY/EGD) (1 total): None.    Independent Review of EKG or X-Ray (2 each): None.    The visit lasted a total of 40 minutes face to face with the patient. Over 50% of the time was spent conducting the physical and in coordination of care.      MEDICATIONS:  Current Outpatient Prescriptions   Medication Sig Dispense Refill     citalopram (CELEXA) 40 MG tablet TAKE 1 TABLET(40 MG) BY MOUTH DAILY 90 tablet 0     dextroamphetamine-amphetamine (ADDERALL) 10 mg Tab tablet Take 1 tablet by mouth daily. 30 tablet 0     prenatal vitamin iron-folic acid 27mg-0.8mg (PRENATAL S) 27 mg iron- 800 mcg Tab tablet Take 1 tablet by mouth daily.       triamcinolone (KENALOG) 0.5 % cream APPLY EXTERNALLY TO THE AFFECTED AREA 2 TO 3 TIMES DAILY 60 g 3     No current facility-administered medications for this visit.        Total Data Points:     Michelle Pina CNP    This note has been dictated using voice recognition software. Any grammatical or context distortions are unintentional and inherent to the software

## 2021-06-17 NOTE — PATIENT INSTRUCTIONS - HE
Patient Instructions by Uzma Fowler Scribe at 7/22/2019  6:00 PM     Author: Uzma Fowler Scribe Service: -- Author Type: Vandana    Filed: 7/22/2019  7:28 PM Encounter Date: 7/22/2019 Status: Addendum    : Alecia Esteban MD (Physician)    Related Notes: Original Note by Alecia Esteban MD (Physician) filed at 7/22/2019  7:01 PM       Limit caffeine to 1 or less cups a day.  If incontinence symptoms are persisting after a few months, please let me know and I can refer you to a urologist.  Kegel exercises printed.  UA today is negative.    Birth control if wishing.  If you are still breast feeding, we will do a progesterone only pill.     Seeing a counselor.     Consult given to [facility] for a full body skin check.      Patient Education     Kegel Exercises  Kegel exercises dont need special clothing or equipment. Theyre easy to learn and simple to do. And if you do them right, no one can tell youre doing them, so they can be done almost anywhere. Your healthcare provider, nurse, or physical therapist can answer any questions you have and help you get started.    A weak pelvic floor  The pelvic floor muscles may weaken due to aging, pregnancy and vaginal childbirth, injury, surgery, chronic cough, or lack of exercise. If the pelvic floor is weak, your bladder and other pelvic organs may sag out of place. The urethra may also open too easily and allow urine to leak out. Kegel exercises can help you strengthen your pelvic floor muscles. Then they can better support the pelvic organs and control urine flow.  How Kegel exercises are done  Try each of the Kegel exercises described below. When youre doing them, try not to move your leg, buttock, or stomach muscles:    Contract as if you were stopping your urine stream. But do it when youre not urinating.    Tighten your rectum as if trying not to pass gas. Contract your anus, but dont move your buttocks.    You may place a finger or  2 in the vagina and squeeze your finger with your vagina to learn which muscles to tighten.  Try to hold each Kegel for a slow count to 5. You probably wont be able to hold them for that long at first. But keep practicing. It will get easier as your pelvic floor gets stronger. Eventually, special weights that you place in your vagina may be recommended to help make your Kegels even more effective. Visit your healthcare provider if you have difficulties doing Kegel exercises.  Helpful hints  Here are some tips to follow:    Do your Kegels as often as you can. The more you do them, the faster youll feel the results.    Pick an activity you do often as a reminder. For instance, do your Kegels every time you sit down.    Tighten your pelvic floor before you sneeze, get up from a chair, cough, laugh, or lift. This protects your pelvic floor from injury and can help prevent urine leakage.   Date Last Reviewed: 10/1/2017    4126-8275 [employer]. [address] [place] [address], [place] [address]. All rights reserved. This information is not intended as a substitute for professional medical care. Always follow your healthcare professional's instructions.

## 2021-06-17 NOTE — TELEPHONE ENCOUNTER
Telephone Encounter by Latoya Keller CMA at 8/25/2020  2:41 PM     Author: Latoya Keller CMA Service: -- Author Type: Certified Medical Assistant    Filed: 8/25/2020  2:44 PM Encounter Date: 8/23/2020 Status: Signed    : Latoya Keller CMA (Certified Medical Assistant)       Medication: ADDERALL     Last Date Filled 7/10/2020     pulled: YES      Only PCP Prescribing?: YES    Taken as prescribed from physician notes?: YES     Plan:   Restarting Aderall 10 mg in the morning. Can increase to twice a day if needed, last dose not after noon. Do not start until done breastfeeding.  Did controlled substance agreement for Aderall and urine Tox.  Can stop breastfeeding when you wish and the milk will dry up.   Set a physical in 6 months and will do a med check then.    CSA in last year: YES 2/28/2020    Random Utox in last year: YES 2/26/2020    Opioids + benzodiazepines? NO

## 2021-06-17 NOTE — TELEPHONE ENCOUNTER
Patient has appt scheduled on     Medication:   dextroamphetamine-amphetamine (ADDERALL) 10 mg Tab tablet 60 tablet         Last Date Filled Per epic, 21     pulled: NO    Only PCP Prescribing?: unknown    Taken as prescribed from physician notes?: YES  Plan:   Restarting Aderall 10 mg in the morning. Can increase to twice a day if needed, last dose not after noon. Do not start until done breastfeeding.  Did controlled substance agreement for Aderall and urine Tox.    CSA in last year: NO-      Random Utox in last year: no-      Opioids + benzodiazepines? NO

## 2021-06-17 NOTE — TELEPHONE ENCOUNTER
Controlled Substance Refill Request  Medication Name:   Requested Prescriptions     Pending Prescriptions Disp Refills     dextroamphetamine-amphetamine (ADDERALL) 10 mg Tab tablet 60 tablet 0     Sig: Take 1 tablet by mouth 2 (two) times a day.     Date Last Fill: 3/29/21  Requested Pharmacy: [facility]  Submit electronically to pharmacy  Controlled Substance Agreement on file:   Encounter-Level CSA Scan Date:    There are no encounter-level csa scan date.        Last office visit:  8/27/20

## 2021-06-17 NOTE — TELEPHONE ENCOUNTER
Telephone Encounter by Valeria Rahman CMA at 10/6/2020  7:52 AM     Author: Valeria Rahman CMA Service: -- Author Type: Certified Medical Assistant    Filed: 10/6/2020  7:55 AM Encounter Date: 10/1/2020 Status: Signed    : Valeria Rahman CMA (Certified Medical Assistant)       Medication: Adderall    Last Date Filled 8/25/2020     pulled: YES  ( Insert snip-it from last 2 months of )     Only PCP Prescribing?: YES    Taken as prescribed from physician notes?: YES  ( Insert text from last clinic note assessing medication)     CSA in last year: YES 2/26/2020    Random Utox in last year: YES 2/26/2020    Opioids + benzodiazepines? NO

## 2021-06-17 NOTE — PROGRESS NOTES
Assessment & Plan     Tracey was seen today for medication management.    Diagnoses and all orders for this visit:    Bilateral carpal tunnel syndrome  -     Ambulatory referral to Orthopedics    Gallstones    Episode of recurrent major depressive disorder, unspecified depression episode severity (H)    Attention deficit disorder (ADD) without hyperactivity  -     Drug Abuse 1+, Urine    SOB (shortness of breath)  -     albuterol (PROAIR HFA;PROVENTIL HFA;VENTOLIN HFA) 90 mcg/actuation inhaler; Inhale 2 puffs every 6 (six) hours as needed for wheezing.    Jaw pain  -     Ambulatory referral to [facility]    Anxiety    Controlled substance agreement broken-Adderall 10 mg bid, 60 per month, urine tox and CSA 21    Referring to a hand surgeon at [facility] for bilateral carpal tunnel.  [facility]  PHONE: [phone]    CT scan showed gallstones.  If you have abdominal pain in your right upper abdomen after eating please let me know and we would have you see a surgeon.    It is possible your lower pelvic pain is from the  scar.  Recommend some scar massage.  We can refer to PT if needed certainly if it is getting worse I want to know about that.    Referral given to oral surgeon to be assessed for TMJ pain.  [facility]  PHONE: [phone]    Albuterol inhaler as needed can use 2 puffs before exercise.  I will send a refill.    Today did a urine tox and controlled substance agreement for Adderall 10 mg twice a day max of 60 per 30 days.    I would recommend reestablishing care with a therapist please.    Set physical in 6 months.           38 minutes spent on the date of the encounter doing chart review, history and exam, documentation and further activities per the note       Return in about 6 months (around 2021) for Annual physical.    Alecia Esteban MD  [facility]    Subjective   Tracey Crawford is 31 y.o. and presents today for the  following health issues   HPI   Chief Complaint   Patient presents with     Medication Management     depression/ ADHD med check CSA/ UDS 20       Carpal tunnel:  Bilateral, right > left. In pregnancy 2 years ago. Had cortisone in right wrist and helped.  Tips of right 3rd and 4th finger are constantly numb.    Depression:  Wellbutrin started in Aug 2020.  Tried for 2 months and felt jittery and not helping.  Went off.  She and spouse feels like she is a bit short with reacting to things.  Been on Citalopram for 5 or more years.  Had been seeing a therapist regularly.  Saw psychiatrist very rarely for med checks.  2 years since last mental health visit.  She agrees that counseling would be beneficial for her again.    ADHD:  Med is helping.  No side effects like chest pain, palpitations, insomnia, anorexia..  Will have sound sensitivity that will bother her.  Also some touch sensitivity.    Pelvic pain:  For about a year, feeling lower pelvic pain.  Had similar pain in pregnancy.  Had  4 years ago, with first pregnancy.   Wonders if pain form the scar.  Had been numb from scar, now feeling back over the scar.  Abdominal pelvic CT and pelvic ultrasound was normal.    Jaw locking:  Wonders about TMJ.  Locking and clicking, mostly on left.    Working out and doing cardiokickboxing, occasional feels shortness of breath. Wonder if she can use her inhaler.                Objective    /68 (Patient Site: Left Arm, Patient Position: Sitting, Cuff Size: Adult Regular)   Pulse 82   Temp 98  F (36.7  C) (Oral)   Resp 16   Wt 158 lb 8 oz (71.9 kg)   LMP 04/15/2021   BMI 27.21 kg/m    Body mass index is 27.21 kg/m .  Physical Exam  Gen: No apparent distress

## 2021-06-18 NOTE — LETTER
Letter by Alecia Esteban MD at      Author: Alecia Esteban MD Service: -- Author Type: --    Filed:  Encounter Date: 2/11/2019 Status: (Other)       Tracey Crawford  [address] [place] [place] [address]      February 11, 2019      Dear Ms. Crawford,     At [facility], we care about your health and well-being. Your primary care provider is committed to ensuring you receive high quality care and has chosen a network of specialists to assist in providing that care. Recently Dr. Esteban referred you to [facility] for specialty care.        It is important to your overall health to follow through with the recommendation from your provider. Please call [phone] at your earliest convenience to schedule an appointment.  If you have already scheduled this appointment, please disregard this notice.        Sincerely,        Electronically signed by Alecia Esteban MD

## 2021-06-20 NOTE — LETTER
Letter by Alecia Esteban MD at      Author: Alecia Esteban MD Service: -- Author Type: --    Filed:  Encounter Date: 2/26/2020 Status: (Other)         [facility] MEDICINE/OB  02/26/20    Patient: Tracey Crawford  YOB: 1990  Medical Record Number: [id number]  CSN: [id number]                                                                              Non-opioid Controlled Substance Agreement    I understand that my care provider has prescribed a controlled substance to help manage my condition(s). I am taking this medicine to help me function or work. I know this is strong medicine, and that it can cause serious side effects. Controlled substances can be sedating, addicting and may cause a dependency on the drug. They can affect my ability to drive or think, and cause depression. They need to be taken exactly as prescribed. Combining controlled substances with certain medicines or chemicals (such as cocaine, sedatives and tranquilizers, sleeping pills, meth) can be dangerous or even fatal. Also, if I stop controlled substances suddenly, I may have severe withdrawal symptoms.  If not helpful, I may be asked to stop them.    The risks, benefits, and side effects of these medicine(s) were explained to me. I agree that:    1. I will take part in other treatments as advised by my care team. This may be psychiatry or counseling, physical therapy, behavioral therapy, group treatment or a referral to a pain clinic. I will reduce or stop my medicine when my care team tells me to do so.  2. I will take my medicines as prescribed. I will not change the dose or schedule unless my care team tells me to. There will be no refills if I run out early.  I may be contactedwithout warning and asked to complete a urine drug test or pill count at any time.   3. I will keep all my appointments, and understand this is part of the monitoring of controlled substances. My care team may require an office visit for  EVERY controlled substance refill. If I miss appointments or dont follow instructions, my care team may stop my medicine.  4. I will not ask other providers to prescribe controlled substances, and I will not accept controlled substances from other people. If I need another prescribed controlled substance for a new reason, I will tell my care team within 1 business day.  5. I will use one pharmacy to fill all of my controlled substance prescriptions, and it is up to me to make sure that I do not run out of my medicines on weekends or holidays. If my care team is willing to refill my controlled substance prescription without a visit, I must request refills only during office hours, refills may take up to 3 days to process, and it may take up to 5 to 7 days for my medicine to be mailed and ready at my pharmacy. Prescriptions will not be mailed anywhere except my pharmacy.    6. I am responsible for my prescriptions. If the medicine/prescription is lost or stolen, it will not be replaced. I also agree not to share controlled substance medicines with anyone.          [facility] FAMILY MEDICINE/OB  02/26/20  Patient:  Tracey Crawford  YOB: 1990  Medical Record Number: [id number]  CSN: [id number]    7. I agree to not use ANY illegal or recreational drugs. This includes marijuana, cocaine, bath salts or other drugs. I agree not to use alcohol unless my care team says I may. I agree to give urine samples whenever asked. If I dont give a urine sample, the care team may stop my medicine.    8. If I enroll in the [place] Medical Marijuana program, I will tell my care team. I will also sign an agreement to share my medical records with my care team.    9. I will bring in my list of medicines (or my medicine bottles) each time I come to the clinic.   10. I will tell my care team right away if I become pregnant or have a new medical problem treated outside of my regular clinic.  11. I understand that this medicine  can affect my thinking and judgment. It may be unsafe for me to drive, use machinery and do dangerous tasks. I will not do any of these things until I know how the medicine affects me. If my dose changes, I will wait to see how it affects me. I will contact my care team if I have concerns about medicine side effects.    I understand that if I do not follow any of the conditions above, my prescriptions or treatment may be stopped.      I agree that my provider, clinic care team, and pharmacy may work with any city, state or federal law enforcement agency that investigates the misuse, sale, or other diversion of my controlled medicine. I will allow my provider to discuss my care with or share a copy of this agreement with any other treating provider, pharmacy or emergency room where I receive care. I agree to give up (waive) any right of privacy or confidentiality with respect to these consents.   I have read this agreement and have asked questions about anything I did not understand.    ___________________________________________________________________________  Patient signature - Date/Time  -Tracey Crawford                                      ___________________________________________________________________________  Witness signature                                                                    ___________________________________________________________________________  Provider signature- Alecia Esteban MD

## 2021-06-20 NOTE — PROGRESS NOTES
Assessment:   Pregnancy confirmation  1. Missed menses  Pregnancy (Beta-hCG, Qual), Urine   2. Spotting affecting pregnancy  US OB < 14 Weeks With Transvaginal    Parvovirus B19 Antibodies, IgG and IgM       Plan:    Counseled patient on early pregnancy issues and plans for continued pregnancy:  - OTC meds safe in early pregnancy,  - importance of prenatal vitamins, and routine activities without restrictions.    - importance of ETOH abstinence and no other drug use  Informed of signs and symptoms of miscarriage and to call with questions of spotting/bleeding management and possible need to come in for evaluation before routine visit at 10-12 weeks.   Set 30 minute visit at 12 weeks pregnant.  Discussed information in the OB packet.  Will let us know if early genetic testing wanted.  Can call for script of Phenergan or Reglan if needed for nausea.   Ultrasound ordered. Will ask specilty  to try to set this at same time at [facility] ultrasound.  40 min 1st OB apt in 2 weeks, then set apt 4 weeks after.  Parvovirus labs today.  On prenatal vitamin.  Off Adderall.  Per psychiatrist ok to be on Citalopram.  Twenty-five minutes spent with this patient, at least 50% in coordination of care or counseling reguarding the topics discussed in note.      Chief Complaint   Patient presents with     Amenorrhea     lmp 07/27/18- positive upt at home        HPI: 28 y.o. year old female come in  today for a pregnancy confirmation. LMP was July 27. This would make her about 10-1/2 weeks pregnant. Not having any spotting. She is not having nausea.  She did have some spotting at about 4-6 weeks pregnant after intercourse but none since.  She has had chickenpox.  She has had some cramping on her right side.  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  No other questions.    Objective:  /64 (Patient Site: Left Arm, Patient Position: Sitting, Cuff  Size: Adult Regular)  Pulse 76  Temp 98  F (36.7  C) (Oral)   Resp 16  LMP 07/27/2018   General: No apparent distress  UPT: Positive

## 2021-06-21 ENCOUNTER — COMMUNICATION - HEALTHEAST (OUTPATIENT)
Dept: FAMILY MEDICINE | Facility: CLINIC | Age: 31
End: 2021-06-21

## 2021-06-21 DIAGNOSIS — F98.8 ATTENTION DEFICIT DISORDER (ADD) WITHOUT HYPERACTIVITY: ICD-10-CM

## 2021-06-21 NOTE — LETTER
Letter by Alecia Esteban MD at      Author: Alecia Esteban MD Service: -- Author Type: --    Filed:  Encounter Date: 5/13/2021 Status: (Other)                    My Depression Action Plan  Name: Tracey Crawford   Date of Birth 1990  Date: 5/13/2021    My Doctor: Alecia Esteban MD   My Clinic: [address] [facility] [address]  [phone]          GREEN    ZONE   Good Control    What it looks like:     Things are going generally well. You have normal ups and downs. You may even feel depressed from time to time, but bad moods usually last less than a day.   What you need to do:  1. Continue to care for yourself (see self care plan)  2. Check your depression survival kit and update it as needed  3. Follow your physicians recommendations including any medication.  4. Do not stop taking medication unless you consult with your physician first.           YELLOW         ZONE Getting Worse    What it looks like:     Depression is starting to interfere with your life.     It may be hard to get out of bed; you may be starting to isolate yourself from others.    Symptoms of depression are starting to last most all day and this has happened for several days.     You may have suicidal thoughts but they are not constant.   What you need to do:     1. Call your care team. Your response to treatment will improve if you keep your care team informed of your progress. Yellow periods are signs an adjustment may need to be made.     2. Continue your self-care.  Just get dressed and ready for the day.  Don't give yourself time to talk yourself out of it.    3. Talk to someone in your support network.    4. Open up your depression Depression Self-Care Plan / Wellness kit.           RED    ZONE Medical Alert - Get Help    What it looks like:     Depression is seriously interfering with your life.     You may experience these or other symptoms: You cant get out of bed  most days, cant work or engage in other necessary activities, you have trouble taking care of basic hygiene, or basic responsibilities, thoughts of suicide or death that will not go away, self-injurious behavior.     What you need to do:  1. Call your care team and request a same-day appointment. If they are not available (weekends or after hours) call your local crisis line, emergency room or 911.          Depression Self-Care Plan / Wellness Kit    Many people find that medication and therapy are helpful treatments for managing depression. In addition, making small changes to your everyday life can help to boost your mood and improve your wellbeing. Below are some tips for you to consider. Be sure to talk with your medical provider and/or behavioral health consultant if your symptoms are worsening or not improving.     Sleep  Sleep hygiene means all of the habits that support good, restful sleep. It includes maintaining a consistent bedtime and wake time, using your bedroom only for sleeping or sex, and keeping the bedroom dark and free of distractions like a computer, smartphone, or television.     Develop a Healthy Routine  Maintain good hygiene. Get out of bed in the morning, make your bed, brush your teeth, take a shower, and get dressed. Dont spend too much time viewing media that makes you feel stressed. Find time to relax each day.    Exercise  Get some form of exercise every day. This will help reduce pain and release endorphins, the feel good chemicals in your brain. It can be as simple as just going for a walk or doing some gardening, anything that will get you moving.      Diet  Strive to eat healthy foods, including fruits and vegetables. Drink plenty of water. Avoid excessive sugar, caffeine, alcohol, and other mood-altering substances.     Stay Connected with Others  Stay in touch with friends and family members.    Manage Your Mood  Try deep breathing, massage therapy, biofeedback, or meditation.  Take part in fun activities when you can. Try to find something to smile about each day.     Psychotherapy  Be open to working with a therapist if your provider recommends it.     Medication  Be sure to take your medication as prescribed. Most anti-depressants need to be taken every day. It usually takes several weeks for medications to work. Not all medicines work for all people. It is important to follow-up with your provider to make sure you have a treatment plan that is working for you. Do not stop your medication abruptly without first discussing it with your provider.    Crisis Resources   These hotlines are for both adults and children. They and are open 24 hours a day, 7 days a week unless noted otherwise.      National Suicide Prevention Lifeline   9-348-447-TALK (1719)      Crisis Text Line    www.crisistextline.org  Text HOME to 520258 from anywhere in the [place], anytime, about any type of crisis. A live, trained crisis counselor will receive the text and respond quickly.      [place] Lifeline for LGBTQ Youth  A national crisis intervention and suicide lifeline for LGBTQ youth under 25. Provides a safe place to talk without judgement. Call 1-896.205.9901; text START to 422967 or visit www.themilabentvorproject.org to talk to a trained counselor.      For [facility] crisis numbers, visit the [facility] website at:  https://[place].gov/dhs/people-we-serve/adults/health-care/mental-health/resources/crisis-contacts.jsp

## 2021-06-21 NOTE — LETTER
Letter by Alecia Esteban MD at      Author: Alecai Esteban MD Service: -- Author Type: --    Filed:  Encounter Date: 5/13/2021 Status: (Other)       Non-Opioid Controlled Substance Agreement    This is an agreement between you and your provider regarding safe and appropriate controlled substance prescribing.? Controlled substances are?medicines that can cause physical and mental dependence. The manufacturing, possession and use of these medicines are regulated by law.  We here at [facility] [place] are making a commitment to work with you in your efforts to get better.? To support you in this work, we will help you schedule regular appointments for medicine refills. If we must cancel or change your appointment for any reason, we will make sure you have enough medication to last until your next appointment.      As a Provider, I will:     Listen carefully to your concerns while treating you with dignity.     Recommend a treatment plan that I believe is in your best interest and may involve therapies other than medication.      Review the chance of benefit and the chance of harm of this medicine with you regularly and evaluate the safety and effectiveness of this therapy.       Provide a plan on how to discontinue if the decision is made to stop this medicine.       As a Patient, I understand controlled substances:       Are prescribed by my care provider to help me function or work and manage my condition(s).?    Are strong medicines and can cause serious side effects.      Need to be taken exactly as prescribed.?Combining controlled substances with certain medicines or chemicals (such as illegal drugs, alcohol, sedatives, sleeping pills, and benzodiazepines) can be dangerous or even fatal.? If I stop taking my medicines suddenly, I may have severe withdrawal symptoms.     The risks, benefits, and side effects of these medicine(s) were explained to me. I agree that:    1. I will take part in other  treatments as advised by my care team. This may be psychiatry or counseling, physical therapy, behavioral therapy, group treatment or a referral to specialist.    2. I will keep all my appointments and understand this is part of the monitoring of controlled substance.?My care team may require an office visit for EVERY controlled substance refill. If I miss appointments or dont follow instructions, my care team may stop my medicine    3. I will take my medicines as prescribed. I will not change the dose or schedule unless my care team tells me to. There will be no refills if I run out early.      4. I may be contactedwithout warning and asked to complete a urine drug test or pill count at any time. If I dont give a urine sample or participate in a pill count, the care team may stop my medicine.    5. I will only receive controlled substance prescriptions from this clinic. If treated by another provider, I will let them know I am taking controlled substances and that I have a treatment agreement with this provider. I will inform this care team within one business day if I am given a prescription for any controlled substance by another healthcare provider. This care team may contact other providers and pharmacists about my use of the medicines.     6. It is up to me to make sure that I do not run out of my medicines on weekends or holidays.?If my care team is willing to refill my prescription without a visit, I must request refills only during office hours. Refills may take up to 3 business days to process.  I will use one pharmacy to fill all my controlled substance prescriptions.  I will notify the clinic if any changes are made due to insurance changes or medication availability.    7. I am responsible for my prescriptions. If the medicine/prescription is lost, stolen or destroyed, it will not be replaced.?I also agree not to share controlled substance medicines with anyone.     8. I am aware I should not use any  illegal or recreational drugs. I agree not to drink alcohol unless my care team says I can.     9. If I enroll in the [place] Medical Cannabis program, I will tell my care team.?    10. I will tell my care team right away if I become pregnant, have a new medical problem treated outside of my regular clinic, or have a change in my medications.     11. I understand that this medicine can affect my thinking, judgment and reaction time.? Alcohol and drugs affect the brain and body, which can affect the safety of a persons driving. Being under the influence of alcohol or drugs can affect a persons decision-making, behaviors, personal safety, and the safety of others. Driving while impaired (DWI) can occur if a person is driving, operating, or in physical control of a car, motorcycle, boat, snowmobile, ATV, motorbike, off-road vehicle, or any other motor vehicle ([place] Statute 169[place].20). I understand the risk if I choose to drive or operate machinery  I understand that if I do not follow any of the conditions above, my prescriptions or treatment may be stopped or changed.     I agree that my provider, clinic care team, and pharmacy may work with any city, state or federal law enforcement agency that investigates the misuse, sale, or other diversion of my controlled medicine. I will allow my provider to discuss my care with or share a copy of this agreement with any other treating provider, pharmacy or emergency room where I receive care.?    I have read this agreement and have asked questions about anything I did not understand.    ________________________________________________________  Patient Signature - Tracey Crawford     ___________________                   Date     ________________________________________________________  Provider Signature - Alecia Esteban MD       ___________________                   Date     ________________________________________________________  Witness Signature (required if provider not  present while patient signing)          ___________________                   Date

## 2021-06-21 NOTE — PROGRESS NOTES
PRENATAL VISIT   FIRST OBSTETRICAL EXAM - OB    Assessment / Impression     1. Pregnancy, incidental  ABO/RH Typing (OP order)    Hepatitis B Surface antigen (HBsAG)    HIV Antigen/Antibody Screening Cascade    HML Antibody Screen    RPR    Rubella Immune Status (IgG)    Urinalysis Macroscopic    Culture, Urine    Wet Prep, Vaginal    Chlamydia trachomatis & Neisseria gonorrhoeae, Amplified Detection     Normal first prenatal visit at 18 2/7  Discussed orientation, general information, lifestyle, nutrition, exercise,warning signs, resources, lab testing, risk screening and discussed cystic fibrosis screening with patient.  Questions answered.    Plan:      Initial labs drawn.  Prenatal vitamins.  Problem list reviewed and updated.  Genetic screening test options discussed:  Patient elects To think abut Quad screen  Role of ultrasound in pregnancy discussed; fetal survey: ordered.  Follow up: 4 weeks  Ultrasound for fetal survey next week.    Follow-up in 4 weeks.    Discussed QUAD screen (gentetic testing) , can do before 22 weeks if wishes. Blood test.    For red bumps, can use 1% Hydrocortisone if needed and or Claritin 10 mg daily if needed.    Declined flu shot.    Labs done.    Pap postpartum.    If concerns of yeast infection, can use Monistat cream external 2 times a day as needed. If internal symptoms, can use 3 or 7 day Monistat.    Rhogam shot at 28 weeks.    Tdap at 32-34 weeks.    Subjective:    Tracey Crawford is a 28 y.o.  here today for her First Obstetrical Exam.  She has been seen 2 times already for this pregnancy.  Patient is overall doing well.  Her parvovirus antibody screen sure she had past infection.  She is not having any further spotting.  Having some nausea and occasional vomiting in the morning.  Ultrasound November 2 showed a 14-6/7-week baby with a due date of 4/27/2019.  Mentions some red itchy bumps on her arms off and on.  He had had some right lower quadrant pain but that improved.   On ultrasound the did show a corpus luteal cyst.  She occasionally has some itching or yeast infection symptoms on the vulva.  No internal vaginal symptoms.    OB History    Para Term  AB Living   3 1 1 0 1 1   SAB TAB Ectopic Multiple Live Births   0 0 0 0 1      # Outcome Date GA Lbr Dennis/2nd Weight Sex Delivery Anes PTL Lv   3 Current            2 Term 17 39w2d 15:57 / 02:11 7 lb 5 oz (3.317 kg) F CS-LTranv EPI, Spinal N EVA   1 AB                   Expected Date of Delivery: Not found.    Past Medical History:   Diagnosis Date     Joint Stiffness Finger(S)     right 5th finger     Past Surgical History:   Procedure Laterality Date      SECTION N/A 2017    Procedure:  SECTION;  Surgeon: Ilir Dimas MD;  Location: [facility]D OR;  Service:      Social History     Tobacco Use     Smoking status: Never Smoker     Smokeless tobacco: Never Used   Substance Use Topics     Alcohol use: No     Drug use: No     Current Outpatient Medications   Medication Sig Dispense Refill     citalopram (CELEXA) 40 MG tablet TAKE 1 TABLET BY MOUTH DAILY 90 tablet 2     prenatal vitamin iron-folic acid 27mg-0.8mg (PRENATAL S) 27 mg iron- 800 mcg Tab tablet Take 1 tablet by mouth daily.       triamcinolone (KENALOG) 0.5 % cream APPLY EXTERNALLY TO THE AFFECTED AREA 2 TO 3 TIMES DAILY 60 g 3     No current facility-administered medications for this visit.      No Known Allergies          High Risk Behavior: None    Review of Systems  General:  Denies problem  Eyes: Denies problem  Ears/Nose/Throat: Denies problem  Cardiovascular: Denies problem  Respiratory:  Denies problem  Gastrointestinal:  Denies problem, Genitourinary: Denies problem  Musculoskeletal:  Denies problem  Skin: Denies problem  Neurologic: Denies problem  Psychiatric: Denies problem  Endocrine: Denies problem  Heme/Lymphatic: Denies problem   Allergic/Immunologic: Denies problem       Objective:   Objective    Vitals:     "11/26/18 1126   BP: 111/61   Pulse: 78   Temp: 98  F (36.7  C)   TempSrc: Oral   SpO2: 100%   Weight: 156 lb 6.4 oz (70.9 kg)   Height: 5' 4\" (1.626 m)     Physical Exam:  General Appearance: Alert, cooperative, no distress, appears stated age  Head: Normocephalic, without obvious abnormality, atraumatic  Eyes: PERRL, conjunctiva/corneas clear, EOM's intact  Ears: Normal TM's and external ear canals, both ears  Nose: Nares normal, septum midline,mucosa normal, no drainage  Throat: Lips, mucosa, and tongue normal; teeth and gums normal  Neck: Supple, symmetrical, trachea midline, no adenopathy;  thyroid: not enlarged, symmetric, no tenderness/mass/nodules; no carotid bruit or JVD  Back: Symmetric, no curvature, ROM normal, no CVA tenderness  Lungs: Clear to auscultation bilaterally, respirations unlabored  Breasts: No breast masses, tenderness, asymmetry, or nipple discharge.  Heart: Regular rate and rhythm, S1 and S2 normal, no murmur, rub, or gallop, Abdomen: Soft, non-tender, bowel sounds active all four quadrants,  no masses, no organomegaly  Pelvic:Normally developed genitalia with no external lesions or eruptions. Vagina and cervix show no lesions, inflammation, discharge or tenderness. No cystocele, No rectocele. Uterus 18.  No adnexal mass or tenderness.    Extremities: Extremities normal, atraumatic, no cyanosis or edema  Skin: Skin color, texture, turgor normal, no rashes or lesions  Lymph nodes: Cervical, supraclavicular, and axillary nodes normal  Neurologic: Normal     Lab:   Results for orders placed or performed in visit on 11/26/18   Culture, Urine   Result Value Ref Range    Culture No Growth    Wet Prep, Vaginal   Result Value Ref Range    Yeast Result No yeast seen No yeast seen    Trichomonas No Trichomonas seen No Trichomonas seen    Clue Cells, Wet Prep No Clue cells seen No Clue cells seen   Chlamydia trachomatis & Neisseria gonorrhoeae, Amplified Detection   Result Value Ref Range    Chlamydia " trachomatis, Amplified Detection Negative Negative    Neisseria gonorrhoeae, Amplified Detection Negative Negative   ABO/RH Typing (OP order)   Result Value Ref Range    HML ABO/Rh Typing O NEG     HML ABO/Rh Repeat Typing O NEG    Hepatitis B Surface antigen (HBsAG)   Result Value Ref Range    Hepatitis B Surface Ag Negative Negative   HIV Antigen/Antibody Screening Cascade   Result Value Ref Range    HIV Antigen / Antibody Negative Negative   HML Antibody Screen   Result Value Ref Range    HML Antibody Screen Negative Negative   RPR   Result Value Ref Range    Treponema Antibody (Syphilis) Negative Negative   Rubella Immune Status (IgG)   Result Value Ref Range    Rubella Antibody, IgG Positive    Urinalysis Macroscopic   Result Value Ref Range    Color, UA Yellow Colorless, Yellow, Straw, Light Yellow    Clarity, UA Clear Clear    Glucose, UA Negative Negative    Bilirubin, UA Negative Negative    Ketones, UA Negative Negative    Specific Gravity, UA 1.025 1.005 - 1.030    Blood, UA Negative Negative    pH, UA 6.5 5.0 - 8.0    Protein, UA Negative Negative mg/dL    Urobilinogen, UA 0.2 E.U./dL 0.2 E.U./dL, 1.0 E.U./dL    Nitrite, UA Negative Negative    Leukocytes, UA Negative Negative

## 2021-06-21 NOTE — PROGRESS NOTES
Assessment & Plan:  1. 12 weeks gestation of pregnancy  Patient and early stages of pregnancy.  Discussed how her symptoms may be related to pregnancy versus dehydration versus possible viral illness.  Plan to evaluate further with a hemogram to ensure no infection.  Also will check a thyroid to ensure no history of thyroid disease.  She was reassured when she heard normal heart tones.    2. Lightheadedness  See above  - HM1(CBC and Differential)  - Thyroid Cascade  - HM1 (CBC with Diff)        Orders Placed This Encounter   Procedures     Thyroid Cascade     HM1 (CBC with Diff)     There are no discontinued medications.    No Follow-up on file.          This note was created use Dragon Dictation.  There may be sound alike errors present.       Chief Complaint:   Chief Complaint   Patient presents with     Nausea     Patient felt off, patient has been dizzy, lightheaded, increaed in headaches. x 4 days     GI Problem     Patient is burping more and it makes her nausea. x 4 days     Fatigue     Patient feels more fatigued.      Abdominal Pain     pain in the lower right side, coughing or sneezing hurts.       History of Present Illness:  Tracey is a 28 y.o. female presenting to the clinic today she states she has been feeling dizzy and lightheaded for about 4 days.  Has had some nausea and some GI upset for about 4 days.  Patient also states with certain movements or coughing she will have some pain in the right lower quadrant.  Patient states that she was in a wedding over the weekend and may not have drank as much.  Patient states last night she is sleeping better she denies any fever but did have some chills.  No congestion or sore throat..     Review of Systems:  All other systems are negative.     PFSH:  Patient is pregnant    Tobacco Use:  History   Smoking Status     Never Smoker   Smokeless Tobacco     Never Used       Vitals:  Vitals:    10/24/18 1120   BP: 110/68   Patient Site: Left Arm   Patient  Position: Sitting   Cuff Size: Adult Regular   Pulse: 85   Resp: 18   Temp: 98.4  F (36.9  C)   TempSrc: Oral   Weight: 152 lb 6 oz (69.1 kg)     Wt Readings from Last 3 Encounters:   10/24/18 152 lb 6 oz (69.1 kg)   04/12/18 149 lb 2 oz (67.6 kg)   06/07/17 154 lb 8 oz (70.1 kg)     Body mass index is 26.16 kg/(m^2).      Physical Exam:  Constitutional:  Reveals an alert, cooperative,  female in no acute distress.  Vitals:  Per nursing notes.  Ears:  Clear.  Oropharynx:  Without posterior nasal drainage or thrush.  Neck:  Supple, no lymphadenopathy,  thyroid not palpable.  Cardiac:  Regular rate and rhythm without murmurs, rubs, or gallops.    Lungs: Clear.  Respiratory effort normal.  Abdomen:  non-tender, non-distended.  Neither liver nor spleen palpable.  Skin:   Without rash, bruise, or palpable lesions.  Psychiatric:  Mood appropriate, memory intact.   FHT: Fetal heart tones were heard today at 155    Data Reviewed:  Additional history summarized (from old records or history from someone other than the patient or another healthcare provider) (2 TOTAL): 2 view pregnancy confirmation note.     Decision to obtain extra information (old records requested or history from another person or accessing Care Everywhere) (1 TOTAL): None.     Radiology tests summarized or ordered (XRAY/CT/MRI/DXA) (1 TOTAL): 1 reviewed ultrasound.    Labs reviewed or ordered (1 TOTAL): 1 ordered labs.    Medicine tests summarized or ordered (EKG/ECHO) (1 TOTAL): None.    Independent review of EKG or X-Ray (2 EACH): None.       The visit lasted a total of 26 minutes face to face with the patient. Over 50% of the time was spent counseling and educating the patient about pregnancy.        Medications:  Current Outpatient Prescriptions   Medication Sig Dispense Refill     citalopram (CELEXA) 40 MG tablet TAKE 1 TABLET BY MOUTH DAILY 90 tablet 2     prenatal vitamin iron-folic acid 27mg-0.8mg (PRENATAL S) 27 mg iron- 800 mcg Tab tablet Take 1  tablet by mouth daily.       triamcinolone (KENALOG) 0.5 % cream APPLY EXTERNALLY TO THE AFFECTED AREA 2 TO 3 TIMES DAILY 60 g 3     No current facility-administered medications for this visit.        Total Data Points: 4

## 2021-06-22 NOTE — PROGRESS NOTES
S: Normal 20-week ultrasound.  They could not how the gender of the baby.  Patient is overall doing well but she did have stomach flu a few days ago.  She had one day of vomiting and that has resolved.  First OB labs are all normal.  She has had no vaginal bleeding.  No sharp abdominal pain but does continue to have some right lower quadrant pressure.  And has lessened.  Her itchy bumps have resolved.  She does have a family history of some possible genetic abnormalities.  Her cousin had a baby with multiple birth defects or possible birth trauma.  Another cousin had a baby with autism.  She is agreeable to have the cell free DNA test.    O: Deep tendon reflexes 2+    A/P: 21-4/7 weeks pregnant doing well.  Repeat ultrasound if the right lower abdominal pain worsens or any bleeding.  Cell free DNA test with sex chromosomes and will put gender in envelope and mail.  Rhogam shot at 28 weeks.  Tdap at 32-34 weeks.  Follow-up in 4 weeks then 3 weeks, so visit at 28 weeks. Then every 2 weeks til 36 and weekly through 41 weeks.

## 2021-06-23 NOTE — PROGRESS NOTES
S: She is overall doing okay but has had a stressful day.  After shoveling yesterday her  throughout his back.  He is in a lot of pain today.  She does do home  and needed backup to come to the appointment.  She did have her mom come and help.  1 of the toddler's smeared feces over the plate kitchen set today and she had to clean that up.  She is feeling good fetal movement.  Still having some pain just to the right and lower of the bellybutton.  That has been present for many weeks.  Is not gotten any worse and has maybe gotten a little bit better.  No bleeding.  For the last day or so she has had a little bit of discomfort at the bellybutton with pushing on it.  She has very occasional contractions nothing regular.  She is due for the RhoGam shot.  She passed her 1 hour glucose challenge at 70, hemoglobin 11.8- syphilis.  No other concerns.    O: Deep tendon reflexes 2+    A/P: 28-4/7 weeks gestation, doing well.  Rhogam shot today.  Tdap shot next visit or 32 or 34 weeks.  Follow-up in 2 weeks.  Urine starting next visit.  If 3 or more squeezes (contractions) in and hour, please be evaluated and Labor and delivery).  OBGYN consult to pretty vaginal delivery after .  If right abdominal discomfort or belly button discomfort worsens or any bleeding, we will get an ultrasound.  Declined flu shot.   form done.

## 2021-06-23 NOTE — PROGRESS NOTES
S:  Normal 20 week US. Normal cell free DNA. Having a girl!  Still having some right lower quadrant pain with movement.  With resting it does resolve.  Sometimes when she pushes on her right lower abdomen it will hurt.  She has not had a fever.  No vomiting.  No vaginal bleeding.  Today is not hurting at all.  She has a cough that is resolving.  With coughing she was having right lower quadrant abdominal pain.  Also her young child climbed on her abdomen and she was having some pain over her  scar.  Patient would like to have a vaginal delivery if possible.  No other questions today.    O:  DTR 2+, No edema of ankles, trace of hands    A/P: 25-4/7 weeks gestation doing well.  1 hour glucose, Hgb and Syphilis today.  Follow-up in 3 weeks.  Rhogam shot then.  Then visits every 2 weeks until 36, then weekly. Set a 41 and 42 week the we can change to baby.  Repeat ultrasound if the right lower abdominal pain worsens or any bleeding.  Tdap at 32-34 weeks.  OBGYN consult to pretty vaginal delivery after .

## 2021-06-23 NOTE — PATIENT INSTRUCTIONS - HE
1 hour glucose, Hgb and Syphilis today.    Follow-up in 3 weeks.    Rhogam shot then.    Then visits every 2 weeks until 36, then weekly. Set a 41 and 42 week the we can change to baby.    Repeat ultrasound if the right lower abdominal pain worsens or any bleeding.    Tdap at 32-34 weeks.    OBGYN consult to pretty vaginal delivery after .    Patient Education   HEALTHY PREGNANCY CARE: 22-26 WEEKS PREGNANT    You are finishing your second trimester. Your baby is developing rapidly. At this stage, babies have a sense of balance, can respond to touch, and are recognizing parent voices.  Your baby will be moving around more now.  You may notice [place] contractions now, which are painless and prepare the uterus for the delivery.    Nutrition: During this time, you may find that your nausea and fatigue are gone. Overall, you may feel better and have more energy than you did in your first trimester. Be sure you are getting enough calcium and iron in your diet. Your prenatal vitamins cannot supply all of the nutrients you need, so continue to eat 3-4 servings of dairy foods and 2-3 servings of meat/fish/poultry/nuts every day. Foods high in iron include: red meats, eggs, dark green vegetables, dark yellow vegetables, nuts, kidney beans and chickpeas. Some cereals are fortified with iron, so look at the food labels for 100% of the daily requirement for iron.     Discuss your work situation with your midwife or physician as needed. If you stand for long periods of time, you may need to make changes and take breaks.    [place] for childbirth and parenting classes, including an infant CPR class. Breastfeeding classes are recommended too.    Plan for the gestational diabetes screening between weeks 24-28. You can eat normally before that visit; we would suggest making sure you have protein foods, but not a lot of carbohydrates or sugary foods.    Your blood type was determined at your first OB appointment. If  "you are Rh negative, you should discuss the need for a Rhogam shot with your midwife or physician. This would be administered around 28 weeks if necessary.    How can you care for yourself at home?   You can refer to the Starting Out Right book or find it online at http://www.[employer].org/images/stories/maternity/[facility]-Starting-Out-Right.pdf or http://www.[facility].org/images/stories/flipbooks/[facility]-starting-out-right/[facility]-starting-out-right.html#p=8     You can sign up for a weekly parenting e-mail that gives support, tips and advice from health care professionals that starts with pregnancy and continues through the toddler years. To register, go to [email or web address] at any time during your pregnancy.    Watch for the warning signs of premature labor:   \" Dull, low backache  \" Contractions of the uterus, menstrual-like cramps  \" Abdominal cramping with or without diarrhea  \" More pelvic pressure  \" Increase or change in vaginal discharge.     Continue to watch for signs and symptoms of preeclampsia:   \" Sudden swelling of your face, hands, or feet   \" New vision problems such as blurring, double vision, or flashing lights  \" A severe headache not relieved with acetaminophen (Tylenol)  \" Sharp or stabbing pain in your right or middle upper abdomen    Remember that labor doesn't have to hurt. Never hesitate to call your midwife or physician or their staff at [facility]/OB at Phone: [phone] for any one of these warning signs - or if something just doesn't feel right. If it's after clinic hours, physician patients should call the Care Connection at [phone] ([phone]); midwife patients should call their answering service at [phone].      Baby Feeding in the Hospital: Information, Support and Resources    As you prepare for the birth of your child, you will want to consider options for feeding your baby including breast-feeding and/or baby formula. The American " Academy of Pediatrics recommends exclusive breast-feeding for the first six months (although any amount of breast-feeding is beneficial).  However, we also understand that breast-feeding is a personal choice and not for everyone. Whether or not you choose to breast-feed, your decision will be respected by our staff.    There are numerous benefits of breast-feeding; here are a few to consider:    Provides antibodies to protect your baby from infections and diseases    The cost: formula can cost over $1,500 per year    Convenience, no warming up or sterilizing bottles and supplies    The physical contact with breastfeeding can make babies feel secure, warm and comforted    What ever my feeding choice, what can I expect after I deliver my baby?    Your baby will usually be placed skin-to-skin immediately following birth. The skin to skin contact between you and your baby will be a special and memorable time. The bonding and attachment comforts your baby and has a positive effect on baby s brain development.     Having your baby  room in  with you also helps you start to learn your baby s body rhythms and sleep cycle.      You will also begin to learn your baby s cues (signals) that he or she is ready to feed.    When do I start to feed my baby?  As soon as possible after your baby s birth, you will be encouraged to begin feeding.  In the first couple of weeks, your baby will eat often.  Breastfeeding babies usually eat at least 8 times in 24 hours.  Babies fed formula usually eat at least 7 times in 24 hours.      Breast-feeding tips:    Get comfortable and use pillows for support.    Have your baby at the level of your breast, facing you,  tummy to tummy .      Touch your nipple to your baby s lips so you baby s mouth opens wide (rooting reflex).  Aim the nipple toward the roof of your baby s mouth. When your baby opens his or her mouth, pull your baby toward your breast to help your baby  latch on  to your nipple and  much of the areola area.    Hand expressing your breast milk can assist with latching your baby to your breast, if needed.    Ask for help, breastfeeding may seem awkward or uncomfortable at first, this is normal. There are numerous resources available at [facility] and beyond.     If your goal is to exclusively breastfeed, avoid using any formula or artificial nipples (including bottles and pacifiers) while you are your baby are learning to breastfeed unless there is a medical reason.       Mixing breastfeeding and formula can interfere with how you begin building your milk supply.  It can impact how you and your baby  learn  to breastfeeding together and alter the natural growth of  good  bacteria in your baby s stomach.    Delay a pacifier or a bottle in the first few weeks until breastfeeding is well established. This is often around 3 weeks of age.    Ask your nurse to show you how to hand express.   Breast milk can be kept in the refrigerator or freezer for later use.    Hospital Resources:  [facility] Lactation Clinics located at [facility], [facility] and [facility]  Call: [phone].    Inpatient support    Outpatient appointments    Telephone consultation    Breast-feeding classes available through [employer]      Online Resources:    [email or web address]/baby sign up for free online weekly e-mail    [facility].[email or web address]/maternity    Breastfeedingmadesimple.com    Llli.org (La Leche League)    Normalfed.com    Womenshealth.gov/breastfeeding    Workandpump.com    Breast-feeding Supplies & Pumps:  Talk to your insurance provider or WIC (Women, Infants and Children) to learn more about options available to you. Recent health insurance changes may include additional coverage for supplies and pumps.    Public Health:  Women, Infants and Children Nutrition program (WIC): provides breast-feeding support and education in addition to formal feeding moms.  096-PZU-3236 or http://www.health.[facility].us/divs/fh/wic    Family Health Home Visiting: Public [facility] Nurse home visits are available. Talk to your provider to see if you qualify. Most [facility] have a program available.    Additional Resources:  La Leche Marcos is an international, nonprofit, nonsectarian organization offering information, education, and support to mothers who want to breast-feed their babies. Local groups offer phone help and monthly meetings. Visit [employer]pad or [employer].org and us the  Find local support  drop down menu or click on the  Resources  tab.    [place] Breastfeeding Resources: 0-278-875-BABY (9525) toll free    National Breastfeeding Help Line trained breastfeeding peer counselors can help answer common breast-feeding questions by phone. Monday-Friday: English/Pashto  [phone]- [phone] toll free, [phone] (TTY)    [facility] Connection: [phone] ([phone])       Patient Education   HEALTHY PREGNANCY CARE: 26-30 WEEKS PREGNANT    You are now in your last trimester of pregnancy. Your baby is growing rapidly, can open and close her eyelids, sometimes get hiccups, and you'll probably feel her moving around more often. Your baby has breathing movements and is gaining about one ounce each day. You may notice heartburn and leg cramps. Your back may ache as your body gets used to your baby's size and length.    Discuss your work situation with your midwife or physician as needed. If you stand for long periods of time, you may need to make changes and take breaks.    Pre-register online at the hospital where your baby will be born ([email or web address]/maternity/maternity-care-[email or web address].html)     Be aware of your baby's activity level. You may be asked to do daily fetal movement counts. Contact your midwife or physician about any decreased movement.    You may have been tested for gestational diabetes today. If you are RH negative, you may have had an additional test  and a Rhogam injection.    Consider receiving a Tdap vaccination to protect your baby from Pertussis/whooping cough.    If you are considering tubal ligation, discuss this with your midwife or physician. You will need to have an appointment with the obstetrician who will do the surgery to discuss the risks, benefits, and alternatives, and to sign the consent. This can be discussed at your next visit.    Continue to watch for any signs or symptoms of premature labor:     Regular contractions. This means having about 6 or more within 1 hour, even after you have had a glass of water and are resting.     A backache that starts and stops regularly.    An increase or change in vaginal discharge, such as heavy, mucus-like, watery, or bloody discharge.     Your water breaks or leaks.    Continue to watch for signs and symptoms of preeclampsia:     Sudden swelling of your face, hands, or feet     New vision problems such as blurring, double vision, or flashing lights    A severe headache not relieved with acetaminophen (Tylenol)    Sharp or stabbing pain in your right or middle upper abdomen    If you have any of the above symptoms or any other concerns, call your midwife or physician or their clinic staff at [facility]/OB at Phone: [phone]. If it's after clinic hours, physician patients should call the Care Connection at [phone] ([phone]); midwife patients should call their answering service at [phone].    How can you care for yourself at home?   You can refer to the Starting Out Right book or find it online at http://www.[facility].org/images/stories/maternity/[facility]-Starting-Out-Right.pdf or http://www.[facility].org/images/stories/flipbooks/[facility]-starting-out-right/[facility]-starting-out-right.html#p=8     You can sign up for a weekly parenting e-mail that gives support, tips and advice from health care professionals that starts with pregnancy and continues through the toddler  years. To register, go to [email or web address] at any time during your pregnancy.      Baby Feeding in the Hospital: Information, Support and Resources    As you prepare for the birth of your child, you will want to consider options for feeding your baby including breast-feeding and/or baby formula. The American Academy of Pediatrics recommends exclusive breast-feeding for the first six months (although any amount of breast-feeding is beneficial).  However, we also understand that breast-feeding is a personal choice and not for everyone. Whether or not you choose to breast-feed, your decision will be respected by our staff.    There are numerous benefits of breast-feeding; here are a few to consider:    Provides antibodies to protect your baby from infections and diseases    The cost: formula can cost over $1,500 per year    Convenience, no warming up or sterilizing bottles and supplies    The physical contact with breastfeeding can make babies feel secure, warm and comforted    What ever my feeding choice, what can I expect after I deliver my baby?    Your baby will usually be placed skin-to-skin immediately following birth. The skin to skin contact between you and your baby will be a special and memorable time. The bonding and attachment comforts your baby and has a positive effect on baby s brain development.     Having your baby  room in  with you also helps you start to learn your baby s body rhythms and sleep cycle.      You will also begin to learn your baby s cues (signals) that he or she is ready to feed.    When do I start to feed my baby?  As soon as possible after your baby s birth, you will be encouraged to begin feeding.  In the first couple of weeks, your baby will eat often.  Breastfeeding babies usually eat at least 8 times in 24 hours.  Babies fed formula usually eat at least 7 times in 24 hours.      Breast-feeding tips:    Get comfortable and use pillows for support.    Have your baby at the  level of your breast, facing you,  tummy to tummy .      Touch your nipple to your baby s lips so you baby s mouth opens wide (rooting reflex).  Aim the nipple toward the roof of your baby s mouth. When your baby opens his or her mouth, pull your baby toward your breast to help your baby  latch on  to your nipple and much of the areola area.    Hand expressing your breast milk can assist with latching your baby to your breast, if needed.    Ask for help, breastfeeding may seem awkward or uncomfortable at first, this is normal. There are numerous resources available at [facility] and beyond.     If your goal is to exclusively breastfeed, avoid using any formula or artificial nipples (including bottles and pacifiers) while you are your baby are learning to breastfeed unless there is a medical reason.       Mixing breastfeeding and formula can interfere with how you begin building your milk supply.  It can impact how you and your baby  learn  to breastfeeding together and alter the natural growth of  good  bacteria in your baby s stomach.    Delay a pacifier or a bottle in the first few weeks until breastfeeding is well established. This is often around 3 weeks of age.    Ask your nurse to show you how to hand express.   Breast milk can be kept in the refrigerator or freezer for later use.  (over)  Hospital Resources:  [facility] Lactation Clinics located at [facility], [facility] and [facility]  Call: [phone].    Inpatient support    Outpatient appointments    Telephone consultation    Breast-feeding classes available through [employer]      Online Resources:    [email or web address] sign up for free online weekly e-mail    [email or web address]/maternity    Breastfeedingmadesimple.com    [employer].org (La Leche League)    Normalfed.com    Womenshealth.gov/breastfeeding    Workandpump.com    Breast-feeding Supplies & Pumps:  Talk to your insurance provider or WIC  (Women, Infants and Children) to learn more about options available to you. Recent health insurance changes may include additional coverage for supplies and pumps.    Public Health:  Women, Infants and Children Nutrition program (WIC): provides breast-feeding support and education in addition to formal feeding moms. 866-SIR-2316 or http://www.health.[facility].us/divs/fh/wic    Family Health Home Visiting: [facility] Nurse home visits are available. Talk to your provider to see if you qualify. Most [facility] have a program available.    Additional Resources:  La Leche League is an international, nonprofit, nonsectarian organization offering information, education, and support to mothers who want to breast-feed their babies. Local groups offer phone help and monthly meetings. Visit [employer]ItsMyURLs or [employer].org and us the  Find local support  drop down menu or click on the  Resources  tab.    [place] Breastfeeding Resources: 4-431-721-BABY (4604) toll free    National Breastfeeding Help Line trained breastfeeding peer counselors can help answer common breast-feeding questions by phone. Monday-Friday: English/Nepali  [phone]- [phone] toll free, [phone] (TTY)    [facility] Connection: [phone] ([phone])

## 2021-06-23 NOTE — TELEPHONE ENCOUNTER
Medication Question or Clarification  Who is calling: Patient  What medication are you calling about?: citalopram  What dose do you take?: 40 mg  How often are you taking the medication?: TAKE 1 TABLET BY MOUTH DAILY  Who prescribed the medication?: Alecia Esteban MD   What is your question/concern?: Patient stated she would like a 2 week supply sent in until she gets her mail order Rx.  [facility]arm[facility]: [facility]  Okay to leave a detailed message?: No  Site CMT - Please call the pharmacy to obtain any additional needed information.

## 2021-06-23 NOTE — PATIENT INSTRUCTIONS - HE
Rhogam shot today.    Tdap shot next visit or 32 or 34 weeks.    Follow-up in 2 weeks.    Urine starting next visit.    If 3 or more squeezes (contractions) in and hour, please be evaluated and Labor and delivery).    OBGYN consult to pretty vaginal delivery after .    If right abdominal discomfort or belly button discomfort worsens or any bleeding, we will get an ultrasound.    Declined flu shot.     form done.    Added Bonnie Crawford for her 1 year old well child check at 11:00 on .    Patient Education   HEALTHY PREGNANCY CARE: 26-30 WEEKS PREGNANT    You are now in your last trimester of pregnancy. Your baby is growing rapidly, can open and close her eyelids, sometimes get hiccups, and you'll probably feel her moving around more often. Your baby has breathing movements and is gaining about one ounce each day. You may notice heartburn and leg cramps. Your back may ache as your body gets used to your baby's size and length.    Discuss your work situation with your midwife or physician as needed. If you stand for long periods of time, you may need to make changes and take breaks.    Pre-register online at the hospital where your baby will be born ([email or web address])     Be aware of your baby's activity level. You may be asked to do daily fetal movement counts. Contact your midwife or physician about any decreased movement.    You may have been tested for gestational diabetes today. If you are RH negative, you may have had an additional test and a Rhogam injection.    Consider receiving a Tdap vaccination to protect your baby from Pertussis/whooping cough.    If you are considering tubal ligation, discuss this with your midwife or physician. You will need to have an appointment with the obstetrician who will do the surgery to discuss the risks, benefits, and alternatives, and to sign the consent. This can be discussed at your next  visit.    Continue to watch for any signs or symptoms of premature labor:     Regular contractions. This means having about 6 or more within 1 hour, even after you have had a glass of water and are resting.     A backache that starts and stops regularly.    An increase or change in vaginal discharge, such as heavy, mucus-like, watery, or bloody discharge.     Your water breaks or leaks.    Continue to watch for signs and symptoms of preeclampsia:     Sudden swelling of your face, hands, or feet     New vision problems such as blurring, double vision, or flashing lights    A severe headache not relieved with acetaminophen (Tylenol)    Sharp or stabbing pain in your right or middle upper abdomen    If you have any of the above symptoms or any other concerns, call your midwife or physician or their clinic staff at [facility] FAMILY MEDICINE/OB at Phone: [phone]. If it's after clinic hours, physician patients should call the Care Connection at [phone] ([phone]); midwife patients should call their answering service at [phone].    How can you care for yourself at home?   You can refer to the Starting Out Right book or find it online at http://www.[email or web address]/images/stories/maternity/[email or web address]-[email or web address]-Right.pdf or http://www.[facility].org/images/stories/flipbooks/[facility]-starting-out-right/[facility]-starting-out-right.html#p=8     You can sign up for a weekly parenting e-mail that gives support, tips and advice from health care professionals that starts with pregnancy and continues through the toddler years. To register, go to [email or web address] at any time during your pregnancy.      Baby Feeding in the Hospital: Information, Support and Resources    As you prepare for the birth of your child, you will want to consider options for feeding your baby including breast-feeding and/or baby formula. The American Academy of Pediatrics recommends exclusive breast-feeding for the first six  months (although any amount of breast-feeding is beneficial).  However, we also understand that breast-feeding is a personal choice and not for everyone. Whether or not you choose to breast-feed, your decision will be respected by our staff.    There are numerous benefits of breast-feeding; here are a few to consider:    Provides antibodies to protect your baby from infections and diseases    The cost: formula can cost over $1,500 per year    Convenience, no warming up or sterilizing bottles and supplies    The physical contact with breastfeeding can make babies feel secure, warm and comforted    What ever my feeding choice, what can I expect after I deliver my baby?    Your baby will usually be placed skin-to-skin immediately following birth. The skin to skin contact between you and your baby will be a special and memorable time. The bonding and attachment comforts your baby and has a positive effect on baby s brain development.     Having your baby  room in  with you also helps you start to learn your baby s body rhythms and sleep cycle.      You will also begin to learn your baby s cues (signals) that he or she is ready to feed.    When do I start to feed my baby?  As soon as possible after your baby s birth, you will be encouraged to begin feeding.  In the first couple of weeks, your baby will eat often.  Breastfeeding babies usually eat at least 8 times in 24 hours.  Babies fed formula usually eat at least 7 times in 24 hours.      Breast-feeding tips:    Get comfortable and use pillows for support.    Have your baby at the level of your breast, facing you,  tummy to tummy .      Touch your nipple to your baby s lips so you baby s mouth opens wide (rooting reflex).  Aim the nipple toward the roof of your baby s mouth. When your baby opens his or her mouth, pull your baby toward your breast to help your baby  latch on  to your nipple and much of the areola area.    Hand expressing your breast milk can assist  with latching your baby to your breast, if needed.    Ask for help, breastfeeding may seem awkward or uncomfortable at first, this is normal. There are numerous resources available at [facility] and beyond.     If your goal is to exclusively breastfeed, avoid using any formula or artificial nipples (including bottles and pacifiers) while you are your baby are learning to breastfeed unless there is a medical reason.       Mixing breastfeeding and formula can interfere with how you begin building your milk supply.  It can impact how you and your baby  learn  to breastfeeding together and alter the natural growth of  good  bacteria in your baby s stomach.    Delay a pacifier or a bottle in the first few weeks until breastfeeding is well established. This is often around 3 weeks of age.    Ask your nurse to show you how to hand express.   Breast milk can be kept in the refrigerator or freezer for later use.  (over)  Hospital Resources:  [facility] Lactation Clinics located at [facility], [facility] and [facility]  Call: [phone].    Inpatient support    Outpatient appointments    Telephone consultation    Breast-feeding classes available through [employer]      Online Resources:    [email or web address]/baby sign up for free online weekly e-mail    [email or web address]    Breastfeedingmadesimple.com    Llli.org (La Leche League)    Normalfed.com    Womenshealth.gov/breastfeeding    Workandpump.com    Breast-feeding Supplies & Pumps:  Talk to your insurance provider or WIC (Women, Infants and Children) to learn more about options available to you. Recent health insurance changes may include additional coverage for supplies and pumps.    Public Health:  Women, Infants and Children Nutrition program (WIC): provides breast-feeding support and education in addition to formal feeding moms. 255-LWU-8088 or http://www.health.[facility].[place]/divs/fh/wic    Family  Health Home Visiting: [facility] Nurse home visits are available. Talk to your provider to see if you qualify. Most counties have a program available.    Additional Resources:  La Leche League is an international, nonprofit, nonsectarian organization offering information, education, and support to mothers who want to breast-feed their babies. Local groups offer phone help and monthly meetings. Visit Skipjump.org or [employer].org and us the  Find local support  drop down menu or click on the  Resources  tab.    [place] Breastfeeding Resources: 7-178-550-BABY ([phone]) toll free    National Breastfeeding Help Line trained breastfeeding peer counselors can help answer common breast-feeding questions by phone. Monday-Friday: English/Setswana  7-038- [phone] toll free, [phone] (TTY)    [facility] Care Connection: [phone] ([phone])

## 2021-06-23 NOTE — TELEPHONE ENCOUNTER
Refill Request  Did you contact pharmacy: No  Medication name:   Requested Prescriptions     Pending Prescriptions Disp Refills     citalopram (CELEXA) 40 MG tablet 90 tablet 2     Sig: Take 1 tablet (40 mg total) by mouth daily.     Who prescribed the medication: Alecia Esteban MD   Pharmacy Name and Location: [facility]  Is patient out of medication: Yes, but a separate encounter was sent for patient to get a 2-week supply, sent to a local pharmacy.  Patient notified refills processed in 72 hours:  yes  Okay to leave a detailed message: no    New pharmacy

## 2021-06-23 NOTE — TELEPHONE ENCOUNTER
Refill Approved    Rx renewed per Medication Renewal Policy. Medication was last renewed on 1/29/19.    Helga Mariee, [facility] Triage/Med Refill 1/30/2019     Requested Prescriptions   Pending Prescriptions Disp Refills     citalopram (CELEXA) 40 MG tablet 90 tablet 2     Sig: Take 1 tablet (40 mg total) by mouth daily.    SSRI Refill Protocol  Passed - 1/29/2019 11:06 AM       Passed - PCP or prescribing provider visit in last year    Last office visit with prescriber/PCP: 10/10/2018 Alecia Esteban MD OR same dept: 10/24/2018 Kelsi Muller MD OR same specialty: 10/24/2018 Kelsi Muller MD  Last physical: 7/29/2015 Last MTM visit: Visit date not found   Next visit within 3 mo: Visit date not found  Next physical within 3 mo: Visit date not found  Prescriber OR PCP: Alecia Esteban MD  Last diagnosis associated with med order: 1. Depression  - citalopram (CELEXA) 40 MG tablet; Take 1 tablet (40 mg total) by mouth daily.  Dispense: 90 tablet; Refill: 2    If protocol passes may refill for 12 months if within 3 months of last provider visit (or a total of 15 months).

## 2021-06-23 NOTE — TELEPHONE ENCOUNTER
Who is calling:   patient  Reason for Call:  Will only be picking up 14 of the tablets as she is paying for cash  Date of last appointment with primary care:   10/24/2018  Has the patient been recently seen:  No  Okay to leave a detailed message: Yes

## 2021-06-24 NOTE — PATIENT INSTRUCTIONS - HE
Vascular clinic consult if wishes to rule our varicose veins and for treatment of spider veins if wishes when done having babies or if painful and need treatment.    Support socks on in the am and off in the pm if wishes.    Recommend 64-96 oz of non-caffenated fluid per day.    Tdap shot next visit.  Follow-up in 2-3 weeks.  Urine each visit.  If 3 or more squeezes (contractions) in and hour, please be evaluated and Labor and delivery).  OBGYN consult to dicuss vaginal delivery after .  If right abdominal discomfort or belly button discomfort worsens or any bleeding, we will get an ultrasound.  Declined flu shot.  Dermatology consult after delivery.  If baby not head down on exam at 34 weeks, will get in office ultrasound.  Cervical checks starting next visit.    Patient Education   HEALTHY PREGNANCY CARE: 30-34 WEEKS PREGNANT    You have made it to the final months of your pregnancy. By now, your baby is starting to fill out with some fat under his skin, fuzzy hair on his shoulders, and is gaining 4 to 6 ounces per week.    Discuss any travel plans with your midwife or physician.    Review possible changes in sexuality during later pregnancy and discuss these with your midwife or physician, as well as your partner. Alternative love-making positions may be more comfortable.    Discuss labor and delivery issues with your midwife or physician. If you had a  birth in the past, discuss a trial of labor with your midwife or physician. He or she may ask that you sign a consent form, if you wish to have a vaginal birth after  (). Ask your midwife or physician to explain your options for managing pain during your labor and delivery. Sometimes, during the birth process, an episiotomy may need to be cut in the vagina to make the opening bigger or let the baby come out quicker. You may want to discuss the episiotomy and how often it is needed with your midwife or physician.    Plan for your baby's  care by selecting a child health care provider (Family physician, Pediatrician, or Pediatric Nurse Practitioner). Practice installing an infant car seat correctly in the car. Ask for car seat information as needed and make sure it is safe and will work in the car your baby will ride in. You will need a car seat to bring your baby home from the hospital. Check the procedure for adding your baby to your health care plan. Review your decision about circumcision and ask for any information you need. As you buy and receive items for your baby, don't put a baby walker on your list. Walkers can be dangerous and can cause serious injury to your child. A safer option is a saucer-type play station, since it doesn't allow baby to travel across the floor.    Discuss your choices and plans for birth control with your midwife or physician. Women who are breastfeeding can still become pregnant. Use a birth control method if you want to lower your pregnancy risk. Talk to your midwife or physician if you are considering permanent birth control, such as tubal ligation or Essure. You may need to complete a consent form 30 days prior to delivery in order to have this done after you deliver.    Continue to watch for signs and symptoms of preeclampsia:     Sudden swelling of your face, hands, or feet     New vision problems such as blurring, double vision, or flashing lights    A severe headache not relieved with acetaminophen (Tylenol)    Sharp or stabbing pain in your right or middle upper abdomen    Watch for signs and symptoms of premature labor:     Regular contractions. This means having about 6 or more within 1 hour, even after you have had a glass of water and are resting.     A backache that starts and stops regularly.    An increase or change in vaginal discharge, such as heavy, mucus-like, watery, or bloody discharge.     Your water breaks or leaks.    If you have any of the above symptoms or any other concerns, call your  provider or their clinic staff at [facility] FAMILY MEDICINE/OB at Phone: [phone]. If it's after clinic hours, physician patients should call the Care Connection at [phone] ([phone]); midwife patients should call their answering service at [phone].    How can you care for yourself at home?   You can refer to the Starting Out Right book or find it online at http://www.[email or web address]/images/stories/maternity/[facility]-Starting-Out-Right.pdf or http://www.[facility].org/images/stories/flipbooks/[facility]-starting-out-right/[facility]-starting-out-right.html#p=8     You can sign up for a weekly parenting e-mail that gives support, tips and advice from health care professionals that starts with pregnancy and continues through the toddler years. To register, go to [email or web address] at any time during your pregnancy.

## 2021-06-24 NOTE — PROGRESS NOTES
[facility] Note    Tracey Crawford  1990   [id number]    Tracey Crawford is a 28 y.o. female presenting to discuss the following:     CC:   Chief Complaint   Patient presents with     Groin Swelling       HPI:  Nearly 32 weeks pregnant. Noticed swelling of left labia. Feels more firm to the touch. Feels like a swollen vein. Doesn't hurt.     This pregnancy in last month, having more spider veins, maybe some varicose veins behind right knee. No hemorrhoids.     She is feeling baby move. Nothing protruding from vagina.     ROS:   See HPI above.     PMH:   Patient Active Problem List   Diagnosis     Attention Deficit Disorder Without Hyperactivity     Major Depression, Recurrent     Anxiety     Hyperlipidemia     Dyshidrotic eczema     Pregnant     Carpal tunnel syndrome     S/P primary low transverse      Atypical nevi       Past Medical History:   Diagnosis Date     Joint Stiffness Finger(S)     right 5th finger       PSH:   Past Surgical History:   Procedure Laterality Date      SECTION N/A 2017    Procedure:  SECTION;  Surgeon: Ilir Dimas MD;  Location: [facility] OR;  Service:          MEDICATIONS:   Current Outpatient Medications on File Prior to Visit   Medication Sig Dispense Refill     albuterol (PROAIR HFA;PROVENTIL HFA;VENTOLIN HFA) 90 mcg/actuation inhaler Inhale 2 puffs every 6 (six) hours as needed for wheezing.       citalopram (CELEXA) 40 MG tablet Take 1 tablet (40 mg total) by mouth daily. 90 tablet 2     prenatal vitamin iron-folic acid 27mg-0.8mg (PRENATAL S) 27 mg iron- 800 mcg Tab tablet Take 1 tablet by mouth daily.       triamcinolone (KENALOG) 0.5 % cream APPLY EXTERNALLY TO THE AFFECTED AREA 2 TO 3 TIMES DAILY 60 g 3     No current facility-administered medications on file prior to visit.        ALLERGIES:  No Known Allergies    PHYSICAL EXAM:   /60   Pulse 80   Temp 98.1  F (36.7  C) (Oral)   Wt 171 lb (77.6 kg)   LMP 2018    BMI 29.35 kg/m     GENERAL: Tracey is a pleasant, well appearing female, in no acute distress.   HEART: Regular rate and rhythm, no murmurs.   LUNGS: Clear to ausculation bilaterally, unlabored.   ABDOMEN: Gravid uterus.   GYN: posterior left labia with soft mass, feels like small cluster of grapes, approximately 1 cm in diameter.     ASSESSMENT & PLAN:   Tracey Crawford is a 28 y.o. female presenting today for evaluation of vulvar mass.    1. Vulval obstetric varicose veins in third trimester  Lesion is consistent with vulvar varicosity  Discussed that this is benign and will likely go away after delivery.   No increased risk to pregnancy.   Try not to lay flat on back, try to move regularly throughout the day   If becomes bothersome, can suggest compression with pelvic supporter.   Safe to go to girls weekend this weekend, no restrictions on activities.     RTC: routine OB care     Lisette Terrazas,

## 2021-06-24 NOTE — PATIENT INSTRUCTIONS - HE
TDAP shot today    Follow up in 3 weeks then weekly after that.    If 3 or more squeezes (contractions) in an hour, please be evaluated and Labor and delivery.    OBGYN consult to discuss vaginal delivery after .  [facility] telephone: [phone]    Vascular clinic consult if wishes to rule our varicose veins and for treatment of spider veins if wishes when done having babies or if painful and need treatment.    Support socks on in the am and off in the pm if wishes.     WARM WASHCLOTH OR TUB SOAK AS NEEDED FOR THE VARICOSE VEIN OF VULVA.    Patient Education   HEALTHY PREGNANCY CARE: 30-34 WEEKS PREGNANT    You have made it to the final months of your pregnancy. By now, your baby is starting to fill out with some fat under his skin, fuzzy hair on his shoulders, and is gaining 4 to 6 ounces per week.    Discuss any travel plans with your midwife or physician.    Review possible changes in sexuality during later pregnancy and discuss these with your midwife or physician, as well as your partner. Alternative love-making positions may be more comfortable.    Discuss labor and delivery issues with your midwife or physician. If you had a  birth in the past, discuss a trial of labor with your midwife or physician. He or she may ask that you sign a consent form, if you wish to have a vaginal birth after  (). Ask your midwife or physician to explain your options for managing pain during your labor and delivery. Sometimes, during the birth process, an episiotomy may need to be cut in the vagina to make the opening bigger or let the baby come out quicker. You may want to discuss the episiotomy and how often it is needed with your midwife or physician.    Plan for your baby's care by selecting a child health care provider (Family physician, Pediatrician, or Pediatric Nurse Practitioner). Practice installing an infant car seat correctly in the car. Ask for car seat information as needed and  make sure it is safe and will work in the car your baby will ride in. You will need a car seat to bring your baby home from the hospital. Check the procedure for adding your baby to your health care plan. Review your decision about circumcision and ask for any information you need. As you buy and receive items for your baby, don't put a baby walker on your list. Walkers can be dangerous and can cause serious injury to your child. A safer option is a saucer-type play station, since it doesn't allow baby to travel across the floor.    Discuss your choices and plans for birth control with your midwife or physician. Women who are breastfeeding can still become pregnant. Use a birth control method if you want to lower your pregnancy risk. Talk to your midwife or physician if you are considering permanent birth control, such as tubal ligation or Essure. You may need to complete a consent form 30 days prior to delivery in order to have this done after you deliver.    Continue to watch for signs and symptoms of preeclampsia:     Sudden swelling of your face, hands, or feet     New vision problems such as blurring, double vision, or flashing lights    A severe headache not relieved with acetaminophen (Tylenol)    Sharp or stabbing pain in your right or middle upper abdomen    Watch for signs and symptoms of premature labor:     Regular contractions. This means having about 6 or more within 1 hour, even after you have had a glass of water and are resting.     A backache that starts and stops regularly.    An increase or change in vaginal discharge, such as heavy, mucus-like, watery, or bloody discharge.     Your water breaks or leaks.    If you have any of the above symptoms or any other concerns, call your provider or their clinic staff at [facility]/OB at Phone: [phone]. If it's after clinic hours, physician patients should call the [facility] at [phone] ([phone]); midwife patients should  call their answering service at [phone].    How can you care for yourself at home?   You can refer to the Starting Out Right book or find it online at [email or web address]/stories/[email or web address]-[email or web address]-Right.pdf or http://www.[email or web address]/images/stories/flipbooks/[facility]-starting-out-right/[facility]-starting-out-right.html#p=8     You can sign up for a weekly parenting e-mail that gives support, tips and advice from health care professionals that starts with pregnancy and continues through the toddler years. To register, go to [email or web address] at any time during your pregnancy.     Patient Education   HEALTHY PREGNANCY CARE: 30-34 WEEKS PREGNANT    You have made it to the final months of your pregnancy. By now, your baby is starting to fill out with some fat under his skin, fuzzy hair on his shoulders, and is gaining 4 to 6 ounces per week.    Discuss any travel plans with your midwife or physician.    Review possible changes in sexuality during later pregnancy and discuss these with your midwife or physician, as well as your partner. Alternative love-making positions may be more comfortable.    Discuss labor and delivery issues with your midwife or physician. If you had a  birth in the past, discuss a trial of labor with your midwife or physician. He or she may ask that you sign a consent form, if you wish to have a vaginal birth after  (). Ask your midwife or physician to explain your options for managing pain during your labor and delivery. Sometimes, during the birth process, an episiotomy may need to be cut in the vagina to make the opening bigger or let the baby come out quicker. You may want to discuss the episiotomy and how often it is needed with your midwife or physician.    Plan for your baby's care by selecting a child health care provider (Family physician, Pediatrician, or Pediatric Nurse Practitioner). Practice installing an infant car seat  correctly in the car. Ask for car seat information as needed and make sure it is safe and will work in the car your baby will ride in. You will need a car seat to bring your baby home from the hospital. Check the procedure for adding your baby to your health care plan. Review your decision about circumcision and ask for any information you need. As you buy and receive items for your baby, don't put a baby walker on your list. Walkers can be dangerous and can cause serious injury to your child. A safer option is a saucer-type play station, since it doesn't allow baby to travel across the floor.    Discuss your choices and plans for birth control with your midwife or physician. Women who are breastfeeding can still become pregnant. Use a birth control method if you want to lower your pregnancy risk. Talk to your midwife or physician if you are considering permanent birth control, such as tubal ligation or Essure. You may need to complete a consent form 30 days prior to delivery in order to have this done after you deliver.    Continue to watch for signs and symptoms of preeclampsia:     Sudden swelling of your face, hands, or feet     New vision problems such as blurring, double vision, or flashing lights    A severe headache not relieved with acetaminophen (Tylenol)    Sharp or stabbing pain in your right or middle upper abdomen    Watch for signs and symptoms of premature labor:     Regular contractions. This means having about 6 or more within 1 hour, even after you have had a glass of water and are resting.     A backache that starts and stops regularly.    An increase or change in vaginal discharge, such as heavy, mucus-like, watery, or bloody discharge.     Your water breaks or leaks.    If you have any of the above symptoms or any other concerns, call your provider or their clinic staff at [facility]IC[facility]/OB at Phone: [phone]. If it's after clinic hours, physician patients should call the  [facility] at [phone]); midwife patients should call their answering service at [phone].    How can you care for yourself at home?   You can refer to the Starting Out Right book or find it online at [email or web address]/stories/maternity/[facility]-Starting-Out-Right.pdf or http://www.[facility].org/images/stories/flipbooks/[facility]-starting-out-right/[facility]-starting-out-right.html#p=8     You can sign up for a weekly parenting e-mail that gives support, tips and advice from health care professionals that starts with pregnancy and continues through the toddler years. To register, go to [email or web address] at any time during your pregnancy.

## 2021-06-24 NOTE — PROGRESS NOTES
S: Overall doing well.  She is having some shortness of breath with laying down at night which she read could be normal.  No pleuritic chest pain.  Not having shortness of breath during the daytime.  No calf pain redness or warmth.  She does mention some spider veins that have quickly appeared on her legs over the last few weeks.  She wonders what can be done about them after delivery.  She is having about 2-3 contractions a day but they are not regular meaning not 3 or more an hour and not painful.  She denies any further pain around her umbilicus.  Still some lower abdominal discomfort which seems like round ligament pain.  Has not changed.  No blood from the vagina.  No other concerns.    O: Urine with trace protein and small leukocytes, deep tendon reflexes 2+ and symmetric of the lower extremities, black nevi and right upper quadrant of the abdomen    A/P: 30-4/7 weeks gestation doing well.  Did get RhoGam shot last week.  Vascular clinic consult if wishes to rule our varicose veins and for treatment of spider veins if wishes when done having babies or if painful and need treatment.  Support socks on in the am and off in the pm if wishes.  Recommend 64-96 oz of non-caffenated fluid per day.  Tdap shot next visit.  Follow-up in 2-3 weeks.  Urine each visit.  If 3 or more squeezes (contractions) in and hour, please be evaluated and Labor and delivery).  OBGYN consult to pretty vaginal delivery after .  If right abdominal discomfort or belly button discomfort worsens or any bleeding, we will get an ultrasound.  Declined flu shot.  Dermatology consult after delivery.  If baby not head down on exam at 34 weeks, will get in office ultrasound.  Cervical checks starting next visit.

## 2021-06-24 NOTE — TELEPHONE ENCOUNTER
RN triage   Call from pt   Pt states she is 32 weeks pregnant   Woke this AM w/ swelling and lumpy L vaginal lip --   No pain or burning or itching   No redness  No rash or blisters or sores  No bleeding or discharge -- no abd pain or contractions  No fever  No bladder symptoms   No other swelling anywhere  Advised pt be seen   Transferred to    Karla Saleh RN [facility] RN triage    Reason for Disposition    All other vaginal symptoms  (Exception: feels like prior yeast infection, minor abrasion, mild rash < 24 hour duration, mild itching)    Protocols used: VAGINAL SYMPTOMS-A-AH

## 2021-06-24 NOTE — PROGRESS NOTES
S: 32w5d gestation. Patient states she was seen in the clinic by Dr. Terrazas on 3/1/19 for a vulva varicose vein on her left side that she first noticed that day. She states that she thinks the size has remained the same. She denies pelvic pressure, but does note that she has to use the bathroom more frequently. She denies swelling in her extremities.    O: UA: trace leukocytes. Deep tendon reflexes 2+. Left vulvar varicose vein.    A/P:  TDAP shot today  Follow up in 3 weeks then weekly.  If 3 or more squeezes (contractions) in an hour, please be evaluated and Labor and delivery).  OBGYN consult to discuss vaginal delivery after .  [facility] telephone: [phone]  Vascular clinic consult if wishes to rule our varicose veins and for treatment of spider veins if wishes when done having babies or if painful and need treatment.  Support socks on in the am and off in the pm if wishes.    ADDITIONAL HISTORY SUMMARIZED (2): 3/1/19 office visit note reviewed regarding varicose vein  DECISION TO OBTAIN EXTRA INFORMATION (1): None.   RADIOLOGY TESTS (1): None.  LABS (1): None.  MEDICINE TESTS (1): None.  INDEPENDENT REVIEW (2 each): None.       The visit lasted a total of 30 minutes face to face with the patient. Over 50% of the time was spent counseling and educating the patient about pregnancy.    IUzma, am scribing for and in the presence of, Dr. Esteban at  3/7/19 . 5:45pm    I, Dr. Esteban, personally performed the services described in this documentation, as scribed by Uzma Fowler in my presence, and it is both accurate and complete.    Total data points: 2

## 2021-06-24 NOTE — TELEPHONE ENCOUNTER
New Appointment Needed  What is the reason for the visit:    Same Date/Next Day Appt Request  What is the reason for your visit?:  32 week Routine OB visit- Patient had appointment scheduled today with Dr Esteban at 2:40 pm and had to cancel due to not having .    Provider Preference: PCP only  How soon do you need to be seen?: today, Patient is asking if Dr. Esteban could fit her in schedule at 4:00 or later.  Waitlist offered?: No  Okay to leave a detailed message:  Yes

## 2021-06-26 NOTE — TELEPHONE ENCOUNTER
Controlled Substance Refill Request  Medication Name:   Requested Prescriptions     Pending Prescriptions Disp Refills     dextroamphetamine-amphetamine (ADDERALL) 10 mg Tab tablet 60 tablet 0     Sig: Take 1 tablet by mouth 2 (two) times a day.     Date Last Fill: 5/4/21  Requested [facility]: [facility]  Submit electronically to pharmacy  Controlled Substance Agreement on file:   Encounter-Level CSA Scan Date:    There are no encounter-level csa scan date.        Last office visit:  5/13/21  Marian Gutiérrez RN, MA  [facility]    Triage Nurse Advisor

## 2021-06-26 NOTE — TELEPHONE ENCOUNTER
Incoming call from patient stating she is out and will need Rx today. Patient would like provider to send it asap to the pharmacy. Communicated to patient the message was sent to the provider already but I can send her message as well. Patient stated understanding and would like a call back once rx sent.

## 2021-07-03 NOTE — ADDENDUM NOTE
Addendum Note by Alecia Esteban MD at 7/22/2019  6:00 PM     Author: Alecia Esteban MD Service: -- Author Type: Physician    Filed: 7/23/2019  3:19 PM Encounter Date: 7/22/2019 Status: Signed    : Alecia Esteban MD (Physician)    Addended by: ALECIA ESTEBAN on: 7/23/2019 03:19 PM        Modules accepted: Orders

## 2021-07-14 PROBLEM — Z98.891 S/P PRIMARY LOW TRANSVERSE C-SECTION: Status: RESOLVED | Noted: 2017-04-19 | Resolved: 2019-07-22

## 2021-08-10 ENCOUNTER — TELEPHONE (OUTPATIENT)
Dept: FAMILY MEDICINE | Facility: CLINIC | Age: 31
End: 2021-08-10

## 2021-08-10 DIAGNOSIS — F98.8 ATTENTION DEFICIT DISORDER (ADD) WITHOUT HYPERACTIVITY: Primary | ICD-10-CM

## 2021-08-10 RX ORDER — DEXTROAMPHETAMINE SACCHARATE, AMPHETAMINE ASPARTATE, DEXTROAMPHETAMINE SULFATE AND AMPHETAMINE SULFATE 2.5; 2.5; 2.5; 2.5 MG/1; MG/1; MG/1; MG/1
10 TABLET ORAL 2 TIMES DAILY
Qty: 60 TABLET | Refills: 0 | Status: SHIPPED | OUTPATIENT
Start: 2021-08-10 | End: 2021-09-07

## 2021-08-10 NOTE — TELEPHONE ENCOUNTER
Reason for Call:  Medication or medication refill:    Do you use a [facility]?  Name of the pharmacy and phone number for the current request:    [id number] - [place], [place] - [address]    Name of the medication requested: dextroamphetamine-amphetamine (ADDERALL) 10 mg Tab tablet    Other request: pt is out of medication so requesting that covering provider look at this request    Can we leave a detailed message on this number? YES    Phone number patient can be reached at: Cell number on file:    Telephone Information:   Mobile [phone]       Best Time: na    Call taken on 8/10/2021 at 11:47 AM by Kelin Jeronimo

## 2021-08-14 ENCOUNTER — HEALTH MAINTENANCE LETTER (OUTPATIENT)
Age: 31
End: 2021-08-14

## 2021-09-07 ENCOUNTER — MYC REFILL (OUTPATIENT)
Dept: FAMILY MEDICINE | Facility: CLINIC | Age: 31
End: 2021-09-07

## 2021-09-07 DIAGNOSIS — F98.8 ATTENTION DEFICIT DISORDER (ADD) WITHOUT HYPERACTIVITY: ICD-10-CM

## 2021-09-08 RX ORDER — DEXTROAMPHETAMINE SACCHARATE, AMPHETAMINE ASPARTATE, DEXTROAMPHETAMINE SULFATE AND AMPHETAMINE SULFATE 2.5; 2.5; 2.5; 2.5 MG/1; MG/1; MG/1; MG/1
10 TABLET ORAL 2 TIMES DAILY
Qty: 60 TABLET | Refills: 0 | Status: SHIPPED | OUTPATIENT
Start: 2021-09-08 | End: 2021-10-20

## 2021-09-08 NOTE — TELEPHONE ENCOUNTER
Routing refill request to provider for review/approval because:  Controlled substance request    Last Written Prescription Date:  8/10/21  Last Fill Quantity: 60,  # refills: 0   Last office visit provider:  5/13/21     Requested Prescriptions   Pending Prescriptions Disp Refills     amphetamine-dextroamphetamine (ADDERALL) 10 MG tablet 60 tablet 0     Sig: Take 1 tablet (10 mg) by mouth 2 times daily Take 1 tablet by mouth       There is no refill protocol information for this order          Pedro Enciso RN 09/08/21 7:59 AM

## 2021-10-09 ENCOUNTER — HEALTH MAINTENANCE LETTER (OUTPATIENT)
Age: 31
End: 2021-10-09

## 2021-10-20 ENCOUNTER — MYC MEDICAL ADVICE (OUTPATIENT)
Dept: FAMILY MEDICINE | Facility: CLINIC | Age: 31
End: 2021-10-20

## 2021-10-20 ENCOUNTER — MYC REFILL (OUTPATIENT)
Dept: FAMILY MEDICINE | Facility: CLINIC | Age: 31
End: 2021-10-20

## 2021-10-20 DIAGNOSIS — F98.8 ATTENTION DEFICIT DISORDER (ADD) WITHOUT HYPERACTIVITY: ICD-10-CM

## 2021-10-20 DIAGNOSIS — F33.9 EPISODE OF RECURRENT MAJOR DEPRESSIVE DISORDER, UNSPECIFIED DEPRESSION EPISODE SEVERITY (H): Primary | ICD-10-CM

## 2021-10-20 RX ORDER — BUPROPION HYDROCHLORIDE 150 MG/1
TABLET ORAL
Qty: 90 TABLET | Refills: 3 | Status: SHIPPED | OUTPATIENT
Start: 2021-10-20 | End: 2022-10-24

## 2021-10-20 RX ORDER — BUPROPION HYDROCHLORIDE 150 MG/1
TABLET ORAL
Qty: 90 TABLET | Refills: 3 | Status: CANCELLED | OUTPATIENT
Start: 2021-10-20

## 2021-10-21 NOTE — TELEPHONE ENCOUNTER
Routing refill request to provider for review/approval because:  Controlled Substance Request     Last Written Prescription Date:  9/8/21  Last Fill Quantity: 60,  # refills: 0   Last office visit provider:  5/13/21    Requested Prescriptions   Pending Prescriptions Disp Refills     amphetamine-dextroamphetamine (ADDERALL) 10 MG tablet 60 tablet 0     Sig: Take 1 tablet (10 mg) by mouth 2 times daily Take 1 tablet by mouth       There is no refill protocol information for this order          Heather Negron RN 10/21/21 11:03 AM

## 2021-10-22 RX ORDER — DEXTROAMPHETAMINE SACCHARATE, AMPHETAMINE ASPARTATE, DEXTROAMPHETAMINE SULFATE AND AMPHETAMINE SULFATE 2.5; 2.5; 2.5; 2.5 MG/1; MG/1; MG/1; MG/1
10 TABLET ORAL 2 TIMES DAILY
Qty: 60 TABLET | Refills: 0 | Status: SHIPPED | OUTPATIENT
Start: 2021-10-22 | End: 2021-11-30

## 2021-11-30 ENCOUNTER — MYC REFILL (OUTPATIENT)
Dept: FAMILY MEDICINE | Facility: CLINIC | Age: 31
End: 2021-11-30
Payer: COMMERCIAL

## 2021-11-30 DIAGNOSIS — F98.8 ATTENTION DEFICIT DISORDER (ADD) WITHOUT HYPERACTIVITY: ICD-10-CM

## 2021-12-01 RX ORDER — DEXTROAMPHETAMINE SACCHARATE, AMPHETAMINE ASPARTATE, DEXTROAMPHETAMINE SULFATE AND AMPHETAMINE SULFATE 2.5; 2.5; 2.5; 2.5 MG/1; MG/1; MG/1; MG/1
10 TABLET ORAL 2 TIMES DAILY
Qty: 60 TABLET | Refills: 0 | Status: SHIPPED | OUTPATIENT
Start: 2021-12-01 | End: 2022-01-12

## 2021-12-01 NOTE — TELEPHONE ENCOUNTER
Routing refill request to provider for review/approval because:  Controlled substance request    Last Written Prescription Date:  10/22/21  Last Fill Quantity: 60,  # refills: 0   Last office visit provider:  5/13/21     Requested Prescriptions   Pending Prescriptions Disp Refills     amphetamine-dextroamphetamine (ADDERALL) 10 MG tablet 60 tablet 0     Sig: Take 1 tablet (10 mg) by mouth 2 times daily Take 1 tablet by mouth       There is no refill protocol information for this order          Pedro Enciso RN 12/01/21 3:06 PM

## 2022-01-12 ENCOUNTER — MYC REFILL (OUTPATIENT)
Dept: FAMILY MEDICINE | Facility: CLINIC | Age: 32
End: 2022-01-12
Payer: COMMERCIAL

## 2022-01-12 DIAGNOSIS — F98.8 ATTENTION DEFICIT DISORDER (ADD) WITHOUT HYPERACTIVITY: ICD-10-CM

## 2022-01-12 RX ORDER — DEXTROAMPHETAMINE SACCHARATE, AMPHETAMINE ASPARTATE, DEXTROAMPHETAMINE SULFATE AND AMPHETAMINE SULFATE 2.5; 2.5; 2.5; 2.5 MG/1; MG/1; MG/1; MG/1
10 TABLET ORAL 2 TIMES DAILY
Qty: 60 TABLET | Refills: 0 | Status: SHIPPED | OUTPATIENT
Start: 2022-01-12 | End: 2022-02-15

## 2022-02-15 ENCOUNTER — MYC REFILL (OUTPATIENT)
Dept: FAMILY MEDICINE | Facility: CLINIC | Age: 32
End: 2022-02-15
Payer: COMMERCIAL

## 2022-02-15 DIAGNOSIS — F98.8 ATTENTION DEFICIT DISORDER (ADD) WITHOUT HYPERACTIVITY: ICD-10-CM

## 2022-02-15 RX ORDER — DEXTROAMPHETAMINE SACCHARATE, AMPHETAMINE ASPARTATE, DEXTROAMPHETAMINE SULFATE AND AMPHETAMINE SULFATE 2.5; 2.5; 2.5; 2.5 MG/1; MG/1; MG/1; MG/1
10 TABLET ORAL 2 TIMES DAILY
Qty: 60 TABLET | Refills: 0 | Status: SHIPPED | OUTPATIENT
Start: 2022-02-15 | End: 2022-03-18

## 2022-03-15 ENCOUNTER — NURSE TRIAGE (OUTPATIENT)
Dept: NURSING | Facility: CLINIC | Age: 32
End: 2022-03-15
Payer: COMMERCIAL

## 2022-03-15 NOTE — TELEPHONE ENCOUNTER
4 bloody noses in last 36 hours.    None now    Will go to ER or urgent care if gets another in the next day or so that lasts more than 15 minutes.    Also has cold sores.  She is using lysine vitamins and patches.  Will talk to pcp about other options at May visit.    Care advice given. Caller verbalizes understanding    Ailyn BACA [facility] Nurse Advisor      Reason for Disposition    Nosebleed and needs instruction in correct technique of applying direct pressure    Additional Information    Negative: Fainted (passed out), or too weak to stand following large blood loss    Negative: Sounds like a life-threatening emergency to the triager    Negative: Nosebleed followed nose injury    Negative: Bleeding present > 30 minutes and using correct method of direct pressure    Negative: Bleeding now and second call after being instructed in correct technique of direct pressure    Negative: Lightheadedness or dizziness    Negative: Pale skin (pallor) of new onset or worsening    Negative: Has nasal packing (inserted by health care provider to control bleeding) and now has new rash    Negative: Has nasal packing and now has bleeding around the packing (Exception: few drops or ooze)    Negative: Patient sounds very sick or weak to the triager    Negative: Large amount of blood has been lost (e.g., one cup)    Negative: Bleeding recurs 3 or more times in 24 hours despite direct pressure    Negative: Taking Coumadin (warfarin) or other strong blood thinner, or known bleeding disorder (e.g., thrombocytopenia)    Negative: Has skin bruises or bleeding gums that are not caused by an injury    Negative: Has nasal packing and now has fever > 100.4 F (38.0 C)    Negative: Patient wants to be seen    Negative: Has nasal packing (inserted by health care provider to control bleeding)    Negative: Hard-to-stop nosebleeds are a chronic symptom (recurrent or ongoing AND lasting > 4 weeks)    Negative: Easy bleeding  present in other family members    Negative: Bleeding present < 30 minutes and using correct method of direct pressure    Negative: Mild-moderate nosebleed and bleeding has stopped now    Protocols used: NOSEBLEED-A-OH

## 2022-03-18 ENCOUNTER — MYC REFILL (OUTPATIENT)
Dept: FAMILY MEDICINE | Facility: CLINIC | Age: 32
End: 2022-03-18
Payer: COMMERCIAL

## 2022-03-18 DIAGNOSIS — F98.8 ATTENTION DEFICIT DISORDER (ADD) WITHOUT HYPERACTIVITY: ICD-10-CM

## 2022-03-18 RX ORDER — DEXTROAMPHETAMINE SACCHARATE, AMPHETAMINE ASPARTATE, DEXTROAMPHETAMINE SULFATE AND AMPHETAMINE SULFATE 2.5; 2.5; 2.5; 2.5 MG/1; MG/1; MG/1; MG/1
10 TABLET ORAL 2 TIMES DAILY
Qty: 60 TABLET | Refills: 0 | Status: SHIPPED | OUTPATIENT
Start: 2022-03-18 | End: 2022-04-18

## 2022-03-18 NOTE — TELEPHONE ENCOUNTER
Routing refill request to provider for review/approval because:  Controlled substance request    Last Written Prescription Date:  2/15/22  Last Fill Quantity: 60,  # refills: 0   Last office visit provider:  5/13/21     Requested Prescriptions   Pending Prescriptions Disp Refills     amphetamine-dextroamphetamine (ADDERALL) 10 MG tablet 60 tablet 0     Sig: Take 1 tablet (10 mg) by mouth 2 times daily Take 1 tablet by mouth       There is no refill protocol information for this order          Pedro Enciso RN 03/18/22 1:16 PM

## 2022-03-18 NOTE — TELEPHONE ENCOUNTER
Incoming call from patient following up on refill request. Pt has 1 tablet left. Pt is hoping to get rx sent to pharmacy before the weekend. Pt would like a call back to let her know; OK to leave detailed message.

## 2022-03-18 NOTE — TELEPHONE ENCOUNTER
Notes from 05/13/21:    Attention deficit disorder (ADD) without hyperactivity  -     Drug Abuse 1+, Urine  Controlled substance agreement broken-Adderall 10 mg bid, 60 per month, urine tox and CSA 5-13-21    Today did a urine tox and controlled substance agreement for Adderall 10 mg twice a day max of 60 per 30 days.       Patient has appt scheduled on 05/26/22

## 2022-04-18 ENCOUNTER — MYC REFILL (OUTPATIENT)
Dept: FAMILY MEDICINE | Facility: CLINIC | Age: 32
End: 2022-04-18
Payer: COMMERCIAL

## 2022-04-18 DIAGNOSIS — F98.8 ATTENTION DEFICIT DISORDER (ADD) WITHOUT HYPERACTIVITY: ICD-10-CM

## 2022-04-20 RX ORDER — DEXTROAMPHETAMINE SACCHARATE, AMPHETAMINE ASPARTATE, DEXTROAMPHETAMINE SULFATE AND AMPHETAMINE SULFATE 2.5; 2.5; 2.5; 2.5 MG/1; MG/1; MG/1; MG/1
10 TABLET ORAL 2 TIMES DAILY
Qty: 60 TABLET | Refills: 0 | Status: SHIPPED | OUTPATIENT
Start: 2022-04-20 | End: 2022-05-26

## 2022-04-20 NOTE — TELEPHONE ENCOUNTER
Routing refill request to provider for review/approval because:  Controlled substance request    Last Written Prescription Date:  3/18/22  Last Fill Quantity: 60,  # refills: 0   Last office visit provider:  5/13/21     Requested Prescriptions   Pending Prescriptions Disp Refills     amphetamine-dextroamphetamine (ADDERALL) 10 MG tablet 60 tablet 0     Sig: Take 1 tablet (10 mg) by mouth 2 times daily Take 1 tablet by mouth       There is no refill protocol information for this order          Pedro Enciso RN 04/20/22 8:01 AM

## 2022-05-26 ENCOUNTER — OFFICE VISIT (OUTPATIENT)
Dept: FAMILY MEDICINE | Facility: CLINIC | Age: 32
End: 2022-05-26
Payer: COMMERCIAL

## 2022-05-26 DIAGNOSIS — Z00.00 ENCOUNTER FOR PREVENTATIVE ADULT HEALTH CARE EXAMINATION: Primary | ICD-10-CM

## 2022-05-26 DIAGNOSIS — Z79.899 ENCOUNTER FOR LONG-TERM (CURRENT) USE OF MEDICATIONS: ICD-10-CM

## 2022-05-26 DIAGNOSIS — F98.8 ATTENTION DEFICIT DISORDER (ADD) WITHOUT HYPERACTIVITY: ICD-10-CM

## 2022-05-26 DIAGNOSIS — R04.0 EPISTAXIS: ICD-10-CM

## 2022-05-26 DIAGNOSIS — F33.1 MODERATE EPISODE OF RECURRENT MAJOR DEPRESSIVE DISORDER (H): ICD-10-CM

## 2022-05-26 DIAGNOSIS — R35.0 URINARY FREQUENCY: ICD-10-CM

## 2022-05-26 DIAGNOSIS — Z79.899 CONTROLLED SUBSTANCE AGREEMENT SIGNED: ICD-10-CM

## 2022-05-26 PROBLEM — Z91.148 CONTROLLED SUBSTANCE AGREEMENT BROKEN: Status: ACTIVE | Noted: 2021-05-13

## 2022-05-26 PROBLEM — Z91.148 CONTROLLED SUBSTANCE AGREEMENT BROKEN: Status: RESOLVED | Noted: 2021-05-13 | Resolved: 2022-05-26

## 2022-05-26 LAB
ALBUMIN SERPL-MCNC: 4.3 G/DL (ref 3.5–5)
ALBUMIN UR-MCNC: NEGATIVE MG/DL
ALP SERPL-CCNC: 52 U/L (ref 45–120)
ALT SERPL W P-5'-P-CCNC: <9 U/L (ref 0–45)
ANION GAP SERPL CALCULATED.3IONS-SCNC: 10 MMOL/L (ref 5–18)
APPEARANCE UR: CLEAR
AST SERPL W P-5'-P-CCNC: 12 U/L (ref 0–40)
BILIRUB SERPL-MCNC: 0.8 MG/DL (ref 0–1)
BILIRUB UR QL STRIP: NEGATIVE
BUN SERPL-MCNC: 11 MG/DL (ref 8–22)
CALCIUM SERPL-MCNC: 9.7 MG/DL (ref 8.5–10.5)
CHLORIDE BLD-SCNC: 100 MMOL/L (ref 98–107)
CHOLEST SERPL-MCNC: 233 MG/DL
CO2 SERPL-SCNC: 27 MMOL/L (ref 22–31)
COLOR UR AUTO: YELLOW
CREAT SERPL-MCNC: 0.78 MG/DL (ref 0.6–1.1)
CREAT UR-MCNC: 138 MG/DL
ERYTHROCYTE [DISTWIDTH] IN BLOOD BY AUTOMATED COUNT: 12.1 % (ref 10–15)
FASTING STATUS PATIENT QL REPORTED: NO
GFR SERPL CREATININE-BSD FRML MDRD: >90 ML/MIN/1.73M2
GLUCOSE BLD-MCNC: 83 MG/DL (ref 70–125)
GLUCOSE UR STRIP-MCNC: NEGATIVE MG/DL
HCT VFR BLD AUTO: 41 % (ref 35–47)
HDLC SERPL-MCNC: 51 MG/DL
HGB BLD-MCNC: 13.6 G/DL (ref 11.7–15.7)
HGB UR QL STRIP: NEGATIVE
KETONES UR STRIP-MCNC: NEGATIVE MG/DL
LDLC SERPL CALC-MCNC: 161 MG/DL
LEUKOCYTE ESTERASE UR QL STRIP: NEGATIVE
MCH RBC QN AUTO: 29.4 PG (ref 26.5–33)
MCHC RBC AUTO-ENTMCNC: 33.2 G/DL (ref 31.5–36.5)
MCV RBC AUTO: 89 FL (ref 78–100)
NITRATE UR QL: NEGATIVE
PH UR STRIP: 7 [PH] (ref 5–8)
PLATELET # BLD AUTO: 307 10E3/UL (ref 150–450)
POTASSIUM BLD-SCNC: 4.2 MMOL/L (ref 3.5–5)
PROT SERPL-MCNC: 7.6 G/DL (ref 6–8)
RBC # BLD AUTO: 4.62 10E6/UL (ref 3.8–5.2)
SODIUM SERPL-SCNC: 137 MMOL/L (ref 136–145)
SP GR UR STRIP: 1.02 (ref 1–1.03)
TRIGL SERPL-MCNC: 107 MG/DL
TSH SERPL DL<=0.005 MIU/L-ACNC: 1.93 UIU/ML (ref 0.3–5)
UROBILINOGEN UR STRIP-ACNC: 0.2 E.U./DL
WBC # BLD AUTO: 8.4 10E3/UL (ref 4–11)

## 2022-05-26 PROCEDURE — 36415 COLL VENOUS BLD VENIPUNCTURE: CPT | Performed by: FAMILY MEDICINE

## 2022-05-26 PROCEDURE — G0123 SCREEN CERV/VAG THIN LAYER: HCPCS | Performed by: FAMILY MEDICINE

## 2022-05-26 PROCEDURE — 87624 HPV HI-RISK TYP POOLED RSLT: CPT | Performed by: FAMILY MEDICINE

## 2022-05-26 PROCEDURE — 80061 LIPID PANEL: CPT | Performed by: FAMILY MEDICINE

## 2022-05-26 PROCEDURE — 81003 URINALYSIS AUTO W/O SCOPE: CPT | Performed by: FAMILY MEDICINE

## 2022-05-26 PROCEDURE — 80053 COMPREHEN METABOLIC PANEL: CPT | Performed by: FAMILY MEDICINE

## 2022-05-26 PROCEDURE — 80307 DRUG TEST PRSMV CHEM ANLYZR: CPT | Performed by: FAMILY MEDICINE

## 2022-05-26 PROCEDURE — 84443 ASSAY THYROID STIM HORMONE: CPT | Performed by: FAMILY MEDICINE

## 2022-05-26 PROCEDURE — 99395 PREV VISIT EST AGE 18-39: CPT | Performed by: FAMILY MEDICINE

## 2022-05-26 PROCEDURE — 82306 VITAMIN D 25 HYDROXY: CPT | Performed by: FAMILY MEDICINE

## 2022-05-26 PROCEDURE — 85027 COMPLETE CBC AUTOMATED: CPT | Performed by: FAMILY MEDICINE

## 2022-05-26 PROCEDURE — 99214 OFFICE O/P EST MOD 30 MIN: CPT | Mod: 25 | Performed by: FAMILY MEDICINE

## 2022-05-26 RX ORDER — DEXTROAMPHETAMINE SACCHARATE, AMPHETAMINE ASPARTATE, DEXTROAMPHETAMINE SULFATE AND AMPHETAMINE SULFATE 2.5; 2.5; 2.5; 2.5 MG/1; MG/1; MG/1; MG/1
10 TABLET ORAL 2 TIMES DAILY
Qty: 180 TABLET | Refills: 0 | Status: SHIPPED | OUTPATIENT
Start: 2022-05-26 | End: 2022-08-25

## 2022-05-26 ASSESSMENT — ENCOUNTER SYMPTOMS
HEMATURIA: 0
HEARTBURN: 0
DIARRHEA: 0
FEVER: 0
COUGH: 0
NAUSEA: 0
EYE PAIN: 0
SORE THROAT: 0
MYALGIAS: 0
BREAST MASS: 0
CONSTIPATION: 0
HEMATOCHEZIA: 0
PARESTHESIAS: 1
WEAKNESS: 0
SHORTNESS OF BREATH: 0
DIZZINESS: 0
FREQUENCY: 1
HEADACHES: 0
DYSURIA: 0
ABDOMINAL PAIN: 0
PALPITATIONS: 0
ARTHRALGIAS: 1
JOINT SWELLING: 0
CHILLS: 0
NERVOUS/ANXIOUS: 1

## 2022-05-26 ASSESSMENT — ANXIETY QUESTIONNAIRES
5. BEING SO RESTLESS THAT IT IS HARD TO SIT STILL: NOT AT ALL
4. TROUBLE RELAXING: SEVERAL DAYS
7. FEELING AFRAID AS IF SOMETHING AWFUL MIGHT HAPPEN: SEVERAL DAYS
GAD7 TOTAL SCORE: 9
GAD7 TOTAL SCORE: 9
8. IF YOU CHECKED OFF ANY PROBLEMS, HOW DIFFICULT HAVE THESE MADE IT FOR YOU TO DO YOUR WORK, TAKE CARE OF THINGS AT HOME, OR GET ALONG WITH OTHER PEOPLE?: SOMEWHAT DIFFICULT
GAD7 TOTAL SCORE: 9
1. FEELING NERVOUS, ANXIOUS, OR ON EDGE: MORE THAN HALF THE DAYS
3. WORRYING TOO MUCH ABOUT DIFFERENT THINGS: MORE THAN HALF THE DAYS
2. NOT BEING ABLE TO STOP OR CONTROL WORRYING: SEVERAL DAYS
6. BECOMING EASILY ANNOYED OR IRRITABLE: MORE THAN HALF THE DAYS
7. FEELING AFRAID AS IF SOMETHING AWFUL MIGHT HAPPEN: SEVERAL DAYS

## 2022-05-26 ASSESSMENT — PATIENT HEALTH QUESTIONNAIRE - PHQ9
SUM OF ALL RESPONSES TO PHQ QUESTIONS 1-9: 3
SUM OF ALL RESPONSES TO PHQ QUESTIONS 1-9: 3
10. IF YOU CHECKED OFF ANY PROBLEMS, HOW DIFFICULT HAVE THESE PROBLEMS MADE IT FOR YOU TO DO YOUR WORK, TAKE CARE OF THINGS AT HOME, OR GET ALONG WITH OTHER PEOPLE: SOMEWHAT DIFFICULT

## 2022-05-26 NOTE — LETTER
My Depression Action Plan  Name: Tracey Crawford   Date of Birth 1990  Date: 5/26/2022    My doctor: Alecia Esteban   My clinic: [address] [facility] [id number] [phone]          GREEN    ZONE   Good Control    What it looks like:     Things are going generally well. You have normal ups and downs. You may even feel depressed from time to time, but bad moods usually last less than a day.   What you need to do:  1. Continue to care for yourself (see self care plan)  2. Check your depression survival kit and update it as needed  3. Follow your physician s recommendations including any medication.  4. Do not stop taking medication unless you consult with your physician first.           YELLOW         ZONE Getting Worse    What it looks like:     Depression is starting to interfere with your life.     It may be hard to get out of bed; you may be starting to isolate yourself from others.    Symptoms of depression are starting to last most all day and this has happened for several days.     You may have suicidal thoughts but they are not constant.   What you need to do:     1. Call your care team. Your response to treatment will improve if you keep your care team informed of your progress. Yellow periods are signs an adjustment may need to be made.     2. Continue your self-care.  Just get dressed and ready for the day.  Don't give yourself time to talk yourself out of it.    3. Talk to someone in your support network.    4. Open up your Depression Self-Care Plan/Wellness Kit.           RED    ZONE Medical Alert - Get Help    What it looks like:     Depression is seriously interfering with your life.     You may experience these or other symptoms: You can t get out of bed most days, can t work or engage in other necessary activities, you have trouble taking care of basic hygiene, or basic responsibilities, thoughts of suicide or death that  will not go away, self-injurious behavior.     What you need to do:  1. Call your care team and request a same-day appointment. If they are not available (weekends or after hours) call your local crisis line, emergency room or 911.          Depression Self-Care Plan / Wellness Kit    Many people find that medication and therapy are helpful treatments for managing depression. In addition, making small changes to your everyday life can help to boost your mood and improve your wellbeing. Below are some tips for you to consider. Be sure to talk with your medical provider and/or behavioral health consultant if your symptoms are worsening or not improving.     Sleep   Sleep hygiene  means all of the habits that support good, restful sleep. It includes maintaining a consistent bedtime and wake time, using your bedroom only for sleeping or sex, and keeping the bedroom dark and free of distractions like a computer, smartphone, or television.     Develop a Healthy Routine  Maintain good hygiene. Get out of bed in the morning, make your bed, brush your teeth, take a shower, and get dressed. Don t spend too much time viewing media that makes you feel stressed. Find time to relax each day.    Exercise  Get some form of exercise every day. This will help reduce pain and release endorphins, the  feel good  chemicals in your brain. It can be as simple as just going for a walk or doing some gardening, anything that will get you moving.      Diet  Strive to eat healthy foods, including fruits and vegetables. Drink plenty of water. Avoid excessive sugar, caffeine, alcohol, and other mood-altering substances.     Stay Connected with Others  Stay in touch with friends and family members.    Manage Your Mood  Try deep breathing, massage therapy, biofeedback, or meditation. Take part in fun activities when you can. Try to find something to smile about each day.     Psychotherapy  Be open to working with a therapist if your provider  recommends it.     Medication  Be sure to take your medication as prescribed. Most anti-depressants need to be taken every day. It usually takes several weeks for medications to work. Not all medicines work for all people. It is important to follow-up with your provider to make sure you have a treatment plan that is working for you. Do not stop your medication abruptly without first discussing it with your provider.    Crisis Resources   These hotlines are for both adults and children. They and are open 24 hours a day, 7 days a week unless noted otherwise.      National Suicide Prevention Lifeline   8-229-228-HCQJ (2561)      Crisis Text Line    www.crisistextline.org  Text HOME to 760708 from anywhere in the [place], anytime, about any type of crisis. A live, trained crisis counselor will receive the text and respond quickly.      [place] Lifeline for LGBTQ Youth  A national crisis intervention and suicide lifeline for LGBTQ youth under 25. Provides a safe place to talk without judgement. Call 1-326.739.1806; text START to 325144 or visit www.thetrevorproject.org to talk to a trained counselor.      For [facility] crisis numbers, visit the [facility] website at:  https://[place].gov/dhs/people-we-serve/adults/health-care/mental-health/resources/crisis-contacts.jsp

## 2022-05-26 NOTE — LETTER
[facility]  05/26/22  Patient: Tracey Crawford  YOB: 1990  Medical Record Number: [id number]                                                                                  Non-Opioid Controlled Substance Agreement    This is an agreement between you and your provider regarding safe and appropriate use of controlled substances prescribed by your care team. Controlled substances are?medicines that can cause physical and mental dependence (abuse).     There are strict laws about having and using these medicines. We here at [facility] are  committed to working with you in your efforts to get better. To support you in this work, we'll help you schedule regular office appointments for medicine refills. If we must cancel or change your appointment for any reason, we'll make sure you have enough medicine to last until your next appointment.     As a Provider, I will:     Listen carefully to your concerns while treating you with respect.     Recommend a treatment plan that I believe is in your best interest and may involve therapies other than medicine.      Talk with you often about the possible benefits and the risk of harm of any medicine that we prescribe for you.    Assess the safety of this medicine and check how well it works.      Provide a plan on how to taper (discontinue or go off) using this medicine if the decision is made to stop its use.      ::  As a Patient, I understand controlled substances:       Are prescribed by my care provider to help me function or work and manage my condition(s).?    Are strong medicines and can cause serious side effects.       Need to be taken exactly as prescribed.?Combining controlled substances with certain medicines or chemicals (such as illegal drugs, alcohol, sedatives, sleeping pills, and benzodiazepines) can be dangerous or even fatal.? If I stop taking my medicines suddenly, I may have severe withdrawal symptoms.     The  risks, benefits, and side effects of these medicine(s) were explained to me. I agree that:    1. I will take part in other treatments as advised by my care team. This may be psychiatry or counseling, physical therapy, behavioral therapy, group treatment or a referral to specialist.    2. I will keep all my appointments and understand this is part of the monitoring of controlled substances.?My care team may require an office visit for EVERY controlled substance refill. If I miss appointments or don t follow instructions, my care team may stop my medicine    3. I will take my medicines as prescribed. I will not change the dose or schedule unless my care team tells me to. There will be no refills if I run out early.      4. I may be asked to come to the clinic and complete a urine drug test or complete a pill count. If I don t give a urine sample or participate in a pill count, the care team may stop my medicine.    5. I will only receive controlled substance prescriptions from this clinic. If I am treated by another provider, I will tell them that I am taking controlled substances and that I have a treatment agreement with this provider. I will inform my [facility] care team within one business day if I am given a prescription for any controlled substance by another healthcare provider. My [facility] care team can contact other providers and pharmacists about my use of any medicines.    6. It is up to me to make sure that I don't run out of my medicines on weekends or holidays.?If my care team is willing to refill my prescription without a visit, I must request refills only during office hours. Refills may take up to 3 business days to process. I will use one pharmacy to fill all my controlled substance prescriptions. I will notify the clinic about any changes to my insurance or medicine availability.    7. I am responsible for my prescriptions. If the medicine/prescription is lost, stolen or destroyed,  it will not be replaced.?I also agree not to share controlled substance medicines with anyone.     8. I am aware I should not use any illegal or recreational drugs. I agree not to drink alcohol unless my care team says I can.     9. If I enroll in the [place] Medical Cannabis program, I will tell my care team before my next refill.    10. I will tell my care team right away if I become pregnant, have a new medical problem treated outside of my regular clinic, or have a change in my medicines.     11. I understand that this medicine can affect my thinking, judgment and reaction time.? Alcohol and drugs affect the brain and body, which can affect the safety of my driving. Being under the influence of alcohol or drugs can affect my decision-making, behaviors, personal safety and the safety of others. Driving while impaired (DWI) can occur if a person is driving, operating or in physical control of a car, motorcycle, boat, snowmobile, ATV, motorbike, off-road vehicle or any other motor vehicle ([place] Statute 169A.20). I understand the risk if I choose to drive or operate any vehicle or machinery.    I understand that if I do not follow any of the conditions above, my prescriptions or treatment may be stopped or changed.   I agree that my provider, clinic care team and pharmacy may work with any city, state or federal law enforcement agency that investigates the misuse, sale or other diversion of my controlled medicine. I will allow my provider to discuss my care with, or share a copy of, this agreement with any other treating provider, pharmacy or emergency room where I receive care.     I have read this agreement and have asked questions about anything I did not understand.    ________________________________________________________  Patient Signature - Tracey Crawford     ___________________                   Date     ________________________________________________________  Provider Signature - Alecia Esteban MD        ___________________                   Date     ________________________________________________________  Witness Signature (required if provider not present while patient signing)          ___________________                   Date

## 2022-05-26 NOTE — PROGRESS NOTES
SUBJECTIVE:   CC: Tracey Crawford is an 32 year old woman who presents for preventive health visit.       Patient has been advised of split billing requirements and indicates understanding: Yes  Healthy Habits:     Getting at least 3 servings of Calcium per day:  Yes    Bi-annual eye exam:  NO    Dental care twice a year:  NO    Sleep apnea or symptoms of sleep apnea:  Daytime drowsiness    Diet:  Regular (no restrictions)    Duration of exercise:  Other    Taking medications regularly:  Yes    Medication side effects:  None    PHQ-2 Total Score: 2    Additional concerns today:  Yes  UTI  Associated symptoms include arthralgias and a rash. Pertinent negatives include no abdominal pain, chest pain, chills, congestion, coughing, fever, headaches, joint swelling, myalgias, nausea, sore throat or weakness.     ADHD:  Controlled on medications. No side effects.    Mood:  Busy time for her.  Lots of birthdays in the families.  End of school year.    Gets overwhwmmed and stressed.  Good med combo.  When she started the Wellbutrin felt hyper.  Not having side effect now.  Told bu a pharmacit ok to be on with Adderall.      Broncitiis 5 years ago.  Uses Albuterol inhaler rarely, every couple months.    Urinary frequency and urgency for a couple of months.  No dysuria.  Her mom has frequent UTI. Wants to ensure no UTI. Occasional stress incontinence.  No urge incontinence.  1 cup coffee a day.  3 glasses water a day.    Blood noses:  Once she gets one will get them for a few days.  Will rinse with water.  Thinks is more on the right then the left.  Happens every other week.    Acne:  Neck breaks out at times of stress.  No new contacts.      Health Maintenance   Topic Date Due     ANNUAL REVIEW OF HM ORDERS  Today     HEPATITIS C SCREENING  NA     PREVENTIVE CARE VISIT  Today     ADVANCE CARE PLANNING  Declined     COVID-19 Vaccine (3 - Booster for Pfizer series) Booster when wish     INFLUENZA VACCINE (Season Ended)  09/01/2022     PHQ-9  Today     HPV TEST  Today     PAP  Today     DTAP/TDAP/TD IMMUNIZATION (9 - Td or Tdap) 03/07/2029     HIV SCREENING  Completed     DEPRESSION ACTION PLAN  Completed     IPV IMMUNIZATION  Completed     HEPATITIS B IMMUNIZATION  Completed     Pneumococcal Vaccine: Pediatrics (0 to 5 Years) and At-Risk Patients (6 to 64 Years)  Age 65     MENINGITIS IMMUNIZATION  Aged Out                 Today's PHQ-2 Score:   PHQ-2 ( 1999 Pfizer) 5/26/2022   Q1: Little interest or pleasure in doing things 1   Q2: Feeling down, depressed or hopeless 1   PHQ-2 Score 2   Q1: Little interest or pleasure in doing things Several days   Q2: Feeling down, depressed or hopeless Several days   PHQ-2 Score 2       Abuse: Current or Past (Physical, Sexual or Emotional) - No  Do you feel safe in your environment? Yes    Have you ever done Advance Care Planning? (For example, a Health Directive, POLST, or a discussion with a medical provider or your loved ones about your wishes): No, advance care planning information given to patient to review.  Patient plans to discuss their wishes with loved ones or provider.      Social History     Tobacco Use     Smoking status: Never Smoker     Smokeless tobacco: Never Used   Substance Use Topics     Alcohol use: Not on file         Alcohol Use 5/26/2022   Prescreen: >3 drinks/day or >7 drinks/week? No       Reviewed orders with patient.  Reviewed health maintenance and updated orders accordingly - Yes  Lab work is in process    Breast Cancer Screening:    Breast CA Risk Assessment (FHS-7) 5/26/2022   Do you have a family history of breast, colon, or ovarian cancer? No / Unknown           Pertinent mammograms are reviewed under the imaging tab.    History of abnormal Pap smear: NO - age 30- 65 PAP every 3 years recommended  PAP / HPV Latest Ref Rng & Units 7/22/2019 6/7/2017 9/26/2016   PAP Negative for squamous intraepithelial lesion or malignancy. Negative for squamous intraepithelial  lesion or malignancy  Electronically signed by Krista Worley CT (ASCP) on 7/29/2019 at  1:51 PM   Negative for squamous intraepithelial lesion or malignancy  Electronically signed by Kimberly Hurst CT (ASCP) on 6/14/2017 at  3:27 PM   Negative for squamous intraepithelial lesion or malignancy  Electronically signed by Kimberly Hurst CT (ASCP) on 10/3/2016 at 12:07 PM     HPV16 NEG Negative - -   HPV18 NEG Negative - -   HRHPV NEG Negative - -     Reviewed and updated as needed this visit by clinical staff     Meds                Reviewed and updated as needed this visit by Provider                       Review of Systems   Constitutional: Negative for chills and fever.   HENT: Negative for congestion, ear pain, hearing loss and sore throat.    Eyes: Negative for pain and visual disturbance.   Respiratory: Negative for cough and shortness of breath.    Cardiovascular: Negative for chest pain, palpitations and peripheral edema.   Gastrointestinal: Negative for abdominal pain, constipation, diarrhea, heartburn, hematochezia and nausea.   Breasts:  Negative for tenderness, breast mass and discharge.   Genitourinary: Positive for frequency and urgency. Negative for dysuria, genital sores, hematuria, pelvic pain, vaginal bleeding and vaginal discharge.   Musculoskeletal: Positive for arthralgias. Negative for joint swelling and myalgias.   Skin: Positive for rash.   Neurological: Positive for paresthesias. Negative for dizziness, weakness and headaches.   Psychiatric/Behavioral: Positive for mood changes. The patient is nervous/anxious.      CONSTITUTIONAL: NEGATIVE for fever, chills, change in weight  INTEGUMENTARU/SKIN: NEGATIVE for worrisome rashes, moles or lesions  EYES: NEGATIVE for vision changes or irritation  ENT: NEGATIVE for ear, mouth and throat problems  RESP: NEGATIVE for significant cough or SOB  BREAST: NEGATIVE for masses, tenderness or discharge  CV: NEGATIVE for chest pain,  palpitations or peripheral edema  GI: NEGATIVE for nausea, abdominal pain, heartburn, or change in bowel habits  : NEGATIVE for unusual urinary or vaginal symptoms. Periods are regular.  MUSCULOSKELETAL: NEGATIVE for significant arthralgias or myalgia  NEURO: NEGATIVE for weakness, dizziness or paresthesias  PSYCHIATRIC: NEGATIVE for changes in mood or affect     OBJECTIVE:   There were no vitals taken for this visit.  Physical Exam  GENERAL: healthy, alert and no distress  EYES: Eyes grossly normal to inspection, PERRL and conjunctivae and sclerae normal  HENT: ear canals and TM's normal, nose and mouth without ulcers or lesions  NECK: no adenopathy, no asymmetry, masses, or scars and thyroid normal to palpation  RESP: lungs clear to auscultation - no rales, rhonchi or wheezes  BREAST: normal without masses, tenderness or nipple discharge and no palpable axillary masses or adenopathy  CV: regular rate and rhythm, normal S1 S2, no S3 or S4, no murmur, click or rub, no peripheral edema and peripheral pulses strong  ABDOMEN: soft, nontender, no hepatosplenomegaly, no masses and bowel sounds normal   (female): normal female external genitalia, normal urethral meatus, vaginal mucosa pink, moist, well rugated, and normal cervix/adnexa/uterus without masses or discharge  MS: no gross musculoskeletal defects noted, no edema  SKIN: no suspicious lesions or rashes  NEURO: Normal strength and tone, mentation intact and speech normal  PSYCH: mentation appears normal, affect normal/bright    Diagnostic Test Results:  Labs reviewed in Epic    ASSESSMENT/PLAN:       ICD-10-CM    1. Encounter for preventative adult health care examination  Z00.00 Lipid Profile     Pap screen with HPV - recommended age 30 - 65 years     Lipid Profile     HPV High Risk Types DNA Cervical   2. Attention deficit disorder (ADD) without hyperactivity  F98.8 amphetamine-dextroamphetamine (ADDERALL) 10 MG tablet   3. Encounter for long-term (current)  "use of medications  Z79.899 [id number] - Urine Drug Confirmation Panel (Comprehensive)     [id number] - Urine Drug Confirmation Panel (Comprehensive)   4. Urinary frequency  R35.0 UA macro with reflex to Microscopic and Culture - Clinc Collect   5. Epistaxis  R04.0 Comprehensive metabolic panel     CBC with platelets     Adult ENT  Referral     Comprehensive metabolic panel     CBC with platelets   6. Controlled substance agreement signed-SCA and urine tox 5-26-22  Z79.899    7. Moderate episode of recurrent major depressive disorder (H)  F33.1 Vitamin D Deficiency     TSH with free T4 reflex     Vitamin D Deficiency     TSH with free T4 reflex     Covid booster when wishes.    Did controlled substance agreement and urine tox for Adderall 10 mg 2 times a day.    Set office visit in 6 months for med check.  Will try for 3 month supply.    On medications for depression and anxiety.    Seeing a counselor through work.    UA today negative.    Vaseline to nostrils at bedtime.    ENT consult given., please call [phone].    Restart the Clindamycin lotion from Derm, daily to 2 times a day.    Derm follow-up for acne if needed, likely due for a full body skin check. And you can ask about ingrown toenails.    Patient has been advised of split billing requirements and indicates understanding: Yes    COUNSELING:  Reviewed preventive health counseling, as reflected in patient instructions       Regular exercise       Healthy diet/nutrition    Estimated body mass index is 26.85 kg/m  as calculated from the following:    Height as of 2/26/20: 1.626 m (5' 4\").    Weight as of 6/8/21: 70.9 kg (156 lb 6.4 oz).        She reports that she has never smoked. She has never used smokeless tobacco.      Counseling Resources:  ATP IV Guidelines  Pooled Cohorts Equation Calculator  Breast Cancer Risk Calculator  BRCA-Related Cancer Risk Assessment: FHS-7 Tool  FRAX Risk Assessment  ICSI Preventive Guidelines  Dietary Guidelines for " Americans, 2010  USDA's MyPlate  ASA Prophylaxis  Lung CA Screening    Alecia Esteban MD  [facility]  Answers for HPI/ROS submitted by the patient on 5/26/2022  If you checked off any problems, how difficult have these problems made it for you to do your work, take care of things at home, or get along with other people?: Somewhat difficult  PHQ9 TOTAL SCORE: 3  LOGAN 7 TOTAL SCORE: 9

## 2022-05-26 NOTE — PROGRESS NOTES
{PROVIDER CHARTING PREFERENCE:810052}    Shasha Webb is a 32 year old who presents for the following health issues {ACCOMPANIED BY STATEMENT (Optional):192840}    UTI  Associated symptoms include arthralgias and a rash. Pertinent negatives include no abdominal pain, chest pain, chills, congestion, coughing, fever, headaches, joint swelling, myalgias, nausea, sore throat or weakness.   Healthy Habits:     Getting at least 3 servings of Calcium per day:  Yes    Bi-annual eye exam:  NO    Dental care twice a year:  NO    Sleep apnea or symptoms of sleep apnea:  Daytime drowsiness    Diet:  Regular (no restrictions)    Duration of exercise:  Other    Taking medications regularly:  Yes    Medication side effects:  None    PHQ-2 Total Score: 2    Additional concerns today:  Yes       Depression and Anxiety Follow-Up    How are you doing with your depression since your last visit? No change    How are you doing with your anxiety since your last visit?  No change    Are you having other symptoms that might be associated with depression or anxiety? { :491691}    Have you had a significant life event? No     Do you have any concerns with your use of alcohol or other drugs? No    Social History     Tobacco Use     Smoking status: Never Smoker     Smokeless tobacco: Never Used     PHQ 8/27/2020 5/13/2021 5/26/2022   PHQ-9 Total Score 1 4 3   Q9: Thoughts of better off dead/self-harm past 2 weeks Not at all Not at all Not at all     LOGAN-7 SCORE 8/27/2020 5/13/2021 5/26/2022   Total Score - - 9 (mild anxiety)   Total Score 3 4 9     Last PHQ-9 5/26/2022   1.  Little interest or pleasure in doing things 1   2.  Feeling down, depressed, or hopeless 1   3.  Trouble falling or staying asleep, or sleeping too much 0   4.  Feeling tired or having little energy 0   5.  Poor appetite or overeating 0   6.  Feeling bad about yourself 1   7.  Trouble concentrating 0   8.  Moving slowly or restless 0   Q9: Thoughts of better off  dead/self-harm past 2 weeks 0   PHQ-9 Total Score 3   Difficulty at work, home, or with people -       Suicide Assessment Five-step Evaluation and Treatment (SAFE-T)  {Provider  Link to Depression Care Package SmartSet :847148}    How many servings of fruits and vegetables do you eat daily?  2-3    On average, how many sweetened beverages do you drink each day (Examples: soda, juice, sweet tea, etc.  Do NOT count diet or artificially sweetened beverages)?   1    How many days per week do you exercise enough to make your heart beat faster? 3 or less    How many minutes a day do you exercise enough to make your heart beat faster? 10 - 19    How many days per week do you miss taking your medication? 0    {additonal problems for provider to add (Optional):807430}    Review of Systems   Constitutional: Negative for chills and fever.   HENT: Negative for congestion, ear pain, hearing loss and sore throat.    Eyes: Negative for pain and visual disturbance.   Respiratory: Negative for cough and shortness of breath.    Cardiovascular: Negative for chest pain, palpitations and peripheral edema.   Gastrointestinal: Negative for abdominal pain, constipation, diarrhea, heartburn, hematochezia and nausea.   Breasts:  Negative for tenderness, breast mass and discharge.   Genitourinary: Positive for frequency and urgency. Negative for dysuria, genital sores, hematuria, pelvic pain, vaginal bleeding and vaginal discharge.   Musculoskeletal: Positive for arthralgias. Negative for joint swelling and myalgias.   Skin: Positive for rash.   Neurological: Positive for paresthesias. Negative for dizziness, weakness and headaches.   Psychiatric/Behavioral: Positive for mood changes. The patient is nervous/anxious.       {ROS COMP (Optional):009708}      Objective    There were no vitals taken for this visit.  There is no height or weight on file to calculate BMI.  Physical Exam   {Exam List (Optional):662287}    {Diagnostic Test Results  (Optional):938631}    {AMBULATORY ATTESTATION (Optional):848265}        Answers for HPI/ROS submitted by the patient on 5/26/2022  If you checked off any problems, how difficult have these problems made it for you to do your work, take care of things at home, or get along with other people?: Somewhat difficult  PHQ9 TOTAL SCORE: 3  LOGAN 7 TOTAL SCORE: 9

## 2022-05-26 NOTE — PATIENT INSTRUCTIONS
Covid booster when wishes.    Did controlled substance agreement and urine tox for Adderall 10 mg 2 times a day.    Set office visit in 6 months for med check.  Will try for 3 month supply.    On medications for depression and anxiety.    Seeing a counselor through work.    UA today negative.    Vaseline to nostrils at bedtime.    ENT consult given., please call [phone].    Restart the Clindamycin lotion from Derm, daily to 2 times a day.    Derm follow-up for acne if needed, likely due for a full body skin check. And you can ask about ingrown toenails.    Health Maintenance   Topic Date Due    ANNUAL REVIEW OF HM ORDERS  Today    HEPATITIS C SCREENING  NA    PREVENTIVE CARE VISIT  Today    ADVANCE CARE PLANNING  Declined    COVID-19 Vaccine (3 - Booster for Pfizer series) Booster when wish    INFLUENZA VACCINE (Season Ended) 09/01/2022    PHQ-9  Today    HPV TEST  Today    PAP  Today    DTAP/TDAP/TD IMMUNIZATION (9 - Td or Tdap) 03/07/2029    HIV SCREENING  Completed    DEPRESSION ACTION PLAN  Completed    IPV IMMUNIZATION  Completed    HEPATITIS B IMMUNIZATION  Completed    Pneumococcal Vaccine: Pediatrics (0 to 5 Years) and At-Risk Patients (6 to 64 Years)  Age 65    MENINGITIS IMMUNIZATION  Aged Out

## 2022-05-27 LAB — DEPRECATED CALCIDIOL+CALCIFEROL SERPL-MC: 28 UG/L (ref 20–75)

## 2022-05-31 PROBLEM — D22.9 ATYPICAL NEVI: Status: RESOLVED | Noted: 2019-02-20 | Resolved: 2022-05-31

## 2022-05-31 PROBLEM — D23.9 DYSPLASTIC NEVI: Status: ACTIVE | Noted: 2022-05-31

## 2022-05-31 LAB
HUMAN PAPILLOMA VIRUS 16 DNA: NEGATIVE
HUMAN PAPILLOMA VIRUS 18 DNA: NEGATIVE
HUMAN PAPILLOMA VIRUS FINAL DIAGNOSIS: NORMAL
HUMAN PAPILLOMA VIRUS OTHER HR: NEGATIVE

## 2022-06-01 LAB
AMPHET UR CFM-MCNC: 900 NG/ML
AMPHET/CREAT UR: 652 NG/MG {CREAT}

## 2022-06-06 LAB
BKR LAB AP GYN ADEQUACY: NORMAL
BKR LAB AP GYN INTERPRETATION: NORMAL
BKR LAB AP HPV REFLEX: NORMAL
BKR LAB AP PREVIOUS ABNORMAL: NORMAL
PATH REPORT.COMMENTS IMP SPEC: NORMAL
PATH REPORT.COMMENTS IMP SPEC: NORMAL
PATH REPORT.RELEVANT HX SPEC: NORMAL

## 2022-08-25 ENCOUNTER — MYC REFILL (OUTPATIENT)
Dept: FAMILY MEDICINE | Facility: CLINIC | Age: 32
End: 2022-08-25

## 2022-08-25 DIAGNOSIS — F98.8 ATTENTION DEFICIT DISORDER (ADD) WITHOUT HYPERACTIVITY: ICD-10-CM

## 2022-08-27 RX ORDER — DEXTROAMPHETAMINE SACCHARATE, AMPHETAMINE ASPARTATE, DEXTROAMPHETAMINE SULFATE AND AMPHETAMINE SULFATE 2.5; 2.5; 2.5; 2.5 MG/1; MG/1; MG/1; MG/1
10 TABLET ORAL 2 TIMES DAILY
Qty: 180 TABLET | Refills: 0 | Status: SHIPPED | OUTPATIENT
Start: 2022-08-27 | End: 2023-01-19

## 2022-09-11 ENCOUNTER — HEALTH MAINTENANCE LETTER (OUTPATIENT)
Age: 32
End: 2022-09-11

## 2022-10-11 ENCOUNTER — TRANSFERRED RECORDS (OUTPATIENT)
Dept: HEALTH INFORMATION MANAGEMENT | Facility: CLINIC | Age: 32
End: 2022-10-11

## 2022-10-21 DIAGNOSIS — F33.9 EPISODE OF RECURRENT MAJOR DEPRESSIVE DISORDER, UNSPECIFIED DEPRESSION EPISODE SEVERITY (H): ICD-10-CM

## 2022-10-24 RX ORDER — BUPROPION HYDROCHLORIDE 150 MG/1
TABLET ORAL
Qty: 90 TABLET | Refills: 0 | Status: SHIPPED | OUTPATIENT
Start: 2022-10-24 | End: 2023-01-20

## 2022-10-24 NOTE — TELEPHONE ENCOUNTER
"Last Written Prescription Date:  10/20/2021  Last Fill Quantity: 90,  # refills: 3   Last office visit provider:  05/26/2022     Requested Prescriptions   Pending Prescriptions Disp Refills     buPROPion (WELLBUTRIN XL) 150 MG 24 hr tablet [Pharmacy Med Name: BUPROPION HCL  MG TABLET] 90 tablet 3     Sig: TAKE 1 TABLET BY MOUTH EVERY DAY IN THE MORNING       SSRIs Protocol Passed - 10/21/2022 11:04 AM        Passed - PHQ-9 score less than 5 in past 6 months     Please review last PHQ-9 score.           Passed - Medication is Bupropion     If the medication is Bupropion (Wellbutrin), and the patient is taking for smoking cessation; OK to refill.          Passed - Medication is active on med list        Passed - Patient is age 18 or older        Passed - No active pregnancy on record        Passed - No positive pregnancy test in last 12 months        Passed - Recent (6 mo) or future (30 days) visit within the authorizing provider's specialty     Patient had office visit in the last 6 months or has a visit in the next 30 days with authorizing provider or within the authorizing provider's specialty.  See \"Patient Info\" tab in inbasket, or \"Choose Columns\" in Meds & Orders section of the refill encounter.                 Sussy Alex 10/24/22 9:34 AM  "

## 2022-11-03 NOTE — PROGRESS NOTES
I am seeing this patient in consultation for epistaxis at the request of the provider Alecia Pettit    Chief Complaint - Epistaxis    History of Present Illness - Tracey Crawford is a 32 year old female presents with approximately a couple years of epistaxis. The history is provided by the patient. It occurs on both sides, but right is worse. It usually only comes out the front of the nose, but it has ran down the back of the throat at times. The bleeding occurs approximately every other week. The patient can get the bleeding to stop by pressure. The patient has never gone to the emergency department. The patient has no personal or family history of bleeding disorders. The patient takes no blood-thinning medication. The patient has tried vaseline and aquaphor.     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     Jaw pain     Controlled substance agreement signed-[facility] and urine tox 5-26-22     Dysplastic nevi       Current Medications -   Current Outpatient Medications:      albuterol (PROAIR HFA/PROVENTIL HFA/VENTOLIN HFA) 108 (90 Base) MCG/ACT inhaler, Inhale 2 puffs into the lungs, Disp: , Rfl:      amphetamine-dextroamphetamine (ADDERALL) 10 MG tablet, Take 1 tablet (10 mg) by mouth 2 times daily Take 1 tablet by mouth, Disp: 180 tablet, Rfl: 0     buPROPion (WELLBUTRIN XL) 150 MG 24 hr tablet, TAKE 1 TABLET BY MOUTH EVERY DAY IN THE MORNING, Disp: 90 tablet, Rfl: 0     citalopram (CELEXA) 40 MG tablet, TAKE 1 TABLET BY MOUTH EVERY DAY, Disp: 90 tablet, Rfl: 1     Multiple Vitamins-Minerals (ONCOVITE) TABS, Take 1 tablet by mouth, Disp: ,  Rfl:     Allergies - No Known Allergies    Social History -   Social History     Socioeconomic History     Marital status:    Tobacco Use     Smoking status: Never Smoker     Smokeless tobacco: Never Used   Substance and Sexual Activity     Sexual activity: Not Currently       Family History -   Family History   Problem Relation Age of Onset     Depression Mother      Diabetes Father      Alcoholism Father         recovered     Hypertension Father      Depression Sister      Eczema Sister      Depression Brother      Hypertension Paternal Grandfather      Diabetes Paternal Grandfather      Cancer Paternal Grandfather         Prostate       Review of Systems - As per HPI and PMHx, otherwise 7 system review of the head and neck is negative.    Physical Exam  /73   Pulse 82   SpO2 97%   General - The patient is in no distress.  Alert and oriented x3, answers questions and cooperates with examination appropriately.   Voice and Breathing - The patient was breathing comfortably without the use of accessory muscles. There was no wheezing, stridor, or stertor.  The patients voice was clear and strong, with no dysphonia.  Head and Face - Normocephalic and atraumatic.    Eyes - Extraocular movements intact. Sclera were not icteric or injected, conjunctiva were pink and moist.  Neurologic - Cranial nerves II-XII are grossly intact. Specifically, the facial nerve is intact, House-Brackmann grade 1 of 6.   Nose - No significant external deformity. The nasal mucosa along the anterior septum on both sides shows a couple prominent blood vessels superficially located. The septum was midline, turbinates are of normal size and position.  No polyps, masses, or purulence.  Mouth - Examination of the oral cavity showed pink, healthy oral mucosa. No lesions or ulcerations noted.  The tongue was mobile and protrudes midline.   Oropharynx - The walls of the oropharynx were smooth, symmetric, and had no lesions or ulcerations.  The uvula was midline and the palate raised symmetrically. No blood.  Neck -  Soft, non-tender. Palpation of the occipital, submental, submandibular, internal jugular chain, and supraclavicular nodes did not demonstrate any abnormal lymph nodes or masses. The parotid glands were without masses. Palpation of the thyroid was soft and smooth, with no nodules or goiter appreciated.  The trachea was midline.      Procedure - I sprayed the anterior nasal cavity and septum on both sides with phenylephrine and lidocaine. I applied 1 stick of silver nitrate to the prominent blood vessels along the anterior septum on the superior septum on the right and the inferior anterior septum on the left. The two cauterized spots were not adjacent to one another. Hemostasis was achieved. I applied bacitracin ointment to the wound with a q-tip.    A/P - Tracey Crawford is a 32 year old female with epistaxis. This is almost certainly coming from the anterior septum on both sides. I cauterized both sides today. We discussed restrictions on manipulation and picking of the nose. I explained using aquaphor 2-3 times daily to the anterior septum.     I also explained applying digital pressure to the nasal tip for a minimum of 15-20 minutes to stop a nose bleed. If that doesn't stop the bleeding the patient needs to proceed to the nearest emergency department or return to ENT clinic.     Israel Orozco MD  Otolaryngology  [facility]

## 2022-11-04 ENCOUNTER — OFFICE VISIT (OUTPATIENT)
Dept: OTOLARYNGOLOGY | Facility: CLINIC | Age: 32
End: 2022-11-04
Attending: FAMILY MEDICINE
Payer: COMMERCIAL

## 2022-11-04 VITALS — HEART RATE: 82 BPM | DIASTOLIC BLOOD PRESSURE: 73 MMHG | OXYGEN SATURATION: 97 % | SYSTOLIC BLOOD PRESSURE: 110 MMHG

## 2022-11-04 DIAGNOSIS — R04.0 EPISTAXIS: ICD-10-CM

## 2022-11-04 PROCEDURE — 30901 CONTROL OF NOSEBLEED: CPT | Mod: 50 | Performed by: OTOLARYNGOLOGY

## 2022-11-04 ASSESSMENT — PAIN SCALES - GENERAL: PAINLEVEL: NO PAIN (0)

## 2022-11-04 NOTE — LETTER
11/4/2022         RE: Tracey Crawford  [id number] [facility] [address]        Dear Colleague,    Thank you for referring your patient, Tracey Crawford, to the [facility]. Please see a copy of my visit note below.    I am seeing this patient in consultation for epistaxis at the request of the provider Alecia Pettit    Chief Complaint - Epistaxis    History of Present Illness - Tracey Crawford is a 32 year old female presents with approximately a couple years of epistaxis. The history is provided by the patient. It occurs on both sides, but right is worse. It usually only comes out the front of the nose, but it has ran down the back of the throat at times. The bleeding occurs approximately every other week. The patient can get the bleeding to stop by pressure. The patient has never gone to the emergency department. The patient has no personal or family history of bleeding disorders. The patient takes no blood-thinning medication. The patient has tried vaseline and aquaphor.     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     Jaw pain     Controlled substance agreement signed-[facility]A and urine tox 5-26-22     Dysplastic nevi       Current Medications -   Current Outpatient Medications:      albuterol (PROAIR HFA/PROVENTIL HFA/VENTOLIN HFA) 108 (90 Base) MCG/ACT inhaler, Inhale 2 puffs into the lungs, Disp: , Rfl:      amphetamine-dextroamphetamine (ADDERALL) 10 MG tablet, Take 1 tablet (10 mg) by mouth 2 times daily Take 1 tablet by mouth, Disp: 180 tablet, Rfl: 0     buPROPion (WELLBUTRIN XL) 150 MG 24 hr tablet, TAKE 1  TABLET BY MOUTH EVERY DAY IN THE MORNING, Disp: 90 tablet, Rfl: 0     citalopram (CELEXA) 40 MG tablet, TAKE 1 TABLET BY MOUTH EVERY DAY, Disp: 90 tablet, Rfl: 1     Multiple Vitamins-Minerals (ONCOVITE) TABS, Take 1 tablet by mouth, Disp: , Rfl:     Allergies - No Known Allergies    Social History -   Social History     Socioeconomic History     Marital status:    Tobacco Use     Smoking status: Never Smoker     Smokeless tobacco: Never Used   Substance and Sexual Activity     Sexual activity: Not Currently       Family History -   Family History   Problem Relation Age of Onset     Depression Mother      Diabetes Father      Alcoholism Father         recovered     Hypertension Father      Depression Sister      Eczema Sister      Depression Brother      Hypertension Paternal Grandfather      Diabetes Paternal Grandfather      Cancer Paternal Grandfather         Prostate       Review of Systems - As per HPI and PMHx, otherwise 7 system review of the head and neck is negative.    Physical Exam  /73   Pulse 82   SpO2 97%   General - The patient is in no distress.  Alert and oriented x3, answers questions and cooperates with examination appropriately.   Voice and Breathing - The patient was breathing comfortably without the use of accessory muscles. There was no wheezing, stridor, or stertor.  The patients voice was clear and strong, with no dysphonia.  Head and Face - Normocephalic and atraumatic.    Eyes - Extraocular movements intact. Sclera were not icteric or injected, conjunctiva were pink and moist.  Neurologic - Cranial nerves II-XII are grossly intact. Specifically, the facial nerve is intact, House-Brackmann grade 1 of 6.   Nose - No significant external deformity. The nasal mucosa along the anterior septum on both sides shows a couple prominent blood vessels superficially located. The septum was midline, turbinates are of normal size and position.  No polyps, masses, or purulence.  Mouth -  Examination of the oral cavity showed pink, healthy oral mucosa. No lesions or ulcerations noted.  The tongue was mobile and protrudes midline.   Oropharynx - The walls of the oropharynx were smooth, symmetric, and had no lesions or ulcerations. The uvula was midline and the palate raised symmetrically. No blood.  Neck -  Soft, non-tender. Palpation of the occipital, submental, submandibular, internal jugular chain, and supraclavicular nodes did not demonstrate any abnormal lymph nodes or masses. The parotid glands were without masses. Palpation of the thyroid was soft and smooth, with no nodules or goiter appreciated.  The trachea was midline.      Procedure - I sprayed the anterior nasal cavity and septum on both sides with phenylephrine and lidocaine. I applied 1 stick of silver nitrate to the prominent blood vessels along the anterior septum on the superior septum on the right and the inferior anterior septum on the left. The two cauterized spots were not adjacent to one another. Hemostasis was achieved. I applied bacitracin ointment to the wound with a q-tip.    A/P - Tracey Crawford is a 32 year old female with epistaxis. This is almost certainly coming from the anterior septum on both sides. I cauterized both sides today. We discussed restrictions on manipulation and picking of the nose. I explained using aquaphor 2-3 times daily to the anterior septum.     I also explained applying digital pressure to the nasal tip for a minimum of 15-20 minutes to stop a nose bleed. If that doesn't stop the bleeding the patient needs to proceed to the nearest emergency department or return to ENT clinic.     Israel Orozco MD  Otolaryngology  [facility]        Again, thank you for allowing me to participate in the care of your patient.        Sincerely,        Israel Orozco MD

## 2023-01-19 ENCOUNTER — MYC REFILL (OUTPATIENT)
Dept: FAMILY MEDICINE | Facility: CLINIC | Age: 33
End: 2023-01-19
Payer: COMMERCIAL

## 2023-01-19 DIAGNOSIS — F98.8 ATTENTION DEFICIT DISORDER (ADD) WITHOUT HYPERACTIVITY: ICD-10-CM

## 2023-01-19 DIAGNOSIS — F33.9 EPISODE OF RECURRENT MAJOR DEPRESSIVE DISORDER, UNSPECIFIED DEPRESSION EPISODE SEVERITY (H): ICD-10-CM

## 2023-01-20 RX ORDER — CITALOPRAM HYDROBROMIDE 40 MG/1
TABLET ORAL
Qty: 90 TABLET | Refills: 1 | Status: SHIPPED | OUTPATIENT
Start: 2023-01-20 | End: 2023-07-28

## 2023-01-20 RX ORDER — DEXTROAMPHETAMINE SACCHARATE, AMPHETAMINE ASPARTATE, DEXTROAMPHETAMINE SULFATE AND AMPHETAMINE SULFATE 2.5; 2.5; 2.5; 2.5 MG/1; MG/1; MG/1; MG/1
10 TABLET ORAL 2 TIMES DAILY
Qty: 180 TABLET | Refills: 0 | Status: SHIPPED | OUTPATIENT
Start: 2023-01-20 | End: 2023-05-03

## 2023-01-20 RX ORDER — BUPROPION HYDROCHLORIDE 150 MG/1
TABLET ORAL
Qty: 90 TABLET | Refills: 0 | Status: SHIPPED | OUTPATIENT
Start: 2023-01-20 | End: 2023-04-20

## 2023-01-20 NOTE — TELEPHONE ENCOUNTER
"Routing refill request to provider for review/approval because:  Labs not current:  PHQ9  Patient needs to be seen because:  Has been longer than 6 months since last appt        Last office visit provider: Eulalia 5/26/22      Requested Prescriptions   Pending Prescriptions Disp Refills     buPROPion (WELLBUTRIN XL) 150 MG 24 hr tablet [Pharmacy Med Name: BUPROPION HCL  MG TABLET] 90 tablet 0     Sig: TAKE 1 TABLET BY MOUTH EVERY DAY IN THE MORNING       SSRIs Protocol Failed - 1/19/2023 12:58 PM        Failed - PHQ-9 score less than 5 in past 6 months     Please review last PHQ-9 score.           Failed - Recent (6 mo) or future (30 days) visit within the authorizing provider's specialty     Patient had office visit in the last 6 months or has a visit in the next 30 days with authorizing provider or within the authorizing provider's specialty.  See \"Patient Info\" tab in inbasket, or \"Choose Columns\" in Meds & Orders section of the refill encounter.            Passed - Medication is Bupropion     If the medication is Bupropion (Wellbutrin), and the patient is taking for smoking cessation; OK to refill.          Passed - Medication is active on med list        Passed - Patient is age 18 or older        Passed - No active pregnancy on record        Passed - No positive pregnancy test in last 12 months           citalopram (CELEXA) 40 MG tablet [Pharmacy Med Name: CITALOPRAM HBR 40 MG TABLET] 90 tablet 1     Sig: TAKE 1 TABLET BY MOUTH EVERY DAY       SSRIs Protocol Failed - 1/19/2023 12:58 PM        Failed - PHQ-9 score less than 5 in past 6 months     Please review last PHQ-9 score.           Failed - Recent (6 mo) or future (30 days) visit within the authorizing provider's specialty     Patient had office visit in the last 6 months or has a visit in the next 30 days with authorizing provider or within the authorizing provider's specialty.  See \"Patient Info\" tab in inbasket, or \"Choose Columns\" in Meds & Orders " section of the refill encounter.            Passed - Medication is Bupropion     If the medication is Bupropion (Wellbutrin), and the patient is taking for smoking cessation; OK to refill.          Passed - Medication is active on med list        Passed - Patient is age 18 or older        Passed - No active pregnancy on record        Passed - No positive pregnancy test in last 12 months               Shantal Schroeder RN, BSN 01/20/23 4:00 PM

## 2023-01-23 NOTE — TELEPHONE ENCOUNTER
First Attempt: I sent ridge chart message to the patient to please contact our office to schedule a med check appt, physical not due until May.

## 2023-02-07 NOTE — PROGRESS NOTES
Comprehensive Annual Visit (CAV) home-visit appointment scheduling outreach outcome:    Unable to make appointment: Patient declined      Pt declined stated she sees her doctor.     Mai Sands  Population Health Assistant   PH. [phone]     She is doing well. She wanted to discuss wearing compression socks because she has swelling in her feet after standing at work. She has some bruising on her ankles bilaterally, which is tender and she thinks may be because of her shoes. She denies any injury to her ankles. No redness or pain with palpation to calves. She has noticed a little shortness of breath. She no longer has any spotting. She still has tingling and numbness in her right fingertips and wears a wrist splint at night. She wanted to discuss a prescription for a breast pump. She was wondering if the parent education classes were necessary or helpful. She declined the flu vaccine. She did the cell-free DNA test, it was normal, and found she is having a girl. She had normal 20 week ultrasound.    1 Hour Glucose Challenge, syphilis screen and labs today. Support socks and breast pump prescription given today. Return in 2 weeks for next routine OB visit, cervical exam, and Rhogam shot.   May buy support socks over the counter. Wear during the day and take off at night. May call [facility] and schedule a tour before delivery.  Routine OB visits every 2 weeks until 36 weeks, then every week after that. If your hand numbness becomes severe, or if you have weakness in your hand, will discuss this with a hand specialist to consider a steroid injection.     The visit lasted a total of 22 minutes face to face with the patient. Over 50% of the time was spent counseling and educating the patient about routine OB visit.  I, Monisha Alston, am scribing for and in the presence of, Dr. Alecia Esteban.  I, Dr. Alecia Esteban, personally performed the services described in this documentation, as scribed by Monisha Alston in my presence, and it is both accurate and complete.

## 2023-02-27 ASSESSMENT — ANXIETY QUESTIONNAIRES
1. FEELING NERVOUS, ANXIOUS, OR ON EDGE: MORE THAN HALF THE DAYS
6. BECOMING EASILY ANNOYED OR IRRITABLE: MORE THAN HALF THE DAYS
GAD7 TOTAL SCORE: 10
GAD7 TOTAL SCORE: 10
7. FEELING AFRAID AS IF SOMETHING AWFUL MIGHT HAPPEN: NOT AT ALL
5. BEING SO RESTLESS THAT IT IS HARD TO SIT STILL: NOT AT ALL
8. IF YOU CHECKED OFF ANY PROBLEMS, HOW DIFFICULT HAVE THESE MADE IT FOR YOU TO DO YOUR WORK, TAKE CARE OF THINGS AT HOME, OR GET ALONG WITH OTHER PEOPLE?: SOMEWHAT DIFFICULT
4. TROUBLE RELAXING: MORE THAN HALF THE DAYS
IF YOU CHECKED OFF ANY PROBLEMS ON THIS QUESTIONNAIRE, HOW DIFFICULT HAVE THESE PROBLEMS MADE IT FOR YOU TO DO YOUR WORK, TAKE CARE OF THINGS AT HOME, OR GET ALONG WITH OTHER PEOPLE: SOMEWHAT DIFFICULT
GAD7 TOTAL SCORE: 10
7. FEELING AFRAID AS IF SOMETHING AWFUL MIGHT HAPPEN: NOT AT ALL
2. NOT BEING ABLE TO STOP OR CONTROL WORRYING: MORE THAN HALF THE DAYS
3. WORRYING TOO MUCH ABOUT DIFFERENT THINGS: MORE THAN HALF THE DAYS

## 2023-02-27 ASSESSMENT — PATIENT HEALTH QUESTIONNAIRE - PHQ9
10. IF YOU CHECKED OFF ANY PROBLEMS, HOW DIFFICULT HAVE THESE PROBLEMS MADE IT FOR YOU TO DO YOUR WORK, TAKE CARE OF THINGS AT HOME, OR GET ALONG WITH OTHER PEOPLE: SOMEWHAT DIFFICULT
SUM OF ALL RESPONSES TO PHQ QUESTIONS 1-9: 8
SUM OF ALL RESPONSES TO PHQ QUESTIONS 1-9: 8

## 2023-03-02 ENCOUNTER — VIRTUAL VISIT (OUTPATIENT)
Dept: FAMILY MEDICINE | Facility: CLINIC | Age: 33
End: 2023-03-02
Payer: COMMERCIAL

## 2023-03-02 DIAGNOSIS — F41.9 ANXIETY: ICD-10-CM

## 2023-03-02 DIAGNOSIS — F98.8 ATTENTION DEFICIT DISORDER, UNSPECIFIED HYPERACTIVITY PRESENCE: Primary | ICD-10-CM

## 2023-03-02 DIAGNOSIS — F33.9 EPISODE OF RECURRENT MAJOR DEPRESSIVE DISORDER, UNSPECIFIED DEPRESSION EPISODE SEVERITY (H): ICD-10-CM

## 2023-03-02 PROCEDURE — 99213 OFFICE O/P EST LOW 20 MIN: CPT | Mod: VID | Performed by: FAMILY MEDICINE

## 2023-03-02 ASSESSMENT — ANXIETY QUESTIONNAIRES: GAD7 TOTAL SCORE: 10

## 2023-03-02 ASSESSMENT — PATIENT HEALTH QUESTIONNAIRE - PHQ9
10. IF YOU CHECKED OFF ANY PROBLEMS, HOW DIFFICULT HAVE THESE PROBLEMS MADE IT FOR YOU TO DO YOUR WORK, TAKE CARE OF THINGS AT HOME, OR GET ALONG WITH OTHER PEOPLE: SOMEWHAT DIFFICULT
SUM OF ALL RESPONSES TO PHQ QUESTIONS 1-9: 8

## 2023-03-02 NOTE — PROGRESS NOTES
Tracey is a 32 year old who is being evaluated via a billable video visit.      How would you like to obtain your AVS? MyChart  If the video visit is dropped, the invitation should be resent by: Text to cell phone: [phone]  Will anyone else be joining your video visit? No          Assessment & Plan       ICD-10-CM    1. Attention deficit disorder, unspecified hyperactivity presence  F98.8       2. Episode of recurrent major depressive disorder, unspecified depression episode severity (H)  F33.9       3. Anxiety  F41.9         Physical in 6 months.    Will do CSA dn urine tox then.    Prescription monitoring program reviewed and patient following the controlled substance agreement.    Could get a light box?  I am happy to write a script if needed and covered by insurance.    Continue Adderall immediate release 10 mg twice a day.    Continue citalopram 20 mg daily.    Continue appropriate on  mg daily.    Put some information below on counselors.  And she will look into 1 that specializes in ADHD.    20 minutes spent on the date of the encounter doing chart review, history and exam, documentation and further activities per the note           No follow-ups on file.    Alecia Esteban MD  [facility]    Subjective   Tracey is a 32 year old, presenting for the following health issues:  Recheck Medication (Med check/ follow up - Anxiety/ depression/ADHD)      History of Present Illness       Mental Health Follow-up:  Patient presents to follow-up on Depression & Anxiety.Patient's depression since last visit has been:  Medium  The patient is not having other symptoms associated with depression.  Patient's anxiety since last visit has been:  Medium  The patient is not having other symptoms associated with anxiety.  Any significant life events: No  Patient is feeling anxious or having panic attacks.  Patient has no concerns about alcohol or drug use.    She eats 2-3 servings of  fruits and vegetables daily.She consumes 1 sweetened beverage(s) daily.She exercises with enough effort to increase her heart rate 9 or less minutes per day.  She exercises with enough effort to increase her heart rate 3 or less days per week.   She is taking medications regularly.    Today's PHQ-9         PHQ-9 Total Score: 8    PHQ-9 Q9 Thoughts of better off dead/self-harm past 2 weeks :   Not at all    How difficult have these problems made it for you to do your work, take care of things at home, or get along with other people: Somewhat difficult  Today's LOGAN-7 Score: 10     ADHD:  On Adderall 10 immediate release twice a day.  Usally 8 am and 12-2 pm.  Needs focus dueing work ours. This dose is helping.  Sleepin ok unless she takes 2nd dose to late in day.  No chest pain or palpitations.     Depression / anxiety:  Feeling like bot a little elevated.  Could be seasonal with lack of sunshine.  Feels like she needs o have some more recognition with her anxiery to know what normal worry is or anxiety.  Did couseling in the past.  Had tried to see a counselor for ADD.    Does not feel like Bupropion is casuing anxiety. Per a pharmacist ok to take together.   Ok to stay on same dose of meds.    Social:  Working at home and this works for her.  Will be changing to 2 days in office and 3 at home.        Review of Systems         Objective           Vitals:  No vitals were obtained today due to virtual visit.    Physical Exam   GENERAL: Healthy, alert and no distress  PSYCH: Mentation appears normal, affect normal/bright, judgement and insight intact, normal speech and appearance well-groomed.                Video-Visit Details    Type of service:  Video Visit     Originating Location (pt. Location): Home    Distant Location (provider location):  On-site  Platform used for Video Visit: Carina

## 2023-03-02 NOTE — PATIENT INSTRUCTIONS
Physical in 6 months.    Will do CSA dn urine tox then.    Prescription monitoring program reviewed and patient following the controlled substance agreement.    Could get a light box?  I am happy to write a script if needed and covered by insurance.    Continue Adderall immediate release 10 mg twice a day.    Continue citalopram 20 mg daily.    Continue appropriate on  mg daily.    Put some information below on counselors.  And she will look into 1 that specializes in ADHD.      [facility]:   [facility] [phone]  [address], [place] [address]   This group also does pediatric ADHD evaluation    [place]:  [place] [facility]: [phone]  [address], [facility] [address]    [facility]: [phone]  [address], [place] [address]    [facility]:  [phone]   [address] [place] [address]  This group also does pediatric ADHD evaluation    [facility]:  [facility] [phone] [address] [place] [address], [address]  This group also does pediatric ADHD evaluation    [place]:  [facility]: [phone]  [address] [place]     [place]:  [facility]  [phone]  [address], [place] [address]    [facility] [phone]    [address], [facility]  [address]    [facility] /[phone]   [address] [place] [address], [address]    [facility] [phone] [address], [address]    [facility] [phone]   [address]    [facility]:  [facility]  Psychiatrists and Psychologists  [phone]    [facility]:  [place]  Adult Sexual Health Counselors:  Hua Estrada and Don Woods  [phone]    [facility]:  [facility]  Counselor that specializes in youth sexual health including transgender  Viki Alvarez  [phone]

## 2023-04-20 DIAGNOSIS — F33.9 EPISODE OF RECURRENT MAJOR DEPRESSIVE DISORDER, UNSPECIFIED DEPRESSION EPISODE SEVERITY (H): ICD-10-CM

## 2023-04-20 RX ORDER — BUPROPION HYDROCHLORIDE 150 MG/1
TABLET ORAL
Qty: 90 TABLET | Refills: 0 | Status: SHIPPED | OUTPATIENT
Start: 2023-04-20 | End: 2023-07-29

## 2023-04-20 NOTE — TELEPHONE ENCOUNTER
"Routing refill request to provider for review/approval because:  PHQ-9 please review    Last Written Prescription Date:  1/20/2023  Last Fill Quantity: 90,  # refills: 0   Last office visit provider:  3/2/2023    Requested Prescriptions   Pending Prescriptions Disp Refills     buPROPion (WELLBUTRIN XL) 150 MG 24 hr tablet [Pharmacy Med Name: BUPROPION HCL  MG TABLET] 90 tablet 0     Sig: TAKE 1 TABLET BY MOUTH EVERY DAY IN THE MORNING       SSRIs Protocol Failed - 4/20/2023  3:53 PM        Failed - PHQ-9 score less than 5 in past 6 months     Please review last PHQ-9 score.           Passed - Medication is Bupropion     If the medication is Bupropion (Wellbutrin), and the patient is taking for smoking cessation; OK to refill.          Passed - Medication is active on med list        Passed - Patient is age 18 or older        Passed - No active pregnancy on record        Passed - No positive pregnancy test in last 12 months        Passed - Recent (6 mo) or future (30 days) visit within the authorizing provider's specialty     Patient had office visit in the last 6 months or has a visit in the next 30 days with authorizing provider or within the authorizing provider's specialty.  See \"Patient Info\" tab in inbasket, or \"Choose Columns\" in Meds & Orders section of the refill encounter.                 Kenna Ricardo RN 04/20/23 3:54 PM  "

## 2023-04-26 ENCOUNTER — PATIENT OUTREACH (OUTPATIENT)
Dept: CARE COORDINATION | Facility: CLINIC | Age: 33
End: 2023-04-26
Payer: COMMERCIAL

## 2023-05-03 ENCOUNTER — MYC REFILL (OUTPATIENT)
Dept: FAMILY MEDICINE | Facility: CLINIC | Age: 33
End: 2023-05-03
Payer: COMMERCIAL

## 2023-05-03 DIAGNOSIS — F98.8 ATTENTION DEFICIT DISORDER (ADD) WITHOUT HYPERACTIVITY: ICD-10-CM

## 2023-05-04 RX ORDER — DEXTROAMPHETAMINE SACCHARATE, AMPHETAMINE ASPARTATE, DEXTROAMPHETAMINE SULFATE AND AMPHETAMINE SULFATE 2.5; 2.5; 2.5; 2.5 MG/1; MG/1; MG/1; MG/1
10 TABLET ORAL 2 TIMES DAILY
Qty: 180 TABLET | Refills: 0 | Status: SHIPPED | OUTPATIENT
Start: 2023-05-04 | End: 2023-08-31

## 2023-05-04 NOTE — TELEPHONE ENCOUNTER
Routing refill request to provider for review/approval because:  Drug not on the [facility] refill protocol     Last Written Prescription Date:  1/20/2023  Last Fill Quantity: 180,  # refills: 0   Last office visit provider:  3/2/2023     Requested Prescriptions   Pending Prescriptions Disp Refills     amphetamine-dextroamphetamine (ADDERALL) 10 MG tablet 180 tablet 0     Sig: Take 1 tablet (10 mg) by mouth 2 times daily Take 1 tablet by mouth       There is no refill protocol information for this order          Chanel Jensen RN 05/03/23 9:01 PM

## 2023-07-28 DIAGNOSIS — F33.9 EPISODE OF RECURRENT MAJOR DEPRESSIVE DISORDER, UNSPECIFIED DEPRESSION EPISODE SEVERITY (H): ICD-10-CM

## 2023-07-28 RX ORDER — CITALOPRAM HYDROBROMIDE 40 MG/1
TABLET ORAL
Qty: 90 TABLET | Refills: 1 | Status: SHIPPED | OUTPATIENT
Start: 2023-07-28 | End: 2024-02-05

## 2023-07-28 NOTE — TELEPHONE ENCOUNTER
"Routing refill request to provider for review/approval because:  PHQ9 out of range    Last Written Prescription Date:  01/20/2023  Last Fill Quantity: 90,  # refills: 1   Last office visit provider:  03/02/2023     Requested Prescriptions   Pending Prescriptions Disp Refills    citalopram (CELEXA) 40 MG tablet [Pharmacy Med Name: CITALOPRAM HBR 40 MG TABLET] 90 tablet 1     Sig: TAKE 1 TABLET BY MOUTH EVERY DAY       SSRIs Protocol Failed - 7/28/2023 11:59 AM        Failed - PHQ-9 score less than 5 in past 6 months     Please review last PHQ-9 score.           Passed - Medication is active on med list        Passed - Patient is age 18 or older        Passed - No active pregnancy on record        Passed - No positive pregnancy test in last 12 months        Passed - Recent (6 mo) or future (30 days) visit within the authorizing provider's specialty     Patient had office visit in the last 6 months or has a visit in the next 30 days with authorizing provider or within the authorizing provider's specialty.  See \"Patient Info\" tab in inbasket, or \"Choose Columns\" in Meds & Orders section of the refill encounter.                 Maricarmen Johnson RN 07/28/23 11:59 AM  "

## 2023-07-29 ENCOUNTER — HEALTH MAINTENANCE LETTER (OUTPATIENT)
Age: 33
End: 2023-07-29

## 2023-07-29 DIAGNOSIS — F33.9 EPISODE OF RECURRENT MAJOR DEPRESSIVE DISORDER, UNSPECIFIED DEPRESSION EPISODE SEVERITY (H): ICD-10-CM

## 2023-07-29 RX ORDER — BUPROPION HYDROCHLORIDE 150 MG/1
TABLET ORAL
Qty: 90 TABLET | Refills: 0 | Status: SHIPPED | OUTPATIENT
Start: 2023-07-29 | End: 2023-10-26

## 2023-07-29 NOTE — TELEPHONE ENCOUNTER
"Routing refill request to provider for review/approval because:  PHQ9 needs updating    Last Written Prescription Date:  4/20/23  Last Fill Quantity: 90,  # refills: 0   Last office visit provider:  3/2/23     Requested Prescriptions   Pending Prescriptions Disp Refills    buPROPion (WELLBUTRIN XL) 150 MG 24 hr tablet [Pharmacy Med Name: BUPROPION HCL  MG TABLET] 90 tablet 0     Sig: TAKE 1 TABLET BY MOUTH EVERY DAY IN THE MORNING       SSRIs Protocol Failed - 7/29/2023  3:25 AM        Failed - PHQ-9 score less than 5 in past 6 months     Please review last PHQ-9 score.           Passed - Medication is Bupropion     If the medication is Bupropion (Wellbutrin), and the patient is taking for smoking cessation; OK to refill.          Passed - Medication is active on med list        Passed - Patient is age 18 or older        Passed - No active pregnancy on record        Passed - No positive pregnancy test in last 12 months        Passed - Recent (6 mo) or future (30 days) visit within the authorizing provider's specialty     Patient had office visit in the last 6 months or has a visit in the next 30 days with authorizing provider or within the authorizing provider's specialty.  See \"Patient Info\" tab in inbasket, or \"Choose Columns\" in Meds & Orders section of the refill encounter.                 Colleen Fernandez RN 07/29/23 3:26 AM  "

## 2023-08-31 DIAGNOSIS — F98.8 ATTENTION DEFICIT DISORDER (ADD) WITHOUT HYPERACTIVITY: ICD-10-CM

## 2023-09-01 RX ORDER — DEXTROAMPHETAMINE SACCHARATE, AMPHETAMINE ASPARTATE, DEXTROAMPHETAMINE SULFATE AND AMPHETAMINE SULFATE 2.5; 2.5; 2.5; 2.5 MG/1; MG/1; MG/1; MG/1
10 TABLET ORAL 2 TIMES DAILY
Qty: 60 TABLET | Refills: 0 | Status: SHIPPED | OUTPATIENT
Start: 2023-09-01 | End: 2023-10-26

## 2023-09-01 NOTE — TELEPHONE ENCOUNTER
Spoke with pt - She take 1 tablet twice a day. She does have a med check scheduled with Dr. Esteban in October 2023.

## 2023-10-25 ASSESSMENT — PATIENT HEALTH QUESTIONNAIRE - PHQ9: SUM OF ALL RESPONSES TO PHQ QUESTIONS 1-9: 7

## 2023-10-26 ENCOUNTER — VIRTUAL VISIT (OUTPATIENT)
Dept: FAMILY MEDICINE | Facility: CLINIC | Age: 33
End: 2023-10-26
Payer: COMMERCIAL

## 2023-10-26 DIAGNOSIS — G89.29 CHRONIC BILATERAL LOW BACK PAIN WITHOUT SCIATICA: ICD-10-CM

## 2023-10-26 DIAGNOSIS — Z79.899 CONTROLLED SUBSTANCE AGREEMENT SIGNED: ICD-10-CM

## 2023-10-26 DIAGNOSIS — F33.9 EPISODE OF RECURRENT MAJOR DEPRESSIVE DISORDER, UNSPECIFIED DEPRESSION EPISODE SEVERITY (H): ICD-10-CM

## 2023-10-26 DIAGNOSIS — G56.03 BILATERAL CARPAL TUNNEL SYNDROME: ICD-10-CM

## 2023-10-26 DIAGNOSIS — F98.8 ATTENTION DEFICIT DISORDER (ADD) WITHOUT HYPERACTIVITY: ICD-10-CM

## 2023-10-26 DIAGNOSIS — E78.5 HYPERLIPIDEMIA, UNSPECIFIED HYPERLIPIDEMIA TYPE: ICD-10-CM

## 2023-10-26 DIAGNOSIS — Z00.00 ENCOUNTER FOR PREVENTATIVE ADULT HEALTH CARE EXAMINATION: Primary | ICD-10-CM

## 2023-10-26 DIAGNOSIS — M54.50 CHRONIC BILATERAL LOW BACK PAIN WITHOUT SCIATICA: ICD-10-CM

## 2023-10-26 DIAGNOSIS — L70.9 ACNE, UNSPECIFIED ACNE TYPE: ICD-10-CM

## 2023-10-26 DIAGNOSIS — B35.4 TINEA CORPORIS: ICD-10-CM

## 2023-10-26 DIAGNOSIS — D23.9 DYSPLASTIC NEVI: ICD-10-CM

## 2023-10-26 DIAGNOSIS — F41.9 ANXIETY: ICD-10-CM

## 2023-10-26 PROBLEM — O22.13: Status: RESOLVED | Noted: 2019-03-01 | Resolved: 2023-10-26

## 2023-10-26 PROCEDURE — 99214 OFFICE O/P EST MOD 30 MIN: CPT | Mod: 25 | Performed by: FAMILY MEDICINE

## 2023-10-26 PROCEDURE — 99395 PREV VISIT EST AGE 18-39: CPT | Mod: VID | Performed by: FAMILY MEDICINE

## 2023-10-26 RX ORDER — DEXTROAMPHETAMINE SACCHARATE, AMPHETAMINE ASPARTATE, DEXTROAMPHETAMINE SULFATE AND AMPHETAMINE SULFATE 2.5; 2.5; 2.5; 2.5 MG/1; MG/1; MG/1; MG/1
10 TABLET ORAL 2 TIMES DAILY
Qty: 60 TABLET | Refills: 0 | Status: SHIPPED | OUTPATIENT
Start: 2023-10-26 | End: 2023-12-22

## 2023-10-26 RX ORDER — BUPROPION HYDROCHLORIDE 150 MG/1
TABLET ORAL
Qty: 120 TABLET | Refills: 3 | Status: SHIPPED | OUTPATIENT
Start: 2023-10-26 | End: 2023-10-29

## 2023-10-26 ASSESSMENT — ENCOUNTER SYMPTOMS
FEVER: 0
ARTHRALGIAS: 1
DIZZINESS: 0
DYSURIA: 0
CHILLS: 0
ABDOMINAL PAIN: 0
BREAST MASS: 0
FREQUENCY: 0
NAUSEA: 0
NERVOUS/ANXIOUS: 1
WEAKNESS: 0
DIARRHEA: 0
EYE PAIN: 0
SHORTNESS OF BREATH: 0
HEMATURIA: 0
HEMATOCHEZIA: 0
PARESTHESIAS: 1
PALPITATIONS: 0
HEARTBURN: 0
JOINT SWELLING: 0
SORE THROAT: 0
CONSTIPATION: 0
COUGH: 0
MYALGIAS: 0
HEADACHES: 0

## 2023-10-26 ASSESSMENT — PATIENT HEALTH QUESTIONNAIRE - PHQ9
SUM OF ALL RESPONSES TO PHQ QUESTIONS 1-9: 7
10. IF YOU CHECKED OFF ANY PROBLEMS, HOW DIFFICULT HAVE THESE PROBLEMS MADE IT FOR YOU TO DO YOUR WORK, TAKE CARE OF THINGS AT HOME, OR GET ALONG WITH OTHER PEOPLE: SOMEWHAT DIFFICULT

## 2023-10-26 NOTE — LETTER
[facility]  10/26/23  Patient: Tracey Crawford  YOB: 1990  Medical Record Number: [id number]                                                                                  Non-Opioid Controlled Substance Agreement    This is an agreement between you and your provider regarding safe and appropriate use of controlled substances prescribed by your care team. Controlled substances are?medicines that can cause physical and mental dependence (abuse).     There are strict laws about having and using these medicines. We here at [facility] are  committed to working with you in your efforts to get better. To support you in this work, we'll help you schedule regular office appointments for medicine refills. If we must cancel or change your appointment for any reason, we'll make sure you have enough medicine to last until your next appointment.     As a Provider, I will:   Listen carefully to your concerns while treating you with respect.   Recommend a treatment plan that I believe is in your best interest and may involve therapies other than medicine.    Talk with you often about the possible benefits and the risk of harm of any medicine that we prescribe for you.  Assess the safety of this medicine and check how well it works.    Provide a plan on how to taper (discontinue or go off) using this medicine if the decision is made to stop its use.      ::  As a Patient, I understand controlled substances:     Are prescribed by my care provider to help me function or work and manage my condition(s).?  Are strong medicines and can cause serious side effects.     Need to be taken exactly as prescribed.?Combining controlled substances with certain medicines or chemicals (such as illegal drugs, alcohol, sedatives, sleeping pills, and benzodiazepines) can be dangerous or even fatal.? If I stop taking my medicines suddenly, I may have severe withdrawal symptoms.     The risks, benefits,  and side effects of these medicine(s) were explained to me. I agree that:    I will take part in other treatments as advised by my care team. This may be psychiatry or counseling, physical therapy, behavioral therapy, group treatment or a referral to specialist.    I will keep all my appointments and understand this is part of the monitoring of controlled substances.?My care team may require an office visit for EVERY controlled substance refill. If I miss appointments or don t follow instructions, my care team may stop my medicine    I will take my medicines as prescribed. I will not change the dose or schedule unless my care team tells me to. There will be no refills if I run out early.      I may be asked to come to the clinic and complete a urine drug test or complete a pill count. If I don t give a urine sample or participate in a pill count, the care team may stop my medicine.    I will only receive controlled substance prescriptions from this clinic. If I am treated by another provider, I will tell them that I am taking controlled substances and that I have a treatment agreement with this provider. I will inform my [facility] care team within one business day if I am given a prescription for any controlled substance by another healthcare provider. My [facility] care team can contact other providers and pharmacists about my use of any medicines.    It is up to me to make sure that I don't run out of my medicines on weekends or holidays.?If my care team is willing to refill my prescription without a visit, I must request refills only during office hours. Refills may take up to 3 business days to process. I will use one pharmacy to fill all my controlled substance prescriptions. I will notify the clinic about any changes to my insurance or medicine availability.    I am responsible for my prescriptions. If the medicine/prescription is lost, stolen or destroyed, it will not be replaced.?I also agree  not to share controlled substance medicines with anyone.     I am aware I should not use any illegal or recreational drugs. I agree not to drink alcohol unless my care team says I can.     If I enroll in the [place] Medical Cannabis program, I will tell my care team before my next refill.    I will tell my care team right away if I become pregnant, have a new medical problem treated outside of my regular clinic, or have a change in my medicines.     I understand that this medicine can affect my thinking, judgment and reaction time.? Alcohol and drugs affect the brain and body, which can affect the safety of my driving. Being under the influence of alcohol or drugs can affect my decision-making, behaviors, personal safety and the safety of others. Driving while impaired (DWI) can occur if a person is driving, operating or in physical control of a car, motorcycle, boat, snowmobile, ATV, motorbike, off-road vehicle or any other motor vehicle ([place] Statute 169A.20). I understand the risk if I choose to drive or operate any vehicle or machinery.    I understand that if I do not follow any of the conditions above, my prescriptions or treatment may be stopped or changed.   I agree that my provider, clinic care team and pharmacy may work with any city, state or federal law enforcement agency that investigates the misuse, sale or other diversion of my controlled medicine. I will allow my provider to discuss my care with, or share a copy of, this agreement with any other treating provider, pharmacy or emergency room where I receive care.     I have read this agreement and have asked questions about anything I did not understand.    ________________________________________________________  Patient Signature - Tracey Crawford     ___________________                   Date     ________________________________________________________  Provider Signature - Alecia Esteban MD       ___________________                   Date      ________________________________________________________  Witness Signature (required if provider not present while patient signing)          ___________________                   Date

## 2023-10-26 NOTE — PROGRESS NOTES
SUBJECTIVE:   CC: Tracey is an 33 year old who presents for preventive health visit.   Patient presents with:  Physical: No fasting, lost job recently and feeling stressed out.   Refill adderral  Derm Problem: Rash on inner arm.      Healthy Habits:     Getting at least 3 servings of Calcium per day:  NO    Bi-annual eye exam:  NO    Dental care twice a year:  NO    Sleep apnea or symptoms of sleep apnea:  Daytime drowsiness and Excessive snoring    Diet:  Regular (no restrictions)    Frequency of exercise:  None    Taking medications regularly:  Yes    Medication side effects:  None    Additional concerns today:  Yes    ADD: She is finding the med is very helpful for her concentration and focus.  She does not feel she needs a dose adjustment.  Side effects of chest pain palpitations insomnia anorexia etc.    Rash:  Faint [place] in left antecubital fossa.  It is a pink ring with a central clearing.  It does itch a little.  Does not have that on the right antecubital fossa.  She tried some triamcinolone on it a few times and has not helped.    Acne:  Face and neck broken up more recently.  Wonder if it is related to recent stressors.    Mood: Up-and-down for couple of months.  She lost her job in August which is because financial stressors but actually a good thing because she did not approve of the way they were running things at her last job.  She is very stressed going to the job.  She said her motivation has been low.  There are some new stressors with a new job that she started this week but overall feeling better mood wise.  No suicidal or homicidal ideation.  Her emotions really flared right before and with her period.    Menses:  Pretty regular.  Not on birth control.  She had been on the NuvaRing 9 to 10 years ago and had no problems with it.  Does not use tobacco.  She said her mom went through menopause at 40 and had some general questions about that.    Back pain:  Has had sciatica in the past and seen  chiropractor.  Now low lateral back pain.  Does not shoot down legs.  Will move a certain way and have a quick sharp severe pain.    Carpal tunnel:  Still has the symptoms.  Not wearing wrist splints at night.  Notices wth eating with a fork or writing.  Makes hand go numb.  Sleeps with hands above head and may not have symptms at night.    Social: She just lost her last job in August but it was not a good situation for her so is actually relief.  She did recently start a new job.  She is working for the [employer].  She would like the fact that she gets to work from home.   Recently took a trip to [place] for her cousin's wedding and went with her grandmother.  She thought it was very important should go on a very special situation to be able to go to [place] with her grandmother but it did hurt them a bit financially.    Health Maintenance   Topic Date Due    INFLUENZA VACCINE (1) Can get anytime if wishes    COVID-19 Vaccine (3 - 2023-24 season) Can get anytime if wishes    PHQ-9  04/26/2024    YEARLY PREVENTIVE VISIT  Today    ANNUAL REVIEW OF HM ORDERS  Today    HPV TEST  05/26/2025    PAP  05/26/2025    ADVANCE CARE PLANNING  Declined    DTAP/TDAP/TD IMMUNIZATION (9 - Td or Tdap) 03/07/2029    HIV SCREENING  Completed    DEPRESSION ACTION PLAN  Completed    IPV IMMUNIZATION  Completed    HPV IMMUNIZATION  Completed    HEPATITIS B IMMUNIZATION  Completed    Pneumococcal Vaccine: Pediatrics (0 to 5 Years) and At-Risk Patients (6 to 64 Years)  Age 65    MENINGITIS IMMUNIZATION  Aged Out    HEPATITIS C SCREENING  Discontinued                     Social History     Tobacco Use    Smoking status: Never    Smokeless tobacco: Never   Substance Use Topics    Alcohol use: Not on file             10/23/2023    12:23 PM   Alcohol Use   Prescreen: >3 drinks/day or >7 drinks/week? No     Reviewed orders with patient.  Reviewed health maintenance and updated orders accordingly - Yes  Lab work is in  process    Breast Cancer Screenin/26/2022     3:09 PM   Breast CA Risk Assessment (FHS-7)   Do you have a family history of breast, colon, or ovarian cancer? No / Unknown         Patient under 40 years of age: Routine Mammogram Screening not recommended.   Pertinent mammograms are reviewed under the imaging tab.    History of abnormal Pap smear: NO - age 30- 65 PAP every 3 years recommended      Latest Ref Rng & Units 2022     4:27 PM 2019     3:23 PM 2017    11:46 AM   PAP / HPV   PAP  Negative for Intraepithelial Lesion or Malignancy (NILM)  Negative for squamous intraepithelial lesion or malignancy  Electronically signed by Krista Worley CT (ASCP) on 2019 at  1:51 PM    Negative for squamous intraepithelial lesion or malignancy  Electronically signed by Kimberly Hurst CT (ASCP) on 2017 at  3:27 PM      HPV 16 DNA Negative Negative  Negative     HPV 18 DNA Negative Negative  Negative     Other HR HPV Negative Negative  Negative       Reviewed and updated as needed this visit by clinical staff   Tobacco  Allergies  Meds              Reviewed and updated as needed this visit by Provider                     Review of Systems   Constitutional:  Negative for chills and fever.   HENT:  Negative for congestion, ear pain, hearing loss and sore throat.    Eyes:  Negative for pain and visual disturbance.   Respiratory:  Negative for cough and shortness of breath.    Cardiovascular:  Negative for chest pain, palpitations and peripheral edema.   Gastrointestinal:  Negative for abdominal pain, constipation, diarrhea, heartburn, hematochezia and nausea.   Breasts:  Negative for tenderness, breast mass and discharge.   Genitourinary:  Positive for pelvic pain. Negative for dysuria, frequency, genital sores, hematuria, urgency, vaginal bleeding and vaginal discharge.   Musculoskeletal:  Positive for arthralgias. Negative for joint swelling and myalgias.   Skin:  Positive for  rash.   Neurological:  Positive for paresthesias. Negative for dizziness, weakness and headaches.   Psychiatric/Behavioral:  Positive for mood changes. The patient is nervous/anxious.           OBJECTIVE:   LMP 10/26/2023   Breastfeeding No   Physical Exam  GENERAL: healthy, alert and no distress on video    Diagnostic Test Results:  Labs reviewed in Epic    ASSESSMENT/PLAN:       ICD-10-CM    1. Encounter for preventative adult health care examination  Z00.00       2. Attention deficit disorder (ADD) without hyperactivity  F98.8 amphetamine-dextroamphetamine (ADDERALL) 10 MG tablet      3. Episode of recurrent major depressive disorder, unspecified depression episode severity (H24)  F33.9 buPROPion (WELLBUTRIN XL) 150 MG 24 hr tablet      4. Bilateral carpal tunnel syndrome  G56.03 Orthopedic  Referral      5. Hyperlipidemia, unspecified hyperlipidemia type  E78.5 Lipid panel reflex to direct LDL Fasting     Comprehensive metabolic panel (BMP + Alb, Alk Phos, ALT, AST, Total. Bili, TP)      6. Anxiety  F41.9 CBC with platelets     Vitamin D Deficiency      7. Dysplastic nevi  D23.9 TSH with free T4 reflex      8. Chronic bilateral low back pain without sciatica  M54.50 Spine  Referral    G89.29       9. Controlled substance agreement signed for Adderall-CSA and urine tox Oct. 2023  Z79.899 Urine Drug Screen      10. Tinea corporis  B35.4       11. Acne, unspecified acne type  L70.9         Continue Citalopram 40 mg daily.    Continue Wellbutrin 150 mg XL daily and can increase to 300 mg XL for 10 days (5 days before your menstrual cycle in 5 days during her menstrual cycle).    Could discuss birth control if you wish to help stabilize the hormones.    It looks like she has tinea corporis in the left antecubital fossa so recommend Lotrimin ultra twice a day for 4 weeks.  If that does not take it away she could use the triamcinolone she has twice a day for 2 weeks.  If that persists would recommend  dermatology.    She think she has clindamycin gel at home and can use that twice a day on her face for acne.  She will let us know if she needs a prescription.    Spine clinic referaal.    Mammogram age 40 for screening would be a good idea for baseline around age 37-38 if insurance covers.    For carpal tunnel recommend wrist splints at bedtime if she finds it helpful.  To place an orthopedic hand consult.    Dermatology yearly for full body skin checks.  Due now.    She will set a lab appointment.  Labs ordered including urine tox.    The clinical assistant did send her the controlled substance agreement to print and sign.  She can bring that in with her lab appointment.    Med check in 6 months.               COUNSELING:  Reviewed preventive health counseling, as reflected in patient instructions       Healthy diet/nutrition        She reports that she has never smoked. She has never used smokeless tobacco.          Alecia Esteban MD  [facility]  Answers submitted by the patient for this visit:  Patient Health Questionnaire (Submitted on 10/26/2023)  If you checked off any problems, how difficult have these problems made it for you to do your work, take care of things at home, or get along with other people?: Somewhat difficult  PHQ9 TOTAL SCORE: 7

## 2023-10-26 NOTE — PATIENT INSTRUCTIONS
Continue Citalopram 40 mg daily.    Continue Wellbutrin 150 mg XL daily and can increase to 300 mg XL for 10 days (5 days before your menstrual cycle in 5 days during her menstrual cycle).    Could discuss birth control if you wish to help stabilize the hormones.    It looks like she has tinea corporis in the left antecubital fossa so recommend Lotrimin ultra twice a day for 4 weeks.  If that does not take it away she could use the triamcinolone she has twice a day for 2 weeks.  If that persists would recommend dermatology.    She think she has clindamycin gel at home and can use that twice a day on her face for acne.  She will let us know if she needs a prescription.    Spine clinic referaal.    Mammogram age 40 for screening would be a good idea for baseline around age 37-38 if insurance covers.    For carpal tunnel recommend wrist splints at bedtime if she finds it helpful.  To place an orthopedic hand consult.    Dermatology yearly for full body skin checks.  Due now.    She will set a lab appointment.  Labs ordered including urine tox.    The clinical assistant did send her the controlled substance agreement to print and sign.  She can bring that in with her lab appointment.    Med check in 6 months.             Health Maintenance   Topic Date Due    INFLUENZA VACCINE (1) Can get anytime if wishes    COVID-19 Vaccine (3 - 2023-24 season) Can get anytime if wishes    PHQ-9  04/26/2024    YEARLY PREVENTIVE VISIT  Today    ANNUAL REVIEW OF HM ORDERS  Today    HPV TEST  05/26/2025    PAP  05/26/2025    ADVANCE CARE PLANNING  Declined    DTAP/TDAP/TD IMMUNIZATION (9 - Td or Tdap) 03/07/2029    HIV SCREENING  Completed    DEPRESSION ACTION PLAN  Completed    IPV IMMUNIZATION  Completed    HPV IMMUNIZATION  Completed    HEPATITIS B IMMUNIZATION  Completed    Pneumococcal Vaccine: Pediatrics (0 to 5 Years) and At-Risk Patients (6 to 64 Years)  Age 65    MENINGITIS IMMUNIZATION  Aged Out    HEPATITIS C SCREENING   Discontinued

## 2023-10-29 DIAGNOSIS — F41.9 ANXIETY: Primary | ICD-10-CM

## 2023-10-29 RX ORDER — BUPROPION HYDROCHLORIDE 150 MG/1
300 TABLET ORAL EVERY MORNING
Qty: 180 TABLET | Refills: 3 | Status: SHIPPED | OUTPATIENT
Start: 2023-10-29 | End: 2023-10-29

## 2023-10-29 RX ORDER — BUPROPION HYDROCHLORIDE 150 MG/1
300 TABLET ORAL EVERY MORNING
Qty: 180 TABLET | Refills: 3 | Status: SHIPPED | OUTPATIENT
Start: 2023-10-29 | End: 2024-07-31

## 2023-12-22 ENCOUNTER — LAB (OUTPATIENT)
Dept: LAB | Facility: CLINIC | Age: 33
End: 2023-12-22
Payer: COMMERCIAL

## 2023-12-22 DIAGNOSIS — D23.9 DYSPLASTIC NEVI: ICD-10-CM

## 2023-12-22 DIAGNOSIS — E78.5 HYPERLIPIDEMIA, UNSPECIFIED HYPERLIPIDEMIA TYPE: ICD-10-CM

## 2023-12-22 DIAGNOSIS — Z79.899 CONTROLLED SUBSTANCE AGREEMENT SIGNED: ICD-10-CM

## 2023-12-22 DIAGNOSIS — F98.8 ATTENTION DEFICIT DISORDER (ADD) WITHOUT HYPERACTIVITY: ICD-10-CM

## 2023-12-22 DIAGNOSIS — F41.9 ANXIETY: ICD-10-CM

## 2023-12-22 LAB
AMPHETAMINES UR QL SCN: NORMAL
BARBITURATES UR QL SCN: NORMAL
BENZODIAZ UR QL SCN: NORMAL
BZE UR QL SCN: NORMAL
CANNABINOIDS UR QL SCN: NORMAL
ERYTHROCYTE [DISTWIDTH] IN BLOOD BY AUTOMATED COUNT: 12.3 % (ref 10–15)
FENTANYL UR QL: NORMAL
HCT VFR BLD AUTO: 37.9 % (ref 35–47)
HGB BLD-MCNC: 13 G/DL (ref 11.7–15.7)
MCH RBC QN AUTO: 30.2 PG (ref 26.5–33)
MCHC RBC AUTO-ENTMCNC: 34.3 G/DL (ref 31.5–36.5)
MCV RBC AUTO: 88 FL (ref 78–100)
OPIATES UR QL SCN: NORMAL
PCP QUAL URINE (ROCHE): NORMAL
PLATELET # BLD AUTO: 306 10E3/UL (ref 150–450)
RBC # BLD AUTO: 4.31 10E6/UL (ref 3.8–5.2)
WBC # BLD AUTO: 8.5 10E3/UL (ref 4–11)

## 2023-12-22 PROCEDURE — 84443 ASSAY THYROID STIM HORMONE: CPT

## 2023-12-22 PROCEDURE — 80307 DRUG TEST PRSMV CHEM ANLYZR: CPT

## 2023-12-22 PROCEDURE — 80053 COMPREHEN METABOLIC PANEL: CPT

## 2023-12-22 PROCEDURE — 36415 COLL VENOUS BLD VENIPUNCTURE: CPT

## 2023-12-22 PROCEDURE — 80061 LIPID PANEL: CPT

## 2023-12-22 PROCEDURE — 85027 COMPLETE CBC AUTOMATED: CPT

## 2023-12-22 PROCEDURE — 82306 VITAMIN D 25 HYDROXY: CPT

## 2023-12-23 LAB
ALBUMIN SERPL BCG-MCNC: 4 G/DL (ref 3.5–5.2)
ALP SERPL-CCNC: 56 U/L (ref 40–150)
ALT SERPL W P-5'-P-CCNC: 12 U/L (ref 0–50)
ANION GAP SERPL CALCULATED.3IONS-SCNC: 6 MMOL/L (ref 7–15)
AST SERPL W P-5'-P-CCNC: 13 U/L (ref 0–45)
BILIRUB SERPL-MCNC: 0.3 MG/DL
BUN SERPL-MCNC: 12.9 MG/DL (ref 6–20)
CALCIUM SERPL-MCNC: 8.9 MG/DL (ref 8.6–10)
CHLORIDE SERPL-SCNC: 103 MMOL/L (ref 98–107)
CHOLEST SERPL-MCNC: 201 MG/DL
CREAT SERPL-MCNC: 0.9 MG/DL (ref 0.51–0.95)
DEPRECATED HCO3 PLAS-SCNC: 30 MMOL/L (ref 22–29)
EGFRCR SERPLBLD CKD-EPI 2021: 85 ML/MIN/1.73M2
FASTING STATUS PATIENT QL REPORTED: NO
GLUCOSE SERPL-MCNC: 95 MG/DL (ref 70–99)
HDLC SERPL-MCNC: 43 MG/DL
LDLC SERPL CALC-MCNC: 112 MG/DL
NONHDLC SERPL-MCNC: 158 MG/DL
POTASSIUM SERPL-SCNC: 4.1 MMOL/L (ref 3.4–5.3)
PROT SERPL-MCNC: 6.1 G/DL (ref 6.4–8.3)
SODIUM SERPL-SCNC: 139 MMOL/L (ref 135–145)
TRIGL SERPL-MCNC: 231 MG/DL
TSH SERPL DL<=0.005 MIU/L-ACNC: 2.88 UIU/ML (ref 0.3–4.2)
VIT D+METAB SERPL-MCNC: 16 NG/ML (ref 20–50)

## 2023-12-23 RX ORDER — DEXTROAMPHETAMINE SACCHARATE, AMPHETAMINE ASPARTATE, DEXTROAMPHETAMINE SULFATE AND AMPHETAMINE SULFATE 2.5; 2.5; 2.5; 2.5 MG/1; MG/1; MG/1; MG/1
10 TABLET ORAL 2 TIMES DAILY
Qty: 60 TABLET | Refills: 0 | Status: SHIPPED | OUTPATIENT
Start: 2023-12-23 | End: 2024-02-08

## 2024-02-04 DIAGNOSIS — F33.9 EPISODE OF RECURRENT MAJOR DEPRESSIVE DISORDER, UNSPECIFIED DEPRESSION EPISODE SEVERITY (H): ICD-10-CM

## 2024-02-05 RX ORDER — CITALOPRAM HYDROBROMIDE 40 MG/1
TABLET ORAL
Qty: 90 TABLET | Refills: 1 | Status: SHIPPED | OUTPATIENT
Start: 2024-02-05 | End: 2024-07-31

## 2024-02-08 ENCOUNTER — E-VISIT (OUTPATIENT)
Dept: FAMILY MEDICINE | Facility: CLINIC | Age: 34
End: 2024-02-08
Payer: COMMERCIAL

## 2024-02-08 ENCOUNTER — MYC REFILL (OUTPATIENT)
Dept: FAMILY MEDICINE | Facility: CLINIC | Age: 34
End: 2024-02-08

## 2024-02-08 DIAGNOSIS — F98.8 ATTENTION DEFICIT DISORDER (ADD) WITHOUT HYPERACTIVITY: ICD-10-CM

## 2024-02-08 DIAGNOSIS — J01.90 ACUTE SINUSITIS, RECURRENCE NOT SPECIFIED, UNSPECIFIED LOCATION: Primary | ICD-10-CM

## 2024-02-08 PROCEDURE — 99421 OL DIG E/M SVC 5-10 MIN: CPT | Performed by: FAMILY MEDICINE

## 2024-02-08 RX ORDER — DEXTROAMPHETAMINE SACCHARATE, AMPHETAMINE ASPARTATE, DEXTROAMPHETAMINE SULFATE AND AMPHETAMINE SULFATE 2.5; 2.5; 2.5; 2.5 MG/1; MG/1; MG/1; MG/1
10 TABLET ORAL 2 TIMES DAILY
Qty: 180 TABLET | Refills: 0 | Status: SHIPPED | OUTPATIENT
Start: 2024-02-08 | End: 2024-06-10

## 2024-02-19 ENCOUNTER — NURSE TRIAGE (OUTPATIENT)
Dept: NURSING | Facility: CLINIC | Age: 34
End: 2024-02-19
Payer: COMMERCIAL

## 2024-02-19 NOTE — TELEPHONE ENCOUNTER
Received call from patient stating that she had gallbladder surgery on 2/15 at [facility] and was told to let her provider's office know. She is wondering if the surgery is showing up on her records. In reviewing the chart, triager was able to find the information. Relayed this information to the patient. She had no further questions.    Reason for Disposition   General information question, no triage required and triager able to answer question    Protocols used: Information Only Call - No Triage-A-[place]

## 2024-06-10 ENCOUNTER — MYC REFILL (OUTPATIENT)
Dept: FAMILY MEDICINE | Facility: CLINIC | Age: 34
End: 2024-06-10
Payer: COMMERCIAL

## 2024-06-10 DIAGNOSIS — F98.8 ATTENTION DEFICIT DISORDER (ADD) WITHOUT HYPERACTIVITY: ICD-10-CM

## 2024-06-10 RX ORDER — DEXTROAMPHETAMINE SACCHARATE, AMPHETAMINE ASPARTATE, DEXTROAMPHETAMINE SULFATE AND AMPHETAMINE SULFATE 2.5; 2.5; 2.5; 2.5 MG/1; MG/1; MG/1; MG/1
10 TABLET ORAL 2 TIMES DAILY
Qty: 180 TABLET | Refills: 0 | Status: SHIPPED | OUTPATIENT
Start: 2024-06-10 | End: 2024-06-11

## 2024-06-11 RX ORDER — DEXTROAMPHETAMINE SACCHARATE, AMPHETAMINE ASPARTATE, DEXTROAMPHETAMINE SULFATE AND AMPHETAMINE SULFATE 2.5; 2.5; 2.5; 2.5 MG/1; MG/1; MG/1; MG/1
10 TABLET ORAL 2 TIMES DAILY
Qty: 180 TABLET | Refills: 0 | Status: SHIPPED | OUTPATIENT
Start: 2024-06-11 | End: 2024-09-26

## 2024-06-11 NOTE — TELEPHONE ENCOUNTER
Pt called Requesting pharmacy change as [facility] does not take her new insurance. Pt would like prescription sent to  [address]  [phone]

## 2024-07-01 ENCOUNTER — MYC MEDICAL ADVICE (OUTPATIENT)
Dept: FAMILY MEDICINE | Facility: CLINIC | Age: 34
End: 2024-07-01
Payer: COMMERCIAL

## 2024-07-01 DIAGNOSIS — G56.03 BILATERAL CARPAL TUNNEL SYNDROME: Primary | ICD-10-CM

## 2024-07-02 ENCOUNTER — TRANSFERRED RECORDS (OUTPATIENT)
Dept: HEALTH INFORMATION MANAGEMENT | Facility: CLINIC | Age: 34
End: 2024-07-02
Payer: COMMERCIAL

## 2024-07-23 ENCOUNTER — TRANSFERRED RECORDS (OUTPATIENT)
Dept: HEALTH INFORMATION MANAGEMENT | Facility: CLINIC | Age: 34
End: 2024-07-23
Payer: COMMERCIAL

## 2024-07-31 ENCOUNTER — MYC REFILL (OUTPATIENT)
Dept: FAMILY MEDICINE | Facility: CLINIC | Age: 34
End: 2024-07-31
Payer: COMMERCIAL

## 2024-07-31 DIAGNOSIS — F41.9 ANXIETY: ICD-10-CM

## 2024-07-31 DIAGNOSIS — F33.9 EPISODE OF RECURRENT MAJOR DEPRESSIVE DISORDER, UNSPECIFIED DEPRESSION EPISODE SEVERITY (H): ICD-10-CM

## 2024-07-31 RX ORDER — CITALOPRAM HYDROBROMIDE 40 MG/1
40 TABLET ORAL DAILY
Qty: 90 TABLET | Refills: 1 | Status: SHIPPED | OUTPATIENT
Start: 2024-07-31

## 2024-07-31 RX ORDER — BUPROPION HYDROCHLORIDE 150 MG/1
300 TABLET ORAL EVERY MORNING
Qty: 180 TABLET | Refills: 3 | Status: SHIPPED | OUTPATIENT
Start: 2024-07-31

## 2024-08-05 ENCOUNTER — TRANSFERRED RECORDS (OUTPATIENT)
Dept: HEALTH INFORMATION MANAGEMENT | Facility: CLINIC | Age: 34
End: 2024-08-05
Payer: COMMERCIAL

## 2024-08-20 ENCOUNTER — TRANSFERRED RECORDS (OUTPATIENT)
Dept: HEALTH INFORMATION MANAGEMENT | Facility: CLINIC | Age: 34
End: 2024-08-20
Payer: COMMERCIAL

## 2024-09-17 ENCOUNTER — TRANSFERRED RECORDS (OUTPATIENT)
Dept: HEALTH INFORMATION MANAGEMENT | Facility: CLINIC | Age: 34
End: 2024-09-17
Payer: COMMERCIAL

## 2024-09-21 ENCOUNTER — HEALTH MAINTENANCE LETTER (OUTPATIENT)
Age: 34
End: 2024-09-21

## 2024-09-26 ENCOUNTER — MYC REFILL (OUTPATIENT)
Dept: FAMILY MEDICINE | Facility: CLINIC | Age: 34
End: 2024-09-26
Payer: COMMERCIAL

## 2024-09-26 DIAGNOSIS — F98.8 ATTENTION DEFICIT DISORDER (ADD) WITHOUT HYPERACTIVITY: ICD-10-CM

## 2024-09-26 RX ORDER — DEXTROAMPHETAMINE SACCHARATE, AMPHETAMINE ASPARTATE, DEXTROAMPHETAMINE SULFATE AND AMPHETAMINE SULFATE 2.5; 2.5; 2.5; 2.5 MG/1; MG/1; MG/1; MG/1
10 TABLET ORAL 2 TIMES DAILY
Qty: 180 TABLET | Refills: 0 | Status: SHIPPED | OUTPATIENT
Start: 2024-09-26

## 2024-11-16 DIAGNOSIS — F33.9 EPISODE OF RECURRENT MAJOR DEPRESSIVE DISORDER, UNSPECIFIED DEPRESSION EPISODE SEVERITY (H): ICD-10-CM

## 2024-11-18 RX ORDER — CITALOPRAM HYDROBROMIDE 40 MG/1
40 TABLET ORAL DAILY
Qty: 90 TABLET | Refills: 1 | OUTPATIENT
Start: 2024-11-18

## 2024-12-02 ASSESSMENT — ANXIETY QUESTIONNAIRES
6. BECOMING EASILY ANNOYED OR IRRITABLE: NEARLY EVERY DAY
1. FEELING NERVOUS, ANXIOUS, OR ON EDGE: MORE THAN HALF THE DAYS
4. TROUBLE RELAXING: MORE THAN HALF THE DAYS
8. IF YOU CHECKED OFF ANY PROBLEMS, HOW DIFFICULT HAVE THESE MADE IT FOR YOU TO DO YOUR WORK, TAKE CARE OF THINGS AT HOME, OR GET ALONG WITH OTHER PEOPLE?: VERY DIFFICULT
GAD7 TOTAL SCORE: 14
5. BEING SO RESTLESS THAT IT IS HARD TO SIT STILL: NOT AT ALL
GAD7 TOTAL SCORE: 14
IF YOU CHECKED OFF ANY PROBLEMS ON THIS QUESTIONNAIRE, HOW DIFFICULT HAVE THESE PROBLEMS MADE IT FOR YOU TO DO YOUR WORK, TAKE CARE OF THINGS AT HOME, OR GET ALONG WITH OTHER PEOPLE: VERY DIFFICULT
7. FEELING AFRAID AS IF SOMETHING AWFUL MIGHT HAPPEN: SEVERAL DAYS
GAD7 TOTAL SCORE: 14
2. NOT BEING ABLE TO STOP OR CONTROL WORRYING: NEARLY EVERY DAY
3. WORRYING TOO MUCH ABOUT DIFFERENT THINGS: NEARLY EVERY DAY
7. FEELING AFRAID AS IF SOMETHING AWFUL MIGHT HAPPEN: SEVERAL DAYS

## 2024-12-02 ASSESSMENT — PATIENT HEALTH QUESTIONNAIRE - PHQ9
SUM OF ALL RESPONSES TO PHQ QUESTIONS 1-9: 10
SUM OF ALL RESPONSES TO PHQ QUESTIONS 1-9: 10
10. IF YOU CHECKED OFF ANY PROBLEMS, HOW DIFFICULT HAVE THESE PROBLEMS MADE IT FOR YOU TO DO YOUR WORK, TAKE CARE OF THINGS AT HOME, OR GET ALONG WITH OTHER PEOPLE: VERY DIFFICULT

## 2024-12-03 ENCOUNTER — FCC EXTENDED DOCUMENTATION (OUTPATIENT)
Dept: PSYCHOLOGY | Facility: CLINIC | Age: 34
End: 2024-12-03
Payer: COMMERCIAL

## 2024-12-03 ENCOUNTER — VIRTUAL VISIT (OUTPATIENT)
Dept: PSYCHOLOGY | Facility: CLINIC | Age: 34
End: 2024-12-03
Payer: COMMERCIAL

## 2024-12-03 DIAGNOSIS — F90.0 ATTENTION DEFICIT HYPERACTIVITY DISORDER (ADHD), PREDOMINANTLY INATTENTIVE TYPE: ICD-10-CM

## 2024-12-03 DIAGNOSIS — F33.1 MODERATE EPISODE OF RECURRENT MAJOR DEPRESSIVE DISORDER (H): ICD-10-CM

## 2024-12-03 DIAGNOSIS — F41.1 GAD (GENERALIZED ANXIETY DISORDER): Primary | ICD-10-CM

## 2024-12-03 PROCEDURE — 90837 PSYTX W PT 60 MINUTES: CPT | Mod: 95

## 2024-12-03 ASSESSMENT — COLUMBIA-SUICIDE SEVERITY RATING SCALE - C-SSRS
ATTEMPT LIFETIME: NO
6. HAVE YOU EVER DONE ANYTHING, STARTED TO DO ANYTHING, OR PREPARED TO DO ANYTHING TO END YOUR LIFE?: NO
TOTAL  NUMBER OF INTERRUPTED ATTEMPTS LIFETIME: NO
2. HAVE YOU ACTUALLY HAD ANY THOUGHTS OF KILLING YOURSELF?: NO
TOTAL  NUMBER OF ABORTED OR SELF INTERRUPTED ATTEMPTS LIFETIME: NO
1. HAVE YOU WISHED YOU WERE DEAD OR WISHED YOU COULD GO TO SLEEP AND NOT WAKE UP?: NO

## 2024-12-03 NOTE — PROGRESS NOTES
"[facility]       PATIENT'S NAME: Tracey Crawford  PREFERRED NAME: Tracey  PRONOUNS: she/her/hers  MRN: [id number]  : 1990  ADDRESS: [id number] [place] [id number]  [facility]. NUMBER:  [id number]  DATE OF SERVICE: 2024  START TIME: 10:00 AM  END TIME: 10:53 AM  PREFERRED PHONE: [phone]  May we leave a program related message: Yes  EMERGENCY CONTACT: was obtained .  SERVICE MODALITY:  Video Visit:      Provider verified identity through the following two step process.  Patient provided:  Patient is known previously to provider    Telemedicine Visit: The patient's condition can be safely assessed and treated via synchronous audio and visual telemedicine encounter.      Reason for Telemedicine Visit: Patient has requested telehealth visit    Originating Site (Patient Location): Patient's home    Distant Site (Provider Location): Provider Remote Setting- Home Office    Consent:  The patient/guardian has verbally consented to: the potential risks and benefits of telemedicine (video visit) versus in person care; bill my insurance or make self-payment for services provided; and responsibility for payment of non-covered services.     Patient would like the video invitation sent by:  My Chart    Mode of Communication:  [facility] Conference [facility]    Distant Location (Provider):  Off-site    As the provider I attest to compliance with applicable laws and regulations related to telemedicine.    UNIVERSAL ADULT Mental Health DIAGNOSTIC ASSESSMENT    Identifying Information:  Patient is a 34 year old,   individual.  Patient was referred for an assessment by self.  Patient attended the session alone.    Chief Complaint:   The reason for seeking services at this time is: \" ADHD is my primary thing that I need skills for. \"   The problem(s) began \"about 10-9 years old\". Patient has attempted to resolve these concerns in the past through therapy previously and medication .    Social/Family " "History:  Patient was born in [place], [place] and raised in  [place], [place].  Patient has not moved during childhood.  They were raised by biological parents.  Parents  28 years ago when the patient was 6 years old. The patient mother has been in a relationship for 25 years The patient's father did remarry 15 years ago. Patient reports her dad has passed away.    Patient reported that their childhood was \"Choppy. There were really good times but my parent's divorce had a lot of ups and downs\".  Patient described their current relationships with family of origin as \"My relationship with my sister is tense. My relationship with my mom is really good. My relationship with my brother is good but not close\".      The patient describes their cultural background as \"very family oriented, my dad's side is 100% Slovak. \".  Cultural influences and impact on patient's life structure, values, norms, and healthcare: Spiritual Beliefs: \"I was raised Buddhism I believe in Dog and I really enjoy going to [facility] for Buddhist\" .  Contextual influences on patient's health include: Contextual Factors: Family Factors patient's dad passed away .  Cultural, Contextual, and socioeconomic factors do not affect the patient's access to services.  These factors will be addressed in the Preliminary Treatment plan.  Patient identified their preferred language to be English. Patient reported they do not  need the assistance of an  or other support involved in therapy.     Patient reported had no significant delays in developmental tasks.   Patient's highest education level was graduate school. Patient identified the following learning problems: reading and writing.  Modifications will not be used to assist communication in therapy.   Patient reports they are  able to understand written materials.    Patient reported the following relationship history 1 marriage.  Patient's current relationship status is  for 10 " years.   Patient identified their sexual orientation as heterosexual.  Patient reported having two child(misty). Patient identified mother and cousin  as part of their support system.  Patient identified the quality of these relationships as stable and meaningful.     Patient's current living/housing situation involves staying in own home/apartment.  They live with  and daughters and they report that housing is stable.     Patient is currently unemployed.  Patient reports their finances are obtained through spouse.  Patient does identify finances as a current stressor.      Patient reported that they have not been involved with the legal system.  Patient denies being on probation / parole / under the jurisdiction of the court.    Patient's Strengths and Limitations:  Patient identified the following strengths or resources that will help them succeed in treatment: Cheondoism / Buddhist, mariano / spirituality, family support, and intelligence. Things that may interfere with the patient's success in treatment include: financial hardship.     Assessments:  The following assessments were completed by patient for this visit:  PHQ9:       8/27/2020    11:00 AM 5/13/2021     2:00 PM 5/26/2022     3:03 PM 2/27/2023     9:42 AM 10/25/2023     5:51 PM 12/2/2024     1:37 PM   PHQ-9 SCORE   PHQ-9 Total Score MyChart   3 (Minimal depression) 8 (Mild depression) 7 (Mild depression) 10 (Moderate depression)   PHQ-9 Total Score 1 4 3 8 7 10        Patient-reported     GAD7:       8/27/2020    11:00 AM 5/13/2021     2:00 PM 5/26/2022     3:04 PM 2/27/2023     9:42 AM 12/2/2024     2:04 PM   LOGAN-7 SCORE   Total Score   9 (mild anxiety) 10 (moderate anxiety) 14 (moderate anxiety)   Total Score 3 4 9 10 14        Patient-reported     PROMIS 10-Global Health (only subscores and total score):       12/2/2024     2:06 PM   PROMIS-10 Scores Only   Global Mental Health Score 7    Global Physical Health Score 14    PROMIS TOTAL - SUBSCORES 21  "       Patient-reported       Personal and Family Medical History:  Patient does report a family history of mental health concerns.  Patient reports family history includes Alcoholism in her father; Cancer in her paternal grandfather; Depression in her brother, mother, and sister; Diabetes in her father and paternal grandfather; Eczema in her sister; Hypertension in her father and paternal grandfather.. Patient reports her mother has a diagnosis of OCD, ADHD, and anxiety. Patient reports her mother \"struggles with alcohol\". Patient reports her father \"struggled with cocaine and alcohol\".     Patient does report Mental Health Diagnosis and/or Treatment.  Patient reported the following previous diagnoses which include(s): ADHD; an anxiety disorder; depression .  Patient reported symptoms began in middle school.  Patient has received mental health services in the past:  therapy; psychiatry  . Hospitalizations: none.Patient denies a history of civil commitment.      Currently, patient none  is receiving other mental health services.  These include none.       Patient has had a physical exam to rule out medical causes for current symptoms.  Date of last physical exam was within the past year. Client was encouraged to follow up with PCP if symptoms were to develop. The patient has a [place] Primary Care Provider, who is named Alecia Esteban.  Patient reports no current medical concerns.  Patient denies any issues with pain. There are not significant appetite / nutritional concerns / weight changes. These may include: no concerns. Patient reports the following sleep concerns:  Delayed sleep onset difficulty falling asleep.   Patient does not report a history of head injury / trauma / cognitive impairment.      Patient reports current meds as:   Current Outpatient Medications   Medication Sig Dispense Refill    albuterol (PROAIR HFA/PROVENTIL HFA/VENTOLIN HFA) 108 (90 Base) MCG/ACT inhaler Inhale 2 puffs into the " lungs.      amphetamine-dextroamphetamine (ADDERALL) 10 MG tablet Take 1 tablet (10 mg) by mouth 2 times daily. 180 tablet 0    buPROPion (WELLBUTRIN XL) 150 MG 24 hr tablet Take 2 tablets (300 mg) by mouth every morning 180 tablet 3    citalopram (CELEXA) 40 MG tablet Take 1 tablet (40 mg) by mouth daily 90 tablet 1    Multiple Vitamins-Minerals (ONCOVITE) TABS Take 1 tablet by mouth       No current facility-administered medications for this visit.       Medication Adherence:  Patient reports taking psychiatric medications as prescribed.    Patient Allergies:  No Known Allergies    Medical History:    Past Medical History:   Diagnosis Date    Labor and delivery affected by shoulder dystocia 2019    S/P primary low transverse  2017    Stiffness of joint, not elsewhere classified, hand     right 5th finger    Vaginal delivery          Current Mental Status Exam:   Appearance:  Appropriate    Eye Contact:  Good   Psychomotor:  Normal       Gait / station:  no problem  Attitude / Demeanor: Cooperative   Speech      Rate / Production: Normal/ Responsive      Volume:  Normal       Language:  no problems  Mood:   Anxious  Depressed   Affect:   Worrisome    Thought Content: Rumination   Thought Process: Coherent       Associations: No loosening of associations  Insight:   Good   Judgment:  Intact   Orientation:  All  Attention/concentration: Good    Substance Use:   Patient did report a family history of substance use concerns; see medical history section for details.  Patient has not received chemical dependency treatment in the past.  Patient has not ever been to detox.      Patient is not currently receiving any chemical dependency treatment. Patient reported the following problems as a result of their substance use:   none .    Patient reports using alcohol 1 times per week and has 4 mixed drinks at a time. Patient first started drinking at age 16.  Patient reported date of last use was 12/3/2024.   Patient reports heaviest use was past.  Patient denies any symptoms of withdrawal..  Patient has never had a period of abstinence.. and reports using caffeine 2 times per week and drinks 1 at a time. Patient started using caffeine at age 10.    Substance Use: No symptoms    Based on the CAGE score of 1 and clinical interview there  are not indications of drug or alcohol abuse.    Significant Losses / Trauma / Abuse / Neglect Issues:   Patient did not serve in the .  There are indications or report of significant loss, trauma, abuse or neglect issues related to: death of grandparents and dad .  Concerns for possible neglect are not present.    Safety Assessment:   Patient denies current homicidal ideation and behaviors.  Patient denies current self-injurious ideation and behaviors.    Patient denied risk behaviors associated with substance use.   Patient denies any high risk behaviors associated with mental health symptoms.  Patient reports the following current concerns for their personal safety: None.  Patient reports there are firearms in the house.     yes, they are secured.     History of Safety Concerns:  Patient denied a history of homicidal ideation.     Patient denied a history of personal safety concerns.    Patient denied a history of assaultive behaviors.    Patient denied a history of sexual assault behaviors.     Patient denied a history of risk behaviors associated with substance use.  Patient denies any history of high risk behaviors associated with mental health symptoms.  Patient reports the following protective factors:  dedication to family or friends; safe and stable environment; sense of belonging; purpose; help seeking behaviors when distressed; adherence with prescribed medication; structured day; sense of meaning; healthy fear of risky behaviors or pain    Vulnerability Assessment:    Does the patient have a history of vulnerability such as being teased, picked on, or other  indications of potential safety issues with others ?  No    Does this patient have a history of being the victim of abuse? No history of abuse reported or documented.    Does this patient have a history of victimizing others or physical/sexual aggression? No     Does the patient have a history of boundary violations?  No.    Does the patient have a history of other sexual acting out behaviors (e.g grooming)?   No    Does the patient have a history of threats to self or others? Fire setting, running away or other self-injurious behaviors?    No    Has the patient required holds or restraints to manage behavior?  No    Does the patient s history indicate the need for special precautions or particular staffing patterns in the facility?  No      NOTE: If this screening indicates that the patient is at risk to harm self or others, notify staff at referral location.    Risk Plan:  See Recommendations for Safety and Risk Management Plan    Review of Symptoms per patient report:   Depression: Lack of interest or pleasure in doing things, Feeling sad, down, or depressed, Feelings of hopelessness, Change in energy level, Low self-worth, Difficulties concentrating, Feelings of helplessness, Ruminations, Irritability, Withdrawn, Poor hygeine, and Anger outbursts  Bertha:  No Symptoms  Psychosis: No Symptoms  Anxiety: Excessive worry, Nervousness, Ruminations, Poor concentration, Irritability, and Anger outbursts  Panic:  No symptoms  Post Traumatic Stress Disorder:  No Symptoms   Eating Disorder: No Symptoms  ADD / ADHD:  Inattentive, Difficulties listening, Poor task completion, Distractibility, Forgetful, and Impulsive  Conduct Disorder: No symptoms  Autism Spectrum Disorder: No symptoms  Obsessive Compulsive Disorder: No Symptoms    Patient reports the following compulsive behaviors and treatment history:  none .      Diagnostic Criteria:   Unspecified Anxiety Disorder , Symptoms characteristic of an anxiety disorder that  caused clinically significant distress or impairment in social, occupational, or other important areas of functioning predominate but do not meet the full criteria for any of the disorders of the anxiety disorders diagnostic class. Major Depressive Disorder  CRITERIA (A-C) REPRESENT A MAJOR DEPRESSIVE EPISODE - SELECT THESE CRITERIA  A) Recurrent episode(s) - symptoms have been present during the same 2-week period and represent a change from previous functioning 5 or more symptoms (required for diagnosis)   - Depressed mood. Note: In children and adolescents, can be irritable mood.     - Diminished interest or pleasure in all, or almost all, activities.    - Fatigue or loss of energy.    - Feelings of worthlessness or inappropriate guilt.    - Diminished ability to think or concentrate, or indecisiveness.   B) The symptoms cause clinically significant distress or impairment in social, occupational, or other important areas of functioning  C) The episode is not attributable to the physiological effects of a substance or to another medical condition  D) The occurence of major depressive episode is not better explained by other thought / psychotic disorders  E) There has never been a manic episode or hypomanic episode    Functional Status:  Patient reports the following functional impairments:  childcare / parenting, management of the household and or completion of tasks, organization, relationship(s), self-care, and social interactions.     Nonprogrammatic care:  Patient is requesting basic services to address current mental health concerns.    Clinical Summary:  1. Psychosocial, Cultural and Contextual Factors: patient is a mother and wife. Patient is currently unemployed. Patient lives with her  and children.   2. Principal DSM5 Diagnoses  (Sustained by DSM5 Criteria Listed Above):   296.32 (F33.1) Major Depressive Disorder, Recurrent Episode, Moderate _  300.09 (F41.8) Other Specified Anxiety Disorder .  3.  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  4. Provisional Diagnosis:  none.  5. Prognosis: Expect Improvement.  6. Likely consequences of symptoms if not treated: increase in symptoms of ADHD, depression and anxiety.=.  7. Client strengths include:  caring, educated, empathetic, intelligent, open to learning, open to suggestions / feedback, responsible parent, support of family, friends and providers, wants to learn, willing to ask questions, and willing to relate to others .     Recommendations:     1. Plan for Safety and Risk Management:   Safety and Risk: Recommended that patient call 911 or go to the local ED should there be a change in any of these risk factors.          Report to child / adult protection services was NA.     2. Patient's identified mariano / Tenriism / spiritual influences will be incorporated into care by will continue to monitor as necessary .     3. Initial Treatment will focus on:    Depressed Mood - depressed mood and low self-worth  Anxiety - rumination .     4. Resources/Service Plan:    services are not indicated.   Modifications to assist communication are not indicated.   Additional disability accommodations are not indicated.      5. Collaboration:   Collaboration / coordination of treatment will be initiated with the following  support professionals: primary care physician.      6.  Referrals:   The following referral(s) will be initiated:  none .       A Release of Information has been obtained for the following:  none .     Clinical Substantiation/medical necessity for the above recommendations:  to reduce symptoms of anxiety, depression and ADHD.    7. GRICELDA:    GRICELDA:  Discussed the general effects of drugs and alcohol on health and well-being. Provider gave patient printed information about the  effects of chemical use on their health and well being. Recommendations:   will continue to monitor as necessary .     8. Records:   These were reviewed at time of assessment.   Information in this assessment was obtained from the medical record and  provided by patient who is a good historian.    Patient will have open access to their mental health medical record.    9.   Interactive Complexity: No      Provider Name/ Credentials:  SEAN Haines  December 31, 2024

## 2024-12-03 NOTE — PROGRESS NOTES
[facility]                                     Progress Note    Patient Name: Tracey Crawford  Date: 12/3/2024         Service Type: Individual      Session Start Time: 10:00 AM  Session End Time: 10:59 AM     Session Length: 53+ min    Session #: 1    Attendees: Client attended alone    Service Modality:  Video Visit:      Provider verified identity through the following two step process.  Patient provided:  Patient  and Patient address    Telemedicine Visit: The patient's condition can be safely assessed and treated via synchronous audio and visual telemedicine encounter.      Reason for Telemedicine Visit: Patient has requested telehealth visit    Originating Site (Patient Location): Patient's home    Distant Site (Provider Location): Provider Remote Setting- Home Office    Consent:  The patient/guardian has verbally consented to: the potential risks and benefits of telemedicine (video visit) versus in person care; bill my insurance or make self-payment for services provided; and responsibility for payment of non-covered services.     Patient would like the video invitation sent by:  My Chart    Mode of Communication:  [facility]    Distant Location (Provider):  Off-site    As the provider I attest to compliance with applicable laws and regulations related to telemedicine.    DATA  Extended Session (53+ minutes): PROLONGED SERVICE IN THE OUTPATIENT SETTING REQUIRING DIRECT (FACE-TO-FACE) PATIENT CONTACT BEYOND THE USUAL SERVICE:    - Patient's presenting concerns require more intensive intervention than could be completed within the usual service  Interactive Complexity: No  Crisis: No        Progress Since Last Session (Related to Symptoms / Goals / Homework):   Symptoms:  This was the patient's first session.     Homework:  This was the patient's first session.       Episode of Care Goals:  This was the patient's first session.      Current / Ongoing Stressors and  Concerns:   Patient is a mom and a wife. Patient is not currently working.      Treatment Objective(s) Addressed in This Session:   This was the patient's first session.      Intervention:   Motivational Interviewing: client centered interview focusing on assessing stages of change.     Assessments completed prior to visit:  The following assessments were completed by patient for this visit:  PHQ9:       8/27/2020    11:00 AM 5/13/2021     2:00 PM 5/26/2022     3:03 PM 2/27/2023     9:42 AM 10/25/2023     5:51 PM 12/2/2024     1:37 PM   PHQ-9 SCORE   PHQ-9 Total Score MyChart   3 (Minimal depression) 8 (Mild depression) 7 (Mild depression) 10 (Moderate depression)   PHQ-9 Total Score 1 4 3 8 7 10        Patient-reported     GAD7:       8/27/2020    11:00 AM 5/13/2021     2:00 PM 5/26/2022     3:04 PM 2/27/2023     9:42 AM 12/2/2024     2:04 PM   LOGAN-7 SCORE   Total Score   9 (mild anxiety) 10 (moderate anxiety) 14 (moderate anxiety)   Total Score 3 4 9 10 14        Patient-reported     PROMIS 10-Global Health (only subscores and total score):       12/2/2024     2:06 PM   PROMIS-10 Scores Only   Global Mental Health Score 7    Global Physical Health Score 14    PROMIS TOTAL - SUBSCORES 21        Patient-reported         ASSESSMENT: Current Emotional / Mental Status (status of significant symptoms):   Risk status (Self / Other harm or suicidal ideation)   Patient denies current fears or concerns for personal safety.   Patient denies current or recent suicidal ideation or behaviors.   Patient denies current or recent homicidal ideation or behaviors.   Patient denies current or recent self injurious behavior or ideation.   Patient denies other safety concerns.   Patient reports there has been no change in risk factors since their last session.     Patient reports there has been no change in protective factors since their last session.     Recommended that patient call 911 or go to the local ED should there be a change  in any of these risk factors     Appearance:   Appropriate    Eye Contact:   Good    Psychomotor Behavior: Normal    Attitude:   Cooperative    Orientation:   All   Speech    Rate / Production: Normal     Volume:  Normal    Mood:    Anxious  Depressed    Affect:    Worrisome    Thought Content:  Rumination    Thought Form:  Coherent    Insight:    Good      Medication Review:   No changes to current psychiatric medication(s)     Medication Compliance:   Yes     Changes in Health Issues:   None reported     Chemical Use Review:   Substance Use: Chemical use reviewed, no active concerns identified      Tobacco Use: No current tobacco use.      Diagnosis:  1. LOGAN (generalized anxiety disorder)    2. Moderate episode of recurrent major depressive disorder (H)    3. Attention deficit hyperactivity disorder (ADHD), predominantly inattentive type        Collateral Reports Completed:   Routed note to PCP    PLAN: (Patient Tasks / Therapist Tasks / Other)  Patient will think of attainable therapeutic goals related to overarching goals identified in session. We will discuss next session.         Virginie Rosado, [facility] 12/3/2024                                                         ______________________________________________________________________

## 2024-12-17 ENCOUNTER — TELEPHONE (OUTPATIENT)
Dept: PSYCHOLOGY | Facility: CLINIC | Age: 34
End: 2024-12-17
Payer: COMMERCIAL

## 2024-12-17 NOTE — TELEPHONE ENCOUNTER
Provider called patient to discuss session on 12/31. Provider gave patient the option to confirm this appointment via [employer] message or phone call. Gave provider's office phone number for call back.

## 2024-12-18 DIAGNOSIS — F98.8 ATTENTION DEFICIT DISORDER (ADD) WITHOUT HYPERACTIVITY: ICD-10-CM

## 2024-12-18 RX ORDER — DEXTROAMPHETAMINE SACCHARATE, AMPHETAMINE ASPARTATE, DEXTROAMPHETAMINE SULFATE AND AMPHETAMINE SULFATE 2.5; 2.5; 2.5; 2.5 MG/1; MG/1; MG/1; MG/1
10 TABLET ORAL 2 TIMES DAILY
Qty: 180 TABLET | Refills: 0 | Status: SHIPPED | OUTPATIENT
Start: 2024-12-18

## 2024-12-31 ENCOUNTER — VIRTUAL VISIT (OUTPATIENT)
Dept: PSYCHOLOGY | Facility: CLINIC | Age: 34
End: 2024-12-31
Payer: COMMERCIAL

## 2024-12-31 DIAGNOSIS — F41.9 ANXIETY DISORDER, UNSPECIFIED TYPE: ICD-10-CM

## 2024-12-31 DIAGNOSIS — F33.1 MODERATE EPISODE OF RECURRENT MAJOR DEPRESSIVE DISORDER (H): Primary | ICD-10-CM

## 2024-12-31 DIAGNOSIS — F90.0 ATTENTION DEFICIT HYPERACTIVITY DISORDER (ADHD), PREDOMINANTLY INATTENTIVE TYPE: ICD-10-CM

## 2024-12-31 PROCEDURE — 90791 PSYCH DIAGNOSTIC EVALUATION: CPT | Mod: 95

## 2024-12-31 NOTE — PROGRESS NOTES
"[facility]       PATIENT'S NAME: Tracey Crawford  PREFERRED NAME: Tracey  PRONOUNS: she/her/hers  MRN: [id number]  : 1990  ADDRESS: [id number] [place] [id number]  [facility]. NUMBER:  [id number]  DATE OF SERVICE: 2024  START TIME: 10:00 AM  END TIME: 10:53 AM  PREFERRED PHONE: [phone]  May we leave a program related message: Yes  EMERGENCY CONTACT: was obtained .  SERVICE MODALITY:  Video Visit:      Provider verified identity through the following two step process.  Patient provided:  Patient is known previously to provider    Telemedicine Visit: The patient's condition can be safely assessed and treated via synchronous audio and visual telemedicine encounter.      Reason for Telemedicine Visit: Patient has requested telehealth visit    Originating Site (Patient Location): Patient's home    Distant Site (Provider Location): Provider Remote Setting- Home Office    Consent:  The patient/guardian has verbally consented to: the potential risks and benefits of telemedicine (video visit) versus in person care; bill my insurance or make self-payment for services provided; and responsibility for payment of non-covered services.     Patient would like the video invitation sent by:  My Chart    Mode of Communication:  Video Conference via [facility]    Distant Location (Provider):  Off-site    As the provider I attest to compliance with applicable laws and regulations related to telemedicine.    UNIVERSAL ADULT Mental Health DIAGNOSTIC ASSESSMENT    Identifying Information:  Patient is a 34 year old,   individual.  Patient was referred for an assessment by self.  Patient attended the session alone.    Chief Complaint:   The reason for seeking services at this time is: \" ADHD is my primary thing that I need skills for. \"   The problem(s) began \"about 10-9 years old\". Patient has attempted to resolve these concerns in the past through therapy previously and medication .    Social/Family " "History:  Patient was born in [place], [place] and raised in  [place], [place].  Patient has not moved during childhood.  They were raised by biological parents.  Parents  28 years ago when the patient was 6 years old. The patient mother has been in a relationship for 25 years The patient's father did remarry 15 years ago. Patient reports her dad has passed away.    Patient reported that their childhood was \"Choppy. There were really good times but my parent's divorce had a lot of ups and downs\".  Patient described their current relationships with family of origin as \"My relationship with my sister is tense. My relationship with my mom is really good. My relationship with my brother is good but not close\".      The patient describes their cultural background as \"very family oriented, my dad's side is 100% Burkinan. \".  Cultural influences and impact on patient's life structure, values, norms, and healthcare: Spiritual Beliefs: \"I was raised Jain I believe in Dog and I really enjoy going to [facility]\" .  Contextual influences on patient's health include: Contextual Factors: Family Factors patient's dad passed away .  Cultural, Contextual, and socioeconomic factors do not affect the patient's access to services.  These factors will be addressed in the Preliminary Treatment plan.  Patient identified their preferred language to be English. Patient reported they do not  need the assistance of an  or other support involved in therapy.     Patient reported had no significant delays in developmental tasks.   Patient's highest education level was graduate school. Patient identified the following learning problems: reading and writing.  Modifications will not be used to assist communication in therapy.   Patient reports they are  able to understand written materials.    Patient reported the following relationship history 1 marriage.  Patient's current relationship status is  for 10 " years.   Patient identified their sexual orientation as heterosexual.  Patient reported having two child(misty). Patient identified mother and cousin  as part of their support system.  Patient identified the quality of these relationships as stable and meaningful.     Patient's current living/housing situation involves staying in own home/apartment.  They live with  and daughters and they report that housing is stable.     Patient is currently unemployed.  Patient reports their finances are obtained through spouse.  Patient does identify finances as a current stressor.      Patient reported that they have not been involved with the legal system.  Patient denies being on probation / parole / under the jurisdiction of the court.    Patient's Strengths and Limitations:  Patient identified the following strengths or resources that will help them succeed in treatment: Latter-day / Jew, mariano / spirituality, family support, and intelligence. Things that may interfere with the patient's success in treatment include: financial hardship.     Assessments:  The following assessments were completed by patient for this visit:  PHQ9:       8/27/2020    11:00 AM 5/13/2021     2:00 PM 5/26/2022     3:03 PM 2/27/2023     9:42 AM 10/25/2023     5:51 PM 12/2/2024     1:37 PM   PHQ-9 SCORE   PHQ-9 Total Score MyChart   3 (Minimal depression) 8 (Mild depression) 7 (Mild depression) 10 (Moderate depression)   PHQ-9 Total Score 1 4 3 8 7 10        Patient-reported     GAD7:       8/27/2020    11:00 AM 5/13/2021     2:00 PM 5/26/2022     3:04 PM 2/27/2023     9:42 AM 12/2/2024     2:04 PM   LOGAN-7 SCORE   Total Score   9 (mild anxiety) 10 (moderate anxiety) 14 (moderate anxiety)   Total Score 3 4 9 10 14        Patient-reported     PROMIS 10-Global Health (only subscores and total score):       12/2/2024     2:06 PM   PROMIS-10 Scores Only   Global Mental Health Score 7    Global Physical Health Score 14    PROMIS TOTAL - SUBSCORES 21  "       Patient-reported       Personal and Family Medical History:  Patient does report a family history of mental health concerns.  Patient reports family history includes Alcoholism in her father; Cancer in her paternal grandfather; Depression in her brother, mother, and sister; Diabetes in her father and paternal grandfather; Eczema in her sister; Hypertension in her father and paternal grandfather.. Patient reports her mother has a diagnosis of OCD, ADHD, and anxiety. Patient reports her mother \"struggles with alcohol\". Patient reports her father \"struggled with cocaine and alcohol\".     Patient does report Mental Health Diagnosis and/or Treatment.  Patient reported the following previous diagnoses which include(s): ADHD; an anxiety disorder; depression .  Patient reported symptoms began in middle school.  Patient has received mental health services in the past:  therapy; psychiatry  . Hospitalizations: none.Patient denies a history of civil commitment.      Currently, patient none  is receiving other mental health services.  These include none.       Patient has had a physical exam to rule out medical causes for current symptoms.  Date of last physical exam was within the past year. Client was encouraged to follow up with PCP if symptoms were to develop. The patient has a [facility] Provider, who is named Alecia Esteban.  Patient reports no current medical concerns.  Patient denies any issues with pain. There are not significant appetite / nutritional concerns / weight changes. These may include: no concerns. Patient reports the following sleep concerns:  Delayed sleep onset difficulty falling asleep.   Patient does not report a history of head injury / trauma / cognitive impairment.      Patient reports current meds as:   Current Outpatient Medications   Medication Sig Dispense Refill    albuterol (PROAIR HFA/PROVENTIL HFA/VENTOLIN HFA) 108 (90 Base) MCG/ACT inhaler Inhale 2 puffs into the " lungs.      amphetamine-dextroamphetamine (ADDERALL) 10 MG tablet Take 1 tablet (10 mg) by mouth 2 times daily. 180 tablet 0    buPROPion (WELLBUTRIN XL) 150 MG 24 hr tablet Take 2 tablets (300 mg) by mouth every morning 180 tablet 3    citalopram (CELEXA) 40 MG tablet Take 1 tablet (40 mg) by mouth daily 90 tablet 1    Multiple Vitamins-Minerals (ONCOVITE) TABS Take 1 tablet by mouth       No current facility-administered medications for this visit.       Medication Adherence:  Patient reports taking psychiatric medications as prescribed.    Patient Allergies:  No Known Allergies    Medical History:    Past Medical History:   Diagnosis Date    Labor and delivery affected by shoulder dystocia 2019    S/P primary low transverse  2017    Stiffness of joint, not elsewhere classified, hand     right 5th finger    Vaginal delivery          Current Mental Status Exam:   Appearance:  Appropriate    Eye Contact:  Good   Psychomotor:  Normal       Gait / station:  no problem  Attitude / Demeanor: Cooperative   Speech      Rate / Production: Normal/ Responsive      Volume:  Normal       Language:  no problems  Mood:   Anxious  Depressed   Affect:   Worrisome    Thought Content: Rumination   Thought Process: Coherent       Associations: No loosening of associations  Insight:   Good   Judgment:  Intact   Orientation:  All  Attention/concentration: Good    Substance Use:   Patient did report a family history of substance use concerns; see medical history section for details.  Patient has not received chemical dependency treatment in the past.  Patient has not ever been to detox.      Patient is not currently receiving any chemical dependency treatment. Patient reported the following problems as a result of their substance use:   none .    Patient reports using alcohol 1 times per week and has 4 mixed drinks at a time. Patient first started drinking at age 16.  Patient reported date of last use was 12/3/2024.   Patient reports heaviest use was past.  Patient denies any symptoms of withdrawal..  Patient has never had a period of abstinence.. and reports using caffeine 2 times per week and drinks 1 at a time. Patient started using caffeine at age 10.    Substance Use: No symptoms    Based on the CAGE score of 1 and clinical interview there  are not indications of drug or alcohol abuse.    Significant Losses / Trauma / Abuse / Neglect Issues:   Patient did not serve in the .  There are indications or report of significant loss, trauma, abuse or neglect issues related to: death of grandparents and dad .  Concerns for possible neglect are not present.    Safety Assessment:   Patient denies current homicidal ideation and behaviors.  Patient denies current self-injurious ideation and behaviors.    Patient denied risk behaviors associated with substance use.   Patient denies any high risk behaviors associated with mental health symptoms.  Patient reports the following current concerns for their personal safety: None.  Patient reports there are firearms in the house.     yes, they are secured.     History of Safety Concerns:  Patient denied a history of homicidal ideation.     Patient denied a history of personal safety concerns.    Patient denied a history of assaultive behaviors.    Patient denied a history of sexual assault behaviors.     Patient denied a history of risk behaviors associated with substance use.  Patient denies any history of high risk behaviors associated with mental health symptoms.  Patient reports the following protective factors:  dedication to family or friends; safe and stable environment; sense of belonging; purpose; help seeking behaviors when distressed; adherence with prescribed medication; structured day; sense of meaning; healthy fear of risky behaviors or pain    Vulnerability Assessment:    Does the patient have a history of vulnerability such as being teased, picked on, or other  indications of potential safety issues with others ?  No    Does this patient have a history of being the victim of abuse? No history of abuse reported or documented.    Does this patient have a history of victimizing others or physical/sexual aggression? No     Does the patient have a history of boundary violations?  No.    Does the patient have a history of other sexual acting out behaviors (e.g grooming)?   No    Does the patient have a history of threats to self or others? Fire setting, running away or other self-injurious behaviors?    No    Has the patient required holds or restraints to manage behavior?  No    Does the patient s history indicate the need for special precautions or particular staffing patterns in the facility?  No      NOTE: If this screening indicates that the patient is at risk to harm self or others, notify staff at referral location.    Risk Plan:  See Recommendations for Safety and Risk Management Plan    Review of Symptoms per patient report:   Depression: Lack of interest or pleasure in doing things, Feeling sad, down, or depressed, Feelings of hopelessness, Change in energy level, Low self-worth, Difficulties concentrating, Feelings of helplessness, Ruminations, Irritability, Withdrawn, Poor hygeine, and Anger outbursts  Bertha:  No Symptoms  Psychosis: No Symptoms  Anxiety: Excessive worry, Nervousness, Ruminations, Poor concentration, Irritability, and Anger outbursts  Panic:  No symptoms  Post Traumatic Stress Disorder:  No Symptoms   Eating Disorder: No Symptoms  ADD / ADHD:  Inattentive, Difficulties listening, Poor task completion, Distractibility, Forgetful, and Impulsive  Conduct Disorder: No symptoms  Autism Spectrum Disorder: No symptoms  Obsessive Compulsive Disorder: No Symptoms    Patient reports the following compulsive behaviors and treatment history:  none .      Diagnostic Criteria:   Unspecified Anxiety Disorder , Symptoms characteristic of an anxiety disorder that  caused clinically significant distress or impairment in social, occupational, or other important areas of functioning predominate but do not meet the full criteria for any of the disorders of the anxiety disorders diagnostic class. Major Depressive Disorder  CRITERIA (A-C) REPRESENT A MAJOR DEPRESSIVE EPISODE - SELECT THESE CRITERIA  A) Recurrent episode(s) - symptoms have been present during the same 2-week period and represent a change from previous functioning 5 or more symptoms (required for diagnosis)   - Depressed mood. Note: In children and adolescents, can be irritable mood.     - Diminished interest or pleasure in all, or almost all, activities.    - Fatigue or loss of energy.    - Feelings of worthlessness or inappropriate guilt.    - Diminished ability to think or concentrate, or indecisiveness.   B) The symptoms cause clinically significant distress or impairment in social, occupational, or other important areas of functioning  C) The episode is not attributable to the physiological effects of a substance or to another medical condition  D) The occurence of major depressive episode is not better explained by other thought / psychotic disorders  E) There has never been a manic episode or hypomanic episode    Functional Status:  Patient reports the following functional impairments:  childcare / parenting, management of the household and or completion of tasks, organization, relationship(s), self-care, and social interactions.     Nonprogrammatic care:  Patient is requesting basic services to address current mental health concerns.    Clinical Summary:  1. Psychosocial, Cultural and Contextual Factors: patient is a mother and wife. Patient is currently unemployed. Patient lives with her  and children.   2. Principal DSM5 Diagnoses  (Sustained by DSM5 Criteria Listed Above):   296.32 (F33.1) Major Depressive Disorder, Recurrent Episode, Moderate _  300.09 (F41.8) Other Specified Anxiety Disorder .  3.  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  4. Provisional Diagnosis:  none.  5. Prognosis: Expect Improvement.  6. Likely consequences of symptoms if not treated: increase in symptoms of ADHD, depression and anxiety.=.  7. Client strengths include:  caring, educated, empathetic, intelligent, open to learning, open to suggestions / feedback, responsible parent, support of family, friends and providers, wants to learn, willing to ask questions, and willing to relate to others .     Recommendations:     1. Plan for Safety and Risk Management:   Safety and Risk: Recommended that patient call 911 or go to the local ED should there be a change in any of these risk factors.          Report to child / adult protection services was NA.     2. Patient's identified mariano / Yarsani / spiritual influences will be incorporated into care by will continue to monitor as necessary .     3. Initial Treatment will focus on:    Depressed Mood - depressed mood and low self-worth  Anxiety - rumination .     4. Resources/Service Plan:    services are not indicated.   Modifications to assist communication are not indicated.   Additional disability accommodations are not indicated.      5. Collaboration:   Collaboration / coordination of treatment will be initiated with the following  support professionals: primary care physician.      6.  Referrals:   The following referral(s) will be initiated:  none .       A Release of Information has been obtained for the following:  none .     Clinical Substantiation/medical necessity for the above recommendations:  to reduce symptoms of anxiety, depression and ADHD.    7. GRICELDA:    GRICELDA:  Discussed the general effects of drugs and alcohol on health and well-being. Provider gave patient printed information about the  effects of chemical use on their health and well being. Recommendations:   will continue to monitor as necessary .     8. Records:   These were reviewed at time of assessment.   Information in this assessment was obtained from the medical record and  provided by patient who is a good historian.    Patient will have open access to their mental health medical record.    9.   Interactive Complexity: No      Provider Name/ Credentials:  SEAN Haines  December 31, 2024

## 2025-01-16 ENCOUNTER — VIRTUAL VISIT (OUTPATIENT)
Dept: PSYCHOLOGY | Facility: CLINIC | Age: 35
End: 2025-01-16
Payer: COMMERCIAL

## 2025-01-16 DIAGNOSIS — Z53.9 NO SHOW: Primary | ICD-10-CM

## 2025-01-16 NOTE — PROGRESS NOTES
Provider called patient to see if she was still available for her 10 AM session. Provider gave patient the phone number for behavioral access. Provider reminded patient of her next scheduled session.

## 2025-01-24 ASSESSMENT — ANXIETY QUESTIONNAIRES
5. BEING SO RESTLESS THAT IT IS HARD TO SIT STILL: NOT AT ALL
3. WORRYING TOO MUCH ABOUT DIFFERENT THINGS: MORE THAN HALF THE DAYS
1. FEELING NERVOUS, ANXIOUS, OR ON EDGE: MORE THAN HALF THE DAYS
7. FEELING AFRAID AS IF SOMETHING AWFUL MIGHT HAPPEN: NOT AT ALL
GAD7 TOTAL SCORE: 9
8. IF YOU CHECKED OFF ANY PROBLEMS, HOW DIFFICULT HAVE THESE MADE IT FOR YOU TO DO YOUR WORK, TAKE CARE OF THINGS AT HOME, OR GET ALONG WITH OTHER PEOPLE?: SOMEWHAT DIFFICULT
2. NOT BEING ABLE TO STOP OR CONTROL WORRYING: SEVERAL DAYS
6. BECOMING EASILY ANNOYED OR IRRITABLE: MORE THAN HALF THE DAYS
IF YOU CHECKED OFF ANY PROBLEMS ON THIS QUESTIONNAIRE, HOW DIFFICULT HAVE THESE PROBLEMS MADE IT FOR YOU TO DO YOUR WORK, TAKE CARE OF THINGS AT HOME, OR GET ALONG WITH OTHER PEOPLE: SOMEWHAT DIFFICULT
GAD7 TOTAL SCORE: 9
4. TROUBLE RELAXING: MORE THAN HALF THE DAYS

## 2025-01-28 ASSESSMENT — PATIENT HEALTH QUESTIONNAIRE - PHQ9
10. IF YOU CHECKED OFF ANY PROBLEMS, HOW DIFFICULT HAVE THESE PROBLEMS MADE IT FOR YOU TO DO YOUR WORK, TAKE CARE OF THINGS AT HOME, OR GET ALONG WITH OTHER PEOPLE: NOT DIFFICULT AT ALL
SUM OF ALL RESPONSES TO PHQ QUESTIONS 1-9: 0
SUM OF ALL RESPONSES TO PHQ QUESTIONS 1-9: 0

## 2025-01-29 ENCOUNTER — VIRTUAL VISIT (OUTPATIENT)
Dept: PSYCHOLOGY | Facility: CLINIC | Age: 35
End: 2025-01-29
Payer: COMMERCIAL

## 2025-01-29 DIAGNOSIS — F41.9 ANXIETY DISORDER, UNSPECIFIED TYPE: ICD-10-CM

## 2025-01-29 DIAGNOSIS — F90.0 ATTENTION DEFICIT HYPERACTIVITY DISORDER (ADHD), PREDOMINANTLY INATTENTIVE TYPE: ICD-10-CM

## 2025-01-29 DIAGNOSIS — F33.1 MODERATE EPISODE OF RECURRENT MAJOR DEPRESSIVE DISORDER (H): Primary | ICD-10-CM

## 2025-01-29 PROCEDURE — 90837 PSYTX W PT 60 MINUTES: CPT | Mod: 95

## 2025-01-29 NOTE — PROGRESS NOTES
[facility]                                     Progress Note    Patient Name: Tracey Crawford  Date: 1/29/2025         Service Type: Individual      Session Start Time: 12:59 PM  Session End Time: 1:55 PM     Session Length: 53+ min    Session #: 4    Attendees: Client attended alone    Service Modality:  Video Visit:      Provider verified identity through the following two step process.  Patient provided:  Patient is known previously to provider    Telemedicine Visit: The patient's condition can be safely assessed and treated via synchronous audio and visual telemedicine encounter.      Reason for Telemedicine Visit: Patient has requested telehealth visit    Originating Site (Patient Location): Patient's home    Distant Site (Provider Location): Provider Remote Setting- Home Office    Consent:  The patient/guardian has verbally consented to: the potential risks and benefits of telemedicine (video visit) versus in person care; bill my insurance or make self-payment for services provided; and responsibility for payment of non-covered services.     Patient would like the video invitation sent by:  My Chart    Mode of Communication:  Video Conference via [employer]    Distant Location (Provider):  Off-site    As the provider I attest to compliance with applicable laws and regulations related to telemedicine.    DATA  Extended Session (53+ minutes): PROLONGED SERVICE IN THE OUTPATIENT SETTING REQUIRING DIRECT (FACE-TO-FACE) PATIENT CONTACT BEYOND THE USUAL SERVICE:    - Patient's presenting concerns require more intensive intervention than could be completed within the usual service  Interactive Complexity: No  Crisis: No        Progress Since Last Session (Related to Symptoms / Goals / Homework):   Symptoms: Improving symptoms of depression based on PHQ-9 score.     Homework: Achieved / completed to satisfaction      Episode of Care Goals: Satisfactory progress - PREPARATION (Decided to change -  considering how); Intervened by negotiating a change plan and determining options / strategies for behavior change, identifying triggers, exploring social supports, and working towards setting a date to begin behavior change     Current / Ongoing Stressors and Concerns:   Patient is a parent and a wife. Patient is currently unemployed. Patient was diagnosed with ADHD in childhood.      Treatment Objective(s) Addressed in This Session:   Patient will learn about the diagnosis and symptoms of ADHD.      Intervention:   Motivational Interviewing: supported patient in processing and exploring coping skills she is already using.   Validated patient's use of skills. Taught about the areas of executive dysfunction with ADHD.  Taught about masking. Discussed practicing identifying when she is masking and unmasking. Discussed obtaining a paper planner for next session. Built rapport with patient.     Assessments completed prior to visit:  The following assessments were completed by patient for this visit:  PHQ9:       5/13/2021     2:00 PM 5/26/2022     3:03 PM 2/27/2023     9:42 AM 10/25/2023     5:51 PM 12/2/2024     1:37 PM 1/24/2025     9:31 AM 1/28/2025     9:53 PM   PHQ-9 SCORE   PHQ-9 Total Score MyChart  3 (Minimal depression) 8 (Mild depression) 7 (Mild depression) 10 (Moderate depression) 5 (Mild depression) 0   PHQ-9 Total Score 4 3 8 7 10  5  0        Patient-reported     GAD7:       8/27/2020    11:00 AM 5/13/2021     2:00 PM 5/26/2022     3:04 PM 2/27/2023     9:42 AM 12/2/2024     2:04 PM 1/24/2025     9:33 AM   LOGAN-7 SCORE   Total Score   9 (mild anxiety) 10 (moderate anxiety) 14 (moderate anxiety) 9 (mild anxiety)   Total Score 3 4 9 10 14  9        Patient-reported     PROMIS 10-Global Health (only subscores and total score):       12/2/2024     2:06 PM 1/24/2025    10:03 AM   PROMIS-10 Scores Only   Global Mental Health Score 7  9   Global Physical Health Score 14  16   PROMIS TOTAL - SUBSCORES 21  25        Patient-reported         ASSESSMENT: Current Emotional / Mental Status (status of significant symptoms):   Risk status (Self / Other harm or suicidal ideation)   Patient denies current fears or concerns for personal safety.   Patient denies current or recent suicidal ideation or behaviors.   Patient denies current or recent homicidal ideation or behaviors.   Patient denies current or recent self injurious behavior or ideation.   Patient denies other safety concerns.   Patient reports there has been no change in risk factors since their last session.     Patient reports there has been no change in protective factors since their last session.     Recommended that patient call 911 or go to the local ED should there be a change in any of these risk factors     Appearance:   Appropriate    Eye Contact:   Good    Psychomotor Behavior: Normal    Attitude:   Cooperative    Orientation:   All   Speech    Rate / Production: Normal     Volume:  Normal    Mood:    Anxious  Depressed    Affect:    Worrisome    Thought Content:  Rumination    Thought Form:  Coherent    Insight:    Good      Medication Review:   No changes to current psychiatric medication(s)     Medication Compliance:   Yes     Changes in Health Issues:   None reported     Chemical Use Review:   Substance Use: Chemical use reviewed, no active concerns identified      Tobacco Use: No current tobacco use.      Diagnosis:  1. Moderate episode of recurrent major depressive disorder (H)    2. Attention deficit hyperactivity disorder (ADHD), predominantly inattentive type    3. Anxiety disorder, unspecified type        Collateral Reports Completed:   Not Applicable    PLAN: (Patient Tasks / Therapist Tasks / Other)  Patient will practice identifying when she is masking and not. Patient will obtain a paper planner.  We will discuss next session.       Virginie Rosado, LICSW 1/29/2025  __________________________________________________________________    Individual  Treatment Plan    Patient's Name: Tracey Crawford  YOB: 1990    Date of Creation: 1/24/2025  Date Treatment Plan Last Reviewed/Revised: 1/24/2025  DSM5 Diagnoses: Attention-Deficit/Hyperactivity Disorder  314.00 (F90.0) Predominantly inattentive presentation, 296.32 (F33.1) Major Depressive Disorder, Recurrent Episode, Moderate _, or 300.09 (F41.8) Other Specified Anxiety Disorder   Psychosocial / Contextual Factors: Patient is a parent and a wife. Patient is currently unemployed.  PROMIS (reviewed every 90 days): The following assessments were completed by patient for this visit:  PROMIS 10-Global Health (only subscores and total score):       12/2/2024     2:06 PM 1/24/2025    10:03 AM   PROMIS-10 Scores Only   Global Mental Health Score 7  9   Global Physical Health Score 14  16   PROMIS TOTAL - SUBSCORES 21  25       Patient-reported       Referral / Collaboration:  Referral to another professional/service is not indicated at this time..    Anticipated number of session for this episode of care:  75  Anticipation frequency of session: Every other week  Anticipated Duration of each session: 53 or more minutes  Treatment plan will be reviewed in 90 days or when goals have been changed.       MeasurableTreatment Goal(s) related to diagnosis / functional impairment(s)  Goal 1:  Patient will work on stabilizing symptoms of ADHD.    I will know I've met my goal when visually my house will be  and being able to keep up on this and when I'm late less days.       Objective #A  Patient will learn about the diagnosis and symptoms of ADHD.   Status: New - Date: 1/24/2025     Intervention(s)  Therapist will teach about the diagnosis of ADHD.     Objective #B  Patient will use daily planner 50% of the time.                         Status: New - Date: 1/24/2025     Intervention(s)  Therapist will teach organizational skills.     Objective #C  Patient will learn 5 skills to help increase executive functioning  skills.   Status: New - Date: 1/24/2025    Intervention(s)  Therapist will assign homework of using coping skills to increase executive functioning skills.    Objective #D  Patient will create and implement daily and weekly routines.   Status: New - Date: 1/24/2025     Intervention(s)  Therapist will teach about how routines help support ADHD and assign homework to create and implement routines.       Goal 2: Client will work on stabilizing anxiety symptoms as evidenced by LOGAN-7 scores (current is 9) and Self report.  Goal is to reduce by five points.      I will know I've met my goal when I'm not over thinking things as much.      Objective #A Client will identify triggers of anxiety and process them in session.    Status: New - Date: 1/24/2025    Intervention(s)  Therapist will teach emotional recognition/identification by assigning homework to journal with written exercises.    Objective #B  Client will identify initial signs or symptoms of anxiety.    Status: New - Date: 1/24/2025    Intervention(s)  Therapist will teach emotional regulation skills, such as deep breathing and mindfulness skills.    Objective #C  Client will learn 5 new coping skills to reduce symptoms anxiety.   Status: New - Date: 1/24/2025    Intervention(s)  Therapist will provide educational materials on distress tolerance skills and other copings skills.    Objective #D  Client will learn about how ADHD and anxiety are co-morbid.   Status: New - Date: 1/24/2025    Intervention(s)  Therapist will provide educational materials on how ADHD and anxiety disorders can impact each other.      Goal 3: Client will work on stabilizing depressive symptoms as evidenced by PHQ scores (current is 5) and Self report.  Goal is to reduce by five points.      I will know I've met my goal when my self-confidence increases.      Objective #A    Client will bring to session stressors since last visit to process and formulate coping mechanisms.  Status: New -  Date: 1/24/2025    Intervention(s)  Therapist will provide educational materials on distress tolerance skills and other copings skills.     Intervention(s)  Therapist will bring to each session thought patterns that contribute to stress and depression, and develop cognitive restructuring techniques to help manage these thought distortions.     Objective #B  Client will incorporate self-care in everyday life to manage physical and mental health.   Status: New - Date: 1/24/2025    Intervention(s)  Therapist will process through with success and failures related to self-care activities.    Objective #C  Client will reduce feeling bad about herself by being more aware of cognitive distortions.  Status: New - Date: 1/24/2025    Intervention(s)  Therapist will teach about cognitive distortions, specifically patterns, and look at ways to be more aware of thoughts.        Patient has reviewed and agreed to the above plan.      SEAN Haines  January 24, 2025

## 2025-02-27 ENCOUNTER — VIRTUAL VISIT (OUTPATIENT)
Dept: PSYCHOLOGY | Facility: CLINIC | Age: 35
End: 2025-02-27
Payer: COMMERCIAL

## 2025-02-27 DIAGNOSIS — F41.9 ANXIETY DISORDER, UNSPECIFIED TYPE: ICD-10-CM

## 2025-02-27 DIAGNOSIS — F33.1 MODERATE EPISODE OF RECURRENT MAJOR DEPRESSIVE DISORDER (H): Primary | ICD-10-CM

## 2025-02-27 DIAGNOSIS — F90.0 ATTENTION DEFICIT HYPERACTIVITY DISORDER (ADHD), PREDOMINANTLY INATTENTIVE TYPE: ICD-10-CM

## 2025-02-27 NOTE — PROGRESS NOTES
[facility]                                     Progress Note    Patient Name: Tracey Crawford  Date: 2/27/2025         Service Type: Individual      Session Start Time: 10:00 AM  Session End Time: 10:56  AM     Session Length: 53+ min    Session #: 6    Attendees: Client attended alone    Service Modality:  Video Visit:      Provider verified identity through the following two step process.  Patient provided:  Patient is known previously to provider    Telemedicine Visit: The patient's condition can be safely assessed and treated via synchronous audio and visual telemedicine encounter.      Reason for Telemedicine Visit: Patient has requested telehealth visit    Originating Site (Patient Location): Patient's home    Distant Site (Provider Location): Provider Remote Setting- Home Office    Consent:  The patient/guardian has verbally consented to: the potential risks and benefits of telemedicine (video visit) versus in person care; bill my insurance or make self-payment for services provided; and responsibility for payment of non-covered services.     Patient would like the video invitation sent by:  My Chart    Mode of Communication:  [facility]    Distant Location (Provider):  Off-site    As the provider I attest to compliance with applicable laws and regulations related to telemedicine.    DATA  Extended Session (53+ minutes): PROLONGED SERVICE IN THE OUTPATIENT SETTING REQUIRING DIRECT (FACE-TO-FACE) PATIENT CONTACT BEYOND THE USUAL SERVICE:    - Patient's presenting concerns require more intensive intervention than could be completed within the usual service  Interactive Complexity: No  Crisis: No        Progress Since Last Session (Related to Symptoms / Goals / Homework):   Symptoms: No change based on patient self-report.     Homework: Achieved / completed to satisfaction      Episode of Care Goals: Satisfactory progress - PREPARATION (Decided to change - considering how);  Intervened by negotiating a change plan and determining options / strategies for behavior change, identifying triggers, exploring social supports, and working towards setting a date to begin behavior change     Current / Ongoing Stressors and Concerns:   Patient is a parent and a wife. Patient is currently unemployed. Patient was diagnosed with ADHD in childhood. Patient reports concerns about executive functioning tasks. Patient reports concerns about emotion regulation.      Treatment Objective(s) Addressed in This Session:   Patient will learn about the diagnosis and symptoms of ADHD.   Patient will learn 5 skills to help increase executive functioning skills.      Intervention:   Motivational Interviewing: supported patient in processing and exploring concerns about time management.   Validated patient's emotions. Supported patient in processing and exploring how timing her activities went. Validated patient's use of skills. Discussed the purpose of timing her activities to bring awareness to the passage of time. Discussed continuing to practice this. Supported patient in exploring how emotion regulation impacts task completion. Discussed how ADHD impacts the intensity of emotions experienced. Taught about temperature changing. Discussed practicing changing her temperature to help regulate emotions. Discussed being mindful of how effective this is.     Assessments completed prior to visit:  The following assessments were completed by patient for this visit:  PHQ9:       5/26/2022     3:03 PM 2/27/2023     9:42 AM 10/25/2023     5:51 PM 12/2/2024     1:37 PM 1/24/2025     9:31 AM 1/28/2025     9:53 PM 2/21/2025     8:00 AM   PHQ-9 SCORE   PHQ-9 Total Score MyChart 3 (Minimal depression) 8 (Mild depression) 7 (Mild depression) 10 (Moderate depression) 5 (Mild depression) 0    PHQ-9 Total Score 3 8 7 10  5  0  8       Patient-reported     GAD7:       8/27/2020    11:00 AM 5/13/2021     2:00 PM 5/26/2022     3:04 PM  2/27/2023     9:42 AM 12/2/2024     2:04 PM 1/24/2025     9:33 AM 2/21/2025     8:00 AM   LOGAN-7 SCORE   Total Score   9 (mild anxiety) 10 (moderate anxiety) 14 (moderate anxiety) 9 (mild anxiety)    Total Score 3 4 9 10 14  9  12       Patient-reported     PROMIS 10-Global Health (only subscores and total score):       12/2/2024     2:06 PM 1/24/2025    10:03 AM   PROMIS-10 Scores Only   Global Mental Health Score 7  9   Global Physical Health Score 14  16   PROMIS TOTAL - SUBSCORES 21  25       Patient-reported         ASSESSMENT: Current Emotional / Mental Status (status of significant symptoms):   Risk status (Self / Other harm or suicidal ideation)   Patient denies current fears or concerns for personal safety.   Patient denies current or recent suicidal ideation or behaviors.   Patient denies current or recent homicidal ideation or behaviors.   Patient denies current or recent self injurious behavior or ideation.   Patient denies other safety concerns.   Patient reports there has been no change in risk factors since their last session.     Patient reports there has been no change in protective factors since their last session.     Recommended that patient call 911 or go to the local ED should there be a change in any of these risk factors     Appearance:   Appropriate    Eye Contact:   Good    Psychomotor Behavior: Normal    Attitude:   Cooperative    Orientation:   All   Speech    Rate / Production: Normal     Volume:  Normal    Mood:    Anxious  Depressed    Affect:    Worrisome    Thought Content:  Rumination    Thought Form:  Coherent    Insight:    Good      Medication Review:   No changes to current psychiatric medication(s)     Medication Compliance:   Yes     Changes in Health Issues:   None reported     Chemical Use Review:   Substance Use: Chemical use reviewed, no active concerns identified      Tobacco Use: No current tobacco use.      Diagnosis:  1. Moderate episode of recurrent major depressive  disorder (H)    2. Attention deficit hyperactivity disorder (ADHD), predominantly inattentive type    3. Anxiety disorder, unspecified type        Collateral Reports Completed:   Not Applicable    PLAN: (Patient Tasks / Therapist Tasks / Other)  Patient will practice timing herself while completing tasks to increase awareness of time. Patient will practice changing her temperature to help regulate emotions. Patient will be mindful of how effective this is.  We will discuss next session.       Virginie Rosado, [facility] 2/27/2025  __________________________________________________________________    Individual Treatment Plan    Patient's Name: Tracey Crawford  YOB: 1990    Date of Creation: 1/24/2025  Date Treatment Plan Last Reviewed/Revised: 1/24/2025  DSM5 Diagnoses: Attention-Deficit/Hyperactivity Disorder  314.00 (F90.0) Predominantly inattentive presentation, 296.32 (F33.1) Major Depressive Disorder, Recurrent Episode, Moderate _, or 300.09 (F41.8) Other Specified Anxiety Disorder   Psychosocial / Contextual Factors: Patient is a parent and a wife. Patient is currently unemployed.  PROMIS (reviewed every 90 days): The following assessments were completed by patient for this visit:  PROMIS 10-Global Health (only subscores and total score):       12/2/2024     2:06 PM 1/24/2025    10:03 AM   PROMIS-10 Scores Only   Global Mental Health Score 7  9   Global Physical Health Score 14  16   PROMIS TOTAL - SUBSCORES 21  25       Patient-reported       Referral / Collaboration:  Referral to another professional/service is not indicated at this time..    Anticipated number of session for this episode of care:  75  Anticipation frequency of session: Every other week  Anticipated Duration of each session: 53 or more minutes  Treatment plan will be reviewed in 90 days or when goals have been changed.       MeasurableTreatment Goal(s) related to diagnosis / functional impairment(s)  Goal 1:  Patient will work on  stabilizing symptoms of ADHD.    I will know I've met my goal when visually my house will be  and being able to keep up on this and when I'm late less days.       Objective #A  Patient will learn about the diagnosis and symptoms of ADHD.   Status: New - Date: 1/24/2025     Intervention(s)  Therapist will teach about the diagnosis of ADHD.     Objective #B  Patient will use daily planner 50% of the time.                         Status: New - Date: 1/24/2025     Intervention(s)  Therapist will teach organizational skills.     Objective #C  Patient will learn 5 skills to help increase executive functioning skills.   Status: New - Date: 1/24/2025    Intervention(s)  Therapist will assign homework of using coping skills to increase executive functioning skills.    Objective #D  Patient will create and implement daily and weekly routines.   Status: New - Date: 1/24/2025     Intervention(s)  Therapist will teach about how routines help support ADHD and assign homework to create and implement routines.       Goal 2: Client will work on stabilizing anxiety symptoms as evidenced by LOGAN-7 scores (current is 9) and Self report.  Goal is to reduce by five points.      I will know I've met my goal when I'm not over thinking things as much.      Objective #A Client will identify triggers of anxiety and process them in session.    Status: New - Date: 1/24/2025    Intervention(s)  Therapist will teach emotional recognition/identification by assigning homework to journal with written exercises.    Objective #B  Client will identify initial signs or symptoms of anxiety.    Status: New - Date: 1/24/2025    Intervention(s)  Therapist will teach emotional regulation skills, such as deep breathing and mindfulness skills.    Objective #C  Client will learn 5 new coping skills to reduce symptoms anxiety.   Status: New - Date: 1/24/2025    Intervention(s)  Therapist will provide educational materials on distress tolerance skills and  other copings skills.    Objective #D  Client will learn about how ADHD and anxiety are co-morbid.   Status: New - Date: 1/24/2025    Intervention(s)  Therapist will provide educational materials on how ADHD and anxiety disorders can impact each other.      Goal 3: Client will work on stabilizing depressive symptoms as evidenced by PHQ scores (current is 5) and Self report.  Goal is to reduce by five points.      I will know I've met my goal when my self-confidence increases.      Objective #A    Client will bring to session stressors since last visit to process and formulate coping mechanisms.  Status: New - Date: 1/24/2025    Intervention(s)  Therapist will provide educational materials on distress tolerance skills and other copings skills.     Intervention(s)  Therapist will bring to each session thought patterns that contribute to stress and depression, and develop cognitive restructuring techniques to help manage these thought distortions.     Objective #B  Client will incorporate self-care in everyday life to manage physical and mental health.   Status: New - Date: 1/24/2025    Intervention(s)  Therapist will process through with success and failures related to self-care activities.    Objective #C  Client will reduce feeling bad about herself by being more aware of cognitive distortions.  Status: New - Date: 1/24/2025    Intervention(s)  Therapist will teach about cognitive distortions, specifically patterns, and look at ways to be more aware of thoughts.        Patient has reviewed and agreed to the above plan.      SEAN Haines  January 24, 2025

## 2025-03-06 ENCOUNTER — VIRTUAL VISIT (OUTPATIENT)
Dept: PSYCHOLOGY | Facility: CLINIC | Age: 35
End: 2025-03-06
Payer: COMMERCIAL

## 2025-03-06 DIAGNOSIS — F90.0 ATTENTION DEFICIT HYPERACTIVITY DISORDER (ADHD), PREDOMINANTLY INATTENTIVE TYPE: ICD-10-CM

## 2025-03-06 DIAGNOSIS — F41.9 ANXIETY DISORDER, UNSPECIFIED TYPE: ICD-10-CM

## 2025-03-06 DIAGNOSIS — F33.1 MODERATE EPISODE OF RECURRENT MAJOR DEPRESSIVE DISORDER (H): Primary | ICD-10-CM

## 2025-03-06 NOTE — PROGRESS NOTES
[facility]                                     Progress Note    Patient Name: Tracey Crawford  Date: 3/6/2025         Service Type: Individual      Session Start Time: 3:01 PM  Session End Time: 3:57 PM     Session Length: 53+ min    Session #: 7    Attendees: Client attended alone    Service Modality:  Video Visit:      Provider verified identity through the following two step process.  Patient provided:  Patient is known previously to provider    Telemedicine Visit: The patient's condition can be safely assessed and treated via synchronous audio and visual telemedicine encounter.      Reason for Telemedicine Visit: Patient has requested telehealth visit    Originating Site (Patient Location): Patient's home    Distant Site (Provider Location): Provider Remote Setting- Home Office    Consent:  The patient/guardian has verbally consented to: the potential risks and benefits of telemedicine (video visit) versus in person care; bill my insurance or make self-payment for services provided; and responsibility for payment of non-covered services.     Patient would like the video invitation sent by:  My Chart    Mode of Communication:  Video Conference via Amwell    Distant Location (Provider):  Off-site    As the provider I attest to compliance with applicable laws and regulations related to telemedicine.    DATA  Extended Session (53+ minutes): PROLONGED SERVICE IN THE OUTPATIENT SETTING REQUIRING DIRECT (FACE-TO-FACE) PATIENT CONTACT BEYOND THE USUAL SERVICE:    - Patient's presenting concerns require more intensive intervention than could be completed within the usual service  Interactive Complexity: No  Crisis: No        Progress Since Last Session (Related to Symptoms / Goals / Homework):   Symptoms: No change based on patient self-report.     Homework: Partially completed      Episode of Care Goals: Satisfactory progress - PREPARATION (Decided to change - considering how); Intervened by  negotiating a change plan and determining options / strategies for behavior change, identifying triggers, exploring social supports, and working towards setting a date to begin behavior change     Current / Ongoing Stressors and Concerns:   Patient is a parent and a wife. Patient is currently unemployed. Patient was diagnosed with ADHD in childhood. Patient reports concerns about executive functioning tasks. Patient reports concerns about emotion regulation. Patient reports concerns about routines.      Treatment Objective(s) Addressed in This Session:   Patient will learn about the diagnosis and symptoms of ADHD.   Patient will learn 5 skills to help increase executive functioning skills.      Intervention:   Motivational Interviewing: supported patient in processing and exploring concerns about time management.   Validated patient's use of skills. Supported patient in processing and exploring the emotion of shame related to being late. Reviewed timing herself to bring awareness to the passage of time. Supported patient in processing and exploring concerns about lack of routine. Discussed how ADHD can impact ability to create and maintain routines. Discussed how to create a routine. Discussed how muscle memory can supplement mental memory with routines. Discussed creating and implementing a night time routine.    Assessments completed prior to visit:  The following assessments were completed by patient for this visit:  PHQ9:       5/26/2022     3:03 PM 2/27/2023     9:42 AM 10/25/2023     5:51 PM 12/2/2024     1:37 PM 1/24/2025     9:31 AM 1/28/2025     9:53 PM 2/21/2025     8:00 AM   PHQ-9 SCORE   PHQ-9 Total Score MyChart 3 (Minimal depression) 8 (Mild depression) 7 (Mild depression) 10 (Moderate depression) 5 (Mild depression) 0    PHQ-9 Total Score 3 8 7 10  5  0  8       Patient-reported     GAD7:       8/27/2020    11:00 AM 5/13/2021     2:00 PM 5/26/2022     3:04 PM 2/27/2023     9:42 AM 12/2/2024     2:04  PM 1/24/2025     9:33 AM 2/21/2025     8:00 AM   LOGAN-7 SCORE   Total Score   9 (mild anxiety) 10 (moderate anxiety) 14 (moderate anxiety) 9 (mild anxiety)    Total Score 3 4 9 10 14  9  12       Patient-reported     PROMIS 10-Global Health (only subscores and total score):       12/2/2024     2:06 PM 1/24/2025    10:03 AM   PROMIS-10 Scores Only   Global Mental Health Score 7  9   Global Physical Health Score 14  16   PROMIS TOTAL - SUBSCORES 21  25       Patient-reported         ASSESSMENT: Current Emotional / Mental Status (status of significant symptoms):   Risk status (Self / Other harm or suicidal ideation)   Patient denies current fears or concerns for personal safety.   Patient denies current or recent suicidal ideation or behaviors.   Patient denies current or recent homicidal ideation or behaviors.   Patient denies current or recent self injurious behavior or ideation.   Patient denies other safety concerns.   Patient reports there has been no change in risk factors since their last session.     Patient reports there has been no change in protective factors since their last session.     Recommended that patient call 911 or go to the local ED should there be a change in any of these risk factors     Appearance:   Appropriate    Eye Contact:   Good    Psychomotor Behavior: Normal    Attitude:   Cooperative    Orientation:   All   Speech    Rate / Production: Normal     Volume:  Normal    Mood:    Anxious  Depressed    Affect:    Worrisome    Thought Content:  Rumination    Thought Form:  Coherent    Insight:    Good      Medication Review:   No changes to current psychiatric medication(s)     Medication Compliance:   Yes     Changes in Health Issues:   None reported     Chemical Use Review:   Substance Use: Chemical use reviewed, no active concerns identified      Tobacco Use: No current tobacco use.      Diagnosis:  1. Moderate episode of recurrent major depressive disorder (H)    2. Attention deficit  hyperactivity disorder (ADHD), predominantly inattentive type    3. Anxiety disorder, unspecified type        Collateral Reports Completed:   Not Applicable    PLAN: (Patient Tasks / Therapist Tasks / Other)  Patient will practice timing herself while completing tasks to increase awareness of time. Patient will create and implement a night time routine. Patient will be mindful of how effective this is. We will discuss next session.       Virginie Rosado, [facility] 3/6/2025  __________________________________________________________________    Individual Treatment Plan    Patient's Name: Tracey Crawford  YOB: 1990    Date of Creation: 1/24/2025  Date Treatment Plan Last Reviewed/Revised: 1/24/2025  DSM5 Diagnoses: Attention-Deficit/Hyperactivity Disorder  314.00 (F90.0) Predominantly inattentive presentation, 296.32 (F33.1) Major Depressive Disorder, Recurrent Episode, Moderate _, or 300.09 (F41.8) Other Specified Anxiety Disorder   Psychosocial / Contextual Factors: Patient is a parent and a wife. Patient is currently unemployed.  PROMIS (reviewed every 90 days): The following assessments were completed by patient for this visit:  PROMIS 10-Global Health (only subscores and total score):       12/2/2024     2:06 PM 1/24/2025    10:03 AM   PROMIS-10 Scores Only   Global Mental Health Score 7  9   Global Physical Health Score 14  16   PROMIS TOTAL - SUBSCORES 21  25       Patient-reported       Referral / Collaboration:  Referral to another professional/service is not indicated at this time..    Anticipated number of session for this episode of care:  75  Anticipation frequency of session: Every other week  Anticipated Duration of each session: 53 or more minutes  Treatment plan will be reviewed in 90 days or when goals have been changed.       MeasurableTreatment Goal(s) related to diagnosis / functional impairment(s)  Goal 1:  Patient will work on stabilizing symptoms of ADHD.    I will know I've met my goal when  visually my house will be  and being able to keep up on this and when I'm late less days.       Objective #A  Patient will learn about the diagnosis and symptoms of ADHD.   Status: New - Date: 1/24/2025     Intervention(s)  Therapist will teach about the diagnosis of ADHD.     Objective #B  Patient will use daily planner 50% of the time.                         Status: New - Date: 1/24/2025     Intervention(s)  Therapist will teach organizational skills.     Objective #C  Patient will learn 5 skills to help increase executive functioning skills.   Status: New - Date: 1/24/2025    Intervention(s)  Therapist will assign homework of using coping skills to increase executive functioning skills.    Objective #D  Patient will create and implement daily and weekly routines.   Status: New - Date: 1/24/2025     Intervention(s)  Therapist will teach about how routines help support ADHD and assign homework to create and implement routines.       Goal 2: Client will work on stabilizing anxiety symptoms as evidenced by LOGAN-7 scores (current is 9) and Self report.  Goal is to reduce by five points.      I will know I've met my goal when I'm not over thinking things as much.      Objective #A Client will identify triggers of anxiety and process them in session.    Status: New - Date: 1/24/2025    Intervention(s)  Therapist will teach emotional recognition/identification by assigning homework to journal with written exercises.    Objective #B  Client will identify initial signs or symptoms of anxiety.    Status: New - Date: 1/24/2025    Intervention(s)  Therapist will teach emotional regulation skills, such as deep breathing and mindfulness skills.    Objective #C  Client will learn 5 new coping skills to reduce symptoms anxiety.   Status: New - Date: 1/24/2025    Intervention(s)  Therapist will provide educational materials on distress tolerance skills and other copings skills.    Objective #D  Client will learn about how  ADHD and anxiety are co-morbid.   Status: New - Date: 1/24/2025    Intervention(s)  Therapist will provide educational materials on how ADHD and anxiety disorders can impact each other.      Goal 3: Client will work on stabilizing depressive symptoms as evidenced by PHQ scores (current is 5) and Self report.  Goal is to reduce by five points.      I will know I've met my goal when my self-confidence increases.      Objective #A    Client will bring to session stressors since last visit to process and formulate coping mechanisms.  Status: New - Date: 1/24/2025    Intervention(s)  Therapist will provide educational materials on distress tolerance skills and other copings skills.     Intervention(s)  Therapist will bring to each session thought patterns that contribute to stress and depression, and develop cognitive restructuring techniques to help manage these thought distortions.     Objective #B  Client will incorporate self-care in everyday life to manage physical and mental health.   Status: New - Date: 1/24/2025    Intervention(s)  Therapist will process through with success and failures related to self-care activities.    Objective #C  Client will reduce feeling bad about herself by being more aware of cognitive distortions.  Status: New - Date: 1/24/2025    Intervention(s)  Therapist will teach about cognitive distortions, specifically patterns, and look at ways to be more aware of thoughts.        Patient has reviewed and agreed to the above plan.      SEAN Haines  January 24, 2025

## 2025-03-20 ENCOUNTER — VIRTUAL VISIT (OUTPATIENT)
Dept: PSYCHOLOGY | Facility: CLINIC | Age: 35
End: 2025-03-20
Payer: COMMERCIAL

## 2025-03-20 DIAGNOSIS — F90.0 ATTENTION DEFICIT HYPERACTIVITY DISORDER (ADHD), PREDOMINANTLY INATTENTIVE TYPE: ICD-10-CM

## 2025-03-20 DIAGNOSIS — F33.1 MODERATE EPISODE OF RECURRENT MAJOR DEPRESSIVE DISORDER (H): Primary | ICD-10-CM

## 2025-03-20 DIAGNOSIS — F41.9 ANXIETY DISORDER, UNSPECIFIED TYPE: ICD-10-CM

## 2025-03-20 ASSESSMENT — ANXIETY QUESTIONNAIRES
6. BECOMING EASILY ANNOYED OR IRRITABLE: SEVERAL DAYS
GAD7 TOTAL SCORE: 13
3. WORRYING TOO MUCH ABOUT DIFFERENT THINGS: NEARLY EVERY DAY
1. FEELING NERVOUS, ANXIOUS, OR ON EDGE: NEARLY EVERY DAY
GAD7 TOTAL SCORE: 13
IF YOU CHECKED OFF ANY PROBLEMS ON THIS QUESTIONNAIRE, HOW DIFFICULT HAVE THESE PROBLEMS MADE IT FOR YOU TO DO YOUR WORK, TAKE CARE OF THINGS AT HOME, OR GET ALONG WITH OTHER PEOPLE: VERY DIFFICULT
5. BEING SO RESTLESS THAT IT IS HARD TO SIT STILL: SEVERAL DAYS
7. FEELING AFRAID AS IF SOMETHING AWFUL MIGHT HAPPEN: SEVERAL DAYS
2. NOT BEING ABLE TO STOP OR CONTROL WORRYING: SEVERAL DAYS
4. TROUBLE RELAXING: NEARLY EVERY DAY

## 2025-03-20 ASSESSMENT — PATIENT HEALTH QUESTIONNAIRE - PHQ9: SUM OF ALL RESPONSES TO PHQ QUESTIONS 1-9: 8

## 2025-03-20 NOTE — PROGRESS NOTES
[facility]                                     Progress Note    Patient Name: Tracey Crawford  Date: 3/20/2025         Service Type: Individual      Session Start Time: 12:00 PM  Session End Time: 12:58 PM     Session Length: 53+ min    Session #: 8    Attendees: Client attended alone    Service Modality:  Video Visit:      Provider verified identity through the following two step process.  Patient provided:  Patient is known previously to provider    Telemedicine Visit: The patient's condition can be safely assessed and treated via synchronous audio and visual telemedicine encounter.      Reason for Telemedicine Visit: Patient has requested telehealth visit    Originating Site (Patient Location): Patient's home    Distant Site (Provider Location): Provider Remote Setting- Home Office    Consent:  The patient/guardian has verbally consented to: the potential risks and benefits of telemedicine (video visit) versus in person care; bill my insurance or make self-payment for services provided; and responsibility for payment of non-covered services.     Patient would like the video invitation sent by:  My Chart    Mode of Communication:  Video Conference via Amwell    Distant Location (Provider):  Off-site    As the provider I attest to compliance with applicable laws and regulations related to telemedicine.    DATA  Extended Session (53+ minutes): PROLONGED SERVICE IN THE OUTPATIENT SETTING REQUIRING DIRECT (FACE-TO-FACE) PATIENT CONTACT BEYOND THE USUAL SERVICE:    - Patient's presenting concerns require more intensive intervention than could be completed within the usual service  Interactive Complexity: No  Crisis: No        Progress Since Last Session (Related to Symptoms / Goals / Homework):   Symptoms: No change based on PHQ-9 and LOGAN-7 scores and patient self-report.    Homework: Partially completed      Episode of Care Goals: Satisfactory progress - PREPARATION (Decided to change - considering  how); Intervened by negotiating a change plan and determining options / strategies for behavior change, identifying triggers, exploring social supports, and working towards setting a date to begin behavior change     Current / Ongoing Stressors and Concerns:   Patient is a parent and a wife. Patient is currently unemployed. Patient was diagnosed with ADHD in childhood. Patient reports concerns about executive functioning tasks. Patient reports concerns about emotion regulation. Patient reports concerns about routines. Patient reports concerns about sensory sensitivities.      Treatment Objective(s) Addressed in This Session:   Patient will learn about the diagnosis and symptoms of ADHD.   Patient will learn 5 skills to help increase executive functioning skills.   Client will identify triggers of anxiety and process them in session.       Intervention:   Motivational Interviewing: supported patient in processing and exploring sensory sensitivity.   Validated patient's emotions. Discussed how ADHD can impact sensory experiencing. Supported patient in exploring how her sensory experiences can lead to symptoms of anxiety. Discussed how anxiety robs us of the present moment. Taught 5-4-3-2-1 ground skill and discussed practicing this to help bring herself into the present moment. Supported patient in exploring how ADHD impacts her social relationships. Discussed how ADHD can impact empathy. Supported patient in exploring her night time routine. Validated patient's use of skills and discussed continuing to implement her routine.     Assessments completed prior to visit:  The following assessments were completed by patient for this visit:  PHQ9:       2/27/2023     9:42 AM 10/25/2023     5:51 PM 12/2/2024     1:37 PM 1/24/2025     9:31 AM 1/28/2025     9:53 PM 2/21/2025     8:00 AM 3/20/2025    12:00 PM   PHQ-9 SCORE   PHQ-9 Total Score [place] 8 (Mild depression) 7 (Mild depression) 10 (Moderate depression) 5 (Mild  depression) 0     PHQ-9 Total Score 8 7 10  5  0  8 8       Patient-reported     GAD7:       5/13/2021     2:00 PM 5/26/2022     3:04 PM 2/27/2023     9:42 AM 12/2/2024     2:04 PM 1/24/2025     9:33 AM 2/21/2025     8:00 AM 3/20/2025    12:00 PM   LOGAN-7 SCORE   Total Score  9 (mild anxiety) 10 (moderate anxiety) 14 (moderate anxiety) 9 (mild anxiety)     Total Score 4 9 10 14  9  12 13       Patient-reported     PROMIS 10-Global Health (only subscores and total score):       12/2/2024     2:06 PM 1/24/2025    10:03 AM   PROMIS-10 Scores Only   Global Mental Health Score 7  9   Global Physical Health Score 14  16   PROMIS TOTAL - SUBSCORES 21  25       Patient-reported         ASSESSMENT: Current Emotional / Mental Status (status of significant symptoms):   Risk status (Self / Other harm or suicidal ideation)   Patient denies current fears or concerns for personal safety.   Patient denies current or recent suicidal ideation or behaviors.   Patient denies current or recent homicidal ideation or behaviors.   Patient denies current or recent self injurious behavior or ideation.   Patient denies other safety concerns.   Patient reports there has been no change in risk factors since their last session.     Patient reports there has been no change in protective factors since their last session.     Recommended that patient call 911 or go to the local ED should there be a change in any of these risk factors     Appearance:   Appropriate    Eye Contact:   Good    Psychomotor Behavior: Normal    Attitude:   Cooperative    Orientation:   All   Speech    Rate / Production: Normal     Volume:  Normal    Mood:    Anxious  Depressed    Affect:    Worrisome    Thought Content:  Rumination    Thought Form:  Coherent    Insight:    Good      Medication Review:   No changes to current psychiatric medication(s)     Medication Compliance:   Yes     Changes in Health Issues:   None reported     Chemical Use Review:   Substance Use:  Chemical use reviewed, no active concerns identified      Tobacco Use: No current tobacco use.      Diagnosis:  1. Moderate episode of recurrent major depressive disorder (H)    2. Attention deficit hyperactivity disorder (ADHD), predominantly inattentive type    3. Anxiety disorder, unspecified type        Collateral Reports Completed:   Not Applicable    PLAN: (Patient Tasks / Therapist Tasks / Other)  Patient will practice 5-4-3-2-1 grounding skill. Patient will continue to implementing her night time routine.  We will discuss next session.       Virginie Rosado, [facility] 3/20/2025  __________________________________________________________________    Individual Treatment Plan    Patient's Name: Tracey Crawford  YOB: 1990    Date of Creation: 1/24/2025  Date Treatment Plan Last Reviewed/Revised: 1/24/2025  DSM5 Diagnoses: Attention-Deficit/Hyperactivity Disorder  314.00 (F90.0) Predominantly inattentive presentation, 296.32 (F33.1) Major Depressive Disorder, Recurrent Episode, Moderate _, or 300.09 (F41.8) Other Specified Anxiety Disorder   Psychosocial / Contextual Factors: Patient is a parent and a wife. Patient is currently unemployed.  PROMIS (reviewed every 90 days): The following assessments were completed by patient for this visit:  PROMIS 10-Global Health (only subscores and total score):       12/2/2024     2:06 PM 1/24/2025    10:03 AM   PROMIS-10 Scores Only   Global Mental Health Score 7  9   Global Physical Health Score 14  16   PROMIS TOTAL - SUBSCORES 21  25       Patient-reported       Referral / Collaboration:  Referral to another professional/service is not indicated at this time..    Anticipated number of session for this episode of care:  75  Anticipation frequency of session: Every other week  Anticipated Duration of each session: 53 or more minutes  Treatment plan will be reviewed in 90 days or when goals have been changed.       MeasurableTreatment Goal(s) related to diagnosis /  functional impairment(s)  Goal 1:  Patient will work on stabilizing symptoms of ADHD.    I will know I've met my goal when visually my house will be  and being able to keep up on this and when I'm late less days.       Objective #A  Patient will learn about the diagnosis and symptoms of ADHD.   Status: New - Date: 1/24/2025     Intervention(s)  Therapist will teach about the diagnosis of ADHD.     Objective #B  Patient will use daily planner 50% of the time.                         Status: New - Date: 1/24/2025     Intervention(s)  Therapist will teach organizational skills.     Objective #C  Patient will learn 5 skills to help increase executive functioning skills.   Status: New - Date: 1/24/2025    Intervention(s)  Therapist will assign homework of using coping skills to increase executive functioning skills.    Objective #D  Patient will create and implement daily and weekly routines.   Status: New - Date: 1/24/2025     Intervention(s)  Therapist will teach about how routines help support ADHD and assign homework to create and implement routines.       Goal 2: Client will work on stabilizing anxiety symptoms as evidenced by LOGAN-7 scores (current is 9) and Self report.  Goal is to reduce by five points.      I will know I've met my goal when I'm not over thinking things as much.      Objective #A Client will identify triggers of anxiety and process them in session.    Status: New - Date: 1/24/2025    Intervention(s)  Therapist will teach emotional recognition/identification by assigning homework to journal with written exercises.    Objective #B  Client will identify initial signs or symptoms of anxiety.    Status: New - Date: 1/24/2025    Intervention(s)  Therapist will teach emotional regulation skills, such as deep breathing and mindfulness skills.    Objective #C  Client will learn 5 new coping skills to reduce symptoms anxiety.   Status: New - Date: 1/24/2025    Intervention(s)  Therapist will provide  educational materials on distress tolerance skills and other copings skills.    Objective #D  Client will learn about how ADHD and anxiety are co-morbid.   Status: New - Date: 1/24/2025    Intervention(s)  Therapist will provide educational materials on how ADHD and anxiety disorders can impact each other.      Goal 3: Client will work on stabilizing depressive symptoms as evidenced by PHQ scores (current is 5) and Self report.  Goal is to reduce by five points.      I will know I've met my goal when my self-confidence increases.      Objective #A    Client will bring to session stressors since last visit to process and formulate coping mechanisms.  Status: New - Date: 1/24/2025    Intervention(s)  Therapist will provide educational materials on distress tolerance skills and other copings skills.     Intervention(s)  Therapist will bring to each session thought patterns that contribute to stress and depression, and develop cognitive restructuring techniques to help manage these thought distortions.     Objective #B  Client will incorporate self-care in everyday life to manage physical and mental health.   Status: New - Date: 1/24/2025    Intervention(s)  Therapist will process through with success and failures related to self-care activities.    Objective #C  Client will reduce feeling bad about herself by being more aware of cognitive distortions.  Status: New - Date: 1/24/2025    Intervention(s)  Therapist will teach about cognitive distortions, specifically patterns, and look at ways to be more aware of thoughts.        Patient has reviewed and agreed to the above plan.      SEAN Haines  January 24, 2025

## 2025-03-26 ENCOUNTER — VIRTUAL VISIT (OUTPATIENT)
Dept: PSYCHOLOGY | Facility: CLINIC | Age: 35
End: 2025-03-26
Payer: COMMERCIAL

## 2025-03-26 DIAGNOSIS — F33.1 MODERATE EPISODE OF RECURRENT MAJOR DEPRESSIVE DISORDER (H): Primary | ICD-10-CM

## 2025-03-26 DIAGNOSIS — F90.0 ATTENTION DEFICIT HYPERACTIVITY DISORDER (ADHD), PREDOMINANTLY INATTENTIVE TYPE: ICD-10-CM

## 2025-03-26 DIAGNOSIS — F41.9 ANXIETY DISORDER, UNSPECIFIED TYPE: ICD-10-CM

## 2025-03-26 PROCEDURE — 90837 PSYTX W PT 60 MINUTES: CPT | Mod: 95

## 2025-03-26 NOTE — PROGRESS NOTES
[facility]                                     Progress Note    Patient Name: Tracey Crawford  Date: 3/26/2025         Service Type: Individual      Session Start Time: 1:00 PM  Session End Time: 1:56 PM     Session Length: 53+ min    Session #: 9    Attendees: Client attended alone    Service Modality:  Video Visit:      Provider verified identity through the following two step process.  Patient provided:  Patient is known previously to provider    Telemedicine Visit: The patient's condition can be safely assessed and treated via synchronous audio and visual telemedicine encounter.      Reason for Telemedicine Visit: Patient has requested telehealth visit    Originating Site (Patient Location): Patient's home    Distant Site (Provider Location): Provider Remote Setting- Home Office    Consent:  The patient/guardian has verbally consented to: the potential risks and benefits of telemedicine (video visit) versus in person care; bill my insurance or make self-payment for services provided; and responsibility for payment of non-covered services.     Patient would like the video invitation sent by:  My Chart    Mode of Communication:  Video Conference via Amwell    Distant Location (Provider):  Off-site    As the provider I attest to compliance with applicable laws and regulations related to telemedicine.    DATA  Extended Session (53+ minutes): PROLONGED SERVICE IN THE OUTPATIENT SETTING REQUIRING DIRECT (FACE-TO-FACE) PATIENT CONTACT BEYOND THE USUAL SERVICE:    - Patient's presenting concerns require more intensive intervention than could be completed within the usual service  Interactive Complexity: No  Crisis: No        Progress Since Last Session (Related to Symptoms / Goals / Homework):   Symptoms: No change based on patient self-report.     Homework: Partially completed      Episode of Care Goals: Satisfactory progress - PREPARATION (Decided to change - considering how); Intervened by  negotiating a change plan and determining options / strategies for behavior change, identifying triggers, exploring social supports, and working towards setting a date to begin behavior change     Current / Ongoing Stressors and Concerns:   Patient is a parent and a wife. Patient is currently unemployed. Patient was diagnosed with ADHD in childhood. Patient reports concerns about executive functioning tasks. Patient reports concerns about emotion regulation. Patient reports concerns about routines. Patient reports concerns about sensory sensitivities. Patient reports concerns about an internal conflict.      Treatment Objective(s) Addressed in This Session:   Patient will learn about the diagnosis and symptoms of ADHD.   Patient will learn 5 skills to help increase executive functioning skills.   Client will identify triggers of anxiety and process them in session.       Intervention:   Motivational Interviewing: supported patient in processing and exploring an internal conflict.   Validated patient's emotions. Supported patient in processing and exploring symptoms of anxiety related to the conflict. Discussed how ADHD impacts processing information. Discussed how decision making is impacted by ADHD. Discussed processing by writing. Taught pros and cons skill. Coached patient in writing out the pros and cons for the conflict. Discussed continuing to add to this list and practicing writing out her thoughts to help herself process. Discussed being mindful of how effective this is.      Assessments completed prior to visit:  The following assessments were completed by patient for this visit:  PHQ9:       2/27/2023     9:42 AM 10/25/2023     5:51 PM 12/2/2024     1:37 PM 1/24/2025     9:31 AM 1/28/2025     9:53 PM 2/21/2025     8:00 AM 3/20/2025    12:00 PM   PHQ-9 SCORE   PHQ-9 Total Score MyChart 8 (Mild depression) 7 (Mild depression) 10 (Moderate depression) 5 (Mild depression) 0     PHQ-9 Total Score 8 7 10  5  0   8 8       Patient-reported     GAD7:       5/13/2021     2:00 PM 5/26/2022     3:04 PM 2/27/2023     9:42 AM 12/2/2024     2:04 PM 1/24/2025     9:33 AM 2/21/2025     8:00 AM 3/20/2025    12:00 PM   LOGAN-7 SCORE   Total Score  9 (mild anxiety) 10 (moderate anxiety) 14 (moderate anxiety) 9 (mild anxiety)     Total Score 4 9 10 14  9  12 13       Patient-reported     PROMIS 10-Global Health (only subscores and total score):       12/2/2024     2:06 PM 1/24/2025    10:03 AM   PROMIS-10 Scores Only   Global Mental Health Score 7  9   Global Physical Health Score 14  16   PROMIS TOTAL - SUBSCORES 21  25       Patient-reported         ASSESSMENT: Current Emotional / Mental Status (status of significant symptoms):   Risk status (Self / Other harm or suicidal ideation)   Patient denies current fears or concerns for personal safety.   Patient denies current or recent suicidal ideation or behaviors.   Patient denies current or recent homicidal ideation or behaviors.   Patient denies current or recent self injurious behavior or ideation.   Patient denies other safety concerns.   Patient reports there has been no change in risk factors since their last session.     Patient reports there has been no change in protective factors since their last session.     Recommended that patient call 911 or go to the local ED should there be a change in any of these risk factors     Appearance:   Appropriate    Eye Contact:   Good    Psychomotor Behavior: Normal    Attitude:   Cooperative    Orientation:   All   Speech    Rate / Production: Normal     Volume:  Normal    Mood:    Anxious  Depressed    Affect:    Worrisome    Thought Content:  Rumination    Thought Form:  Coherent    Insight:    Good      Medication Review:   No changes to current psychiatric medication(s)     Medication Compliance:   Yes     Changes in Health Issues:   None reported     Chemical Use Review:   Substance Use: Chemical use reviewed, no active concerns identified       Tobacco Use: No current tobacco use.      Diagnosis:  1. Moderate episode of recurrent major depressive disorder (H)    2. Attention deficit hyperactivity disorder (ADHD), predominantly inattentive type    3. Anxiety disorder, unspecified type        Collateral Reports Completed:   Not Applicable    PLAN: (Patient Tasks / Therapist Tasks / Other)  Patient will continue to add to her pros and cons list. Patient will practice writing out her thoughts when struggling with decision making.  We will discuss next session.       Virginie Rosado, [facility] 3/26/2025  __________________________________________________________________    Individual Treatment Plan    Patient's Name: Tracey Crawford  YOB: 1990    Date of Creation: 1/24/2025  Date Treatment Plan Last Reviewed/Revised: 1/24/2025  DSM5 Diagnoses: Attention-Deficit/Hyperactivity Disorder  314.00 (F90.0) Predominantly inattentive presentation, 296.32 (F33.1) Major Depressive Disorder, Recurrent Episode, Moderate _, or 300.09 (F41.8) Other Specified Anxiety Disorder   Psychosocial / Contextual Factors: Patient is a parent and a wife. Patient is currently unemployed.  PROMIS (reviewed every 90 days): The following assessments were completed by patient for this visit:  PROMIS 10-Global Health (only subscores and total score):       12/2/2024     2:06 PM 1/24/2025    10:03 AM   PROMIS-10 Scores Only   Global Mental Health Score 7  9   Global Physical Health Score 14  16   PROMIS TOTAL - SUBSCORES 21  25       Patient-reported       Referral / Collaboration:  Referral to another professional/service is not indicated at this time..    Anticipated number of session for this episode of care:  75  Anticipation frequency of session: Every other week  Anticipated Duration of each session: 53 or more minutes  Treatment plan will be reviewed in 90 days or when goals have been changed.       MeasurableTreatment Goal(s) related to diagnosis / functional impairment(s)  Goal  1:  Patient will work on stabilizing symptoms of ADHD.    I will know I've met my goal when visually my house will be  and being able to keep up on this and when I'm late less days.       Objective #A  Patient will learn about the diagnosis and symptoms of ADHD.   Status: New - Date: 1/24/2025     Intervention(s)  Therapist will teach about the diagnosis of ADHD.     Objective #B  Patient will use daily planner 50% of the time.                         Status: New - Date: 1/24/2025     Intervention(s)  Therapist will teach organizational skills.     Objective #C  Patient will learn 5 skills to help increase executive functioning skills.   Status: New - Date: 1/24/2025    Intervention(s)  Therapist will assign homework of using coping skills to increase executive functioning skills.    Objective #D  Patient will create and implement daily and weekly routines.   Status: New - Date: 1/24/2025     Intervention(s)  Therapist will teach about how routines help support ADHD and assign homework to create and implement routines.       Goal 2: Client will work on stabilizing anxiety symptoms as evidenced by LOGAN-7 scores (current is 9) and Self report.  Goal is to reduce by five points.      I will know I've met my goal when I'm not over thinking things as much.      Objective #A Client will identify triggers of anxiety and process them in session.    Status: New - Date: 1/24/2025    Intervention(s)  Therapist will teach emotional recognition/identification by assigning homework to journal with written exercises.    Objective #B  Client will identify initial signs or symptoms of anxiety.    Status: New - Date: 1/24/2025    Intervention(s)  Therapist will teach emotional regulation skills, such as deep breathing and mindfulness skills.    Objective #C  Client will learn 5 new coping skills to reduce symptoms anxiety.   Status: New - Date: 1/24/2025    Intervention(s)  Therapist will provide educational materials on  distress tolerance skills and other copings skills.    Objective #D  Client will learn about how ADHD and anxiety are co-morbid.   Status: New - Date: 1/24/2025    Intervention(s)  Therapist will provide educational materials on how ADHD and anxiety disorders can impact each other.      Goal 3: Client will work on stabilizing depressive symptoms as evidenced by PHQ scores (current is 5) and Self report.  Goal is to reduce by five points.      I will know I've met my goal when my self-confidence increases.      Objective #A    Client will bring to session stressors since last visit to process and formulate coping mechanisms.  Status: New - Date: 1/24/2025    Intervention(s)  Therapist will provide educational materials on distress tolerance skills and other copings skills.     Intervention(s)  Therapist will bring to each session thought patterns that contribute to stress and depression, and develop cognitive restructuring techniques to help manage these thought distortions.     Objective #B  Client will incorporate self-care in everyday life to manage physical and mental health.   Status: New - Date: 1/24/2025    Intervention(s)  Therapist will process through with success and failures related to self-care activities.    Objective #C  Client will reduce feeling bad about herself by being more aware of cognitive distortions.  Status: New - Date: 1/24/2025    Intervention(s)  Therapist will teach about cognitive distortions, specifically patterns, and look at ways to be more aware of thoughts.        Patient has reviewed and agreed to the above plan.      SEAN Haines  January 24, 2025

## 2025-03-29 ENCOUNTER — MYC REFILL (OUTPATIENT)
Dept: FAMILY MEDICINE | Facility: CLINIC | Age: 35
End: 2025-03-29
Payer: COMMERCIAL

## 2025-03-29 DIAGNOSIS — F98.8 ATTENTION DEFICIT DISORDER (ADD) WITHOUT HYPERACTIVITY: ICD-10-CM

## 2025-03-31 RX ORDER — DEXTROAMPHETAMINE SACCHARATE, AMPHETAMINE ASPARTATE, DEXTROAMPHETAMINE SULFATE AND AMPHETAMINE SULFATE 2.5; 2.5; 2.5; 2.5 MG/1; MG/1; MG/1; MG/1
10 TABLET ORAL 2 TIMES DAILY
Qty: 180 TABLET | Refills: 0 | Status: SHIPPED | OUTPATIENT
Start: 2025-03-31

## 2025-04-03 ENCOUNTER — VIRTUAL VISIT (OUTPATIENT)
Dept: PSYCHOLOGY | Facility: CLINIC | Age: 35
End: 2025-04-03
Payer: COMMERCIAL

## 2025-04-03 DIAGNOSIS — F33.1 MODERATE EPISODE OF RECURRENT MAJOR DEPRESSIVE DISORDER (H): Primary | ICD-10-CM

## 2025-04-03 DIAGNOSIS — F90.0 ATTENTION DEFICIT HYPERACTIVITY DISORDER (ADHD), PREDOMINANTLY INATTENTIVE TYPE: ICD-10-CM

## 2025-04-03 DIAGNOSIS — F41.9 ANXIETY DISORDER, UNSPECIFIED TYPE: ICD-10-CM

## 2025-04-03 NOTE — PROGRESS NOTES
[facility]                                     Progress Note    Patient Name: Tracey Crawford  Date: 4/3/2025         Service Type: Individual      Session Start Time: 12:00 PM  Session End Time: 12:53 PM     Session Length: 53+ min    Session #: 10    Attendees: Client attended alone    Service Modality:  Video Visit:      Provider verified identity through the following two step process.  Patient provided:  Patient is known previously to provider    Telemedicine Visit: The patient's condition can be safely assessed and treated via synchronous audio and visual telemedicine encounter.      Reason for Telemedicine Visit: Patient has requested telehealth visit    Originating Site (Patient Location): Patient's home    Distant Site (Provider Location): Provider Remote Setting- Home Office    Consent:  The patient/guardian has verbally consented to: the potential risks and benefits of telemedicine (video visit) versus in person care; bill my insurance or make self-payment for services provided; and responsibility for payment of non-covered services.     Patient would like the video invitation sent by:  My Chart    Mode of Communication:  Video Conference via Amwell    Distant Location (Provider):  Off-site    As the provider I attest to compliance with applicable laws and regulations related to telemedicine.    DATA  Extended Session (53+ minutes): PROLONGED SERVICE IN THE OUTPATIENT SETTING REQUIRING DIRECT (FACE-TO-FACE) PATIENT CONTACT BEYOND THE USUAL SERVICE:    - Patient's presenting concerns require more intensive intervention than could be completed within the usual service  Interactive Complexity: No  Crisis: No        Progress Since Last Session (Related to Symptoms / Goals / Homework):   Symptoms: No change based on patient self-report.     Homework: Partially completed      Episode of Care Goals: Satisfactory progress - PREPARATION (Decided to change - considering how); Intervened by  negotiating a change plan and determining options / strategies for behavior change, identifying triggers, exploring social supports, and working towards setting a date to begin behavior change     Current / Ongoing Stressors and Concerns:   Patient is a parent and a wife. Patient is currently unemployed. Patient was diagnosed with ADHD in childhood. Patient reports concerns about executive functioning tasks. Patient reports concerns about emotion regulation. Patient reports concerns about routines. Patient reports concerns about sensory sensitivities. Patient reports concerns about an internal conflict.      Treatment Objective(s) Addressed in This Session:   Patient will learn about the diagnosis and symptoms of ADHD.   Patient will learn 5 skills to help increase executive functioning skills.   Client will identify triggers of anxiety and process them in session.       Intervention:   Motivational Interviewing: supported patient in processing and exploring an internal conflict.   Validated patient's emotions. Supported patient in processing and exploring what she would like to say to her niece. Discussed effective communication skills. Role played a conversation between patient and her niece where patient uses skills to be effective. Discussed being mindful of how effective this is.     Assessments completed prior to visit:  The following assessments were completed by patient for this visit:  PHQ9:       2/27/2023     9:42 AM 10/25/2023     5:51 PM 12/2/2024     1:37 PM 1/24/2025     9:31 AM 1/28/2025     9:53 PM 2/21/2025     8:00 AM 3/20/2025    12:00 PM   PHQ-9 SCORE   PHQ-9 Total Score MyChart 8 (Mild depression) 7 (Mild depression) 10 (Moderate depression) 5 (Mild depression) 0     PHQ-9 Total Score 8 7 10  5  0  8 8       Patient-reported     GAD7:       5/13/2021     2:00 PM 5/26/2022     3:04 PM 2/27/2023     9:42 AM 12/2/2024     2:04 PM 1/24/2025     9:33 AM 2/21/2025     8:00 AM 3/20/2025    12:00 PM    LOGAN-7 SCORE   Total Score  9 (mild anxiety) 10 (moderate anxiety) 14 (moderate anxiety) 9 (mild anxiety)     Total Score 4 9 10 14  9  12 13       Patient-reported     PROMIS 10-Global Health (only subscores and total score):       12/2/2024     2:06 PM 1/24/2025    10:03 AM   PROMIS-10 Scores Only   Global Mental Health Score 7  9   Global Physical Health Score 14  16   PROMIS TOTAL - SUBSCORES 21  25       Patient-reported         ASSESSMENT: Current Emotional / Mental Status (status of significant symptoms):   Risk status (Self / Other harm or suicidal ideation)   Patient denies current fears or concerns for personal safety.   Patient denies current or recent suicidal ideation or behaviors.   Patient denies current or recent homicidal ideation or behaviors.   Patient denies current or recent self injurious behavior or ideation.   Patient denies other safety concerns.   Patient reports there has been no change in risk factors since their last session.     Patient reports there has been no change in protective factors since their last session.     Recommended that patient call 911 or go to the local ED should there be a change in any of these risk factors     Appearance:   Appropriate    Eye Contact:   Good    Psychomotor Behavior: Normal    Attitude:   Cooperative    Orientation:   All   Speech    Rate / Production: Normal     Volume:  Normal    Mood:    Anxious  Depressed    Affect:    Worrisome    Thought Content:  Rumination    Thought Form:  Coherent    Insight:    Good      Medication Review:   No changes to current psychiatric medication(s)     Medication Compliance:   Yes     Changes in Health Issues:   None reported     Chemical Use Review:   Substance Use: Chemical use reviewed, no active concerns identified      Tobacco Use: No current tobacco use.      Diagnosis:  1. Moderate episode of recurrent major depressive disorder (H)    2. Attention deficit hyperactivity disorder (ADHD), predominantly  inattentive type    3. Anxiety disorder, unspecified type        Collateral Reports Completed:   Not Applicable    PLAN: (Patient Tasks / Therapist Tasks / Other)  Patient will use effective communication skills to talk to her niece. Patent will be mindful of how effective this is.  We will discuss next session.       Virginie Rosado, [facility] 4/3/2025  __________________________________________________________________    Individual Treatment Plan    Patient's Name: Tracey Crawford  YOB: 1990    Date of Creation: 1/24/2025  Date Treatment Plan Last Reviewed/Revised: 1/24/2025  DSM5 Diagnoses: Attention-Deficit/Hyperactivity Disorder  314.00 (F90.0) Predominantly inattentive presentation, 296.32 (F33.1) Major Depressive Disorder, Recurrent Episode, Moderate _, or 300.09 (F41.8) Other Specified Anxiety Disorder   Psychosocial / Contextual Factors: Patient is a parent and a wife. Patient is currently unemployed.  PROMIS (reviewed every 90 days): The following assessments were completed by patient for this visit:  PROMIS 10-Global Health (only subscores and total score):       12/2/2024     2:06 PM 1/24/2025    10:03 AM   PROMIS-10 Scores Only   Global Mental Health Score 7  9   Global Physical Health Score 14  16   PROMIS TOTAL - SUBSCORES 21  25       Patient-reported       Referral / Collaboration:  Referral to another professional/service is not indicated at this time..    Anticipated number of session for this episode of care:  75  Anticipation frequency of session: Every other week  Anticipated Duration of each session: 53 or more minutes  Treatment plan will be reviewed in 90 days or when goals have been changed.       MeasurableTreatment Goal(s) related to diagnosis / functional impairment(s)  Goal 1:  Patient will work on stabilizing symptoms of ADHD.    I will know I've met my goal when visually my house will be  and being able to keep up on this and when I'm late less days.       Objective #A   Patient will learn about the diagnosis and symptoms of ADHD.   Status: New - Date: 1/24/2025     Intervention(s)  Therapist will teach about the diagnosis of ADHD.     Objective #B  Patient will use daily planner 50% of the time.                         Status: New - Date: 1/24/2025     Intervention(s)  Therapist will teach organizational skills.     Objective #C  Patient will learn 5 skills to help increase executive functioning skills.   Status: New - Date: 1/24/2025    Intervention(s)  Therapist will assign homework of using coping skills to increase executive functioning skills.    Objective #D  Patient will create and implement daily and weekly routines.   Status: New - Date: 1/24/2025     Intervention(s)  Therapist will teach about how routines help support ADHD and assign homework to create and implement routines.       Goal 2: Client will work on stabilizing anxiety symptoms as evidenced by LOGAN-7 scores (current is 9) and Self report.  Goal is to reduce by five points.      I will know I've met my goal when I'm not over thinking things as much.      Objective #A Client will identify triggers of anxiety and process them in session.    Status: New - Date: 1/24/2025    Intervention(s)  Therapist will teach emotional recognition/identification by assigning homework to journal with written exercises.    Objective #B  Client will identify initial signs or symptoms of anxiety.    Status: New - Date: 1/24/2025    Intervention(s)  Therapist will teach emotional regulation skills, such as deep breathing and mindfulness skills.    Objective #C  Client will learn 5 new coping skills to reduce symptoms anxiety.   Status: New - Date: 1/24/2025    Intervention(s)  Therapist will provide educational materials on distress tolerance skills and other copings skills.    Objective #D  Client will learn about how ADHD and anxiety are co-morbid.   Status: New - Date: 1/24/2025    Intervention(s)  Therapist will provide educational  materials on how ADHD and anxiety disorders can impact each other.      Goal 3: Client will work on stabilizing depressive symptoms as evidenced by PHQ scores (current is 5) and Self report.  Goal is to reduce by five points.      I will know I've met my goal when my self-confidence increases.      Objective #A    Client will bring to session stressors since last visit to process and formulate coping mechanisms.  Status: New - Date: 1/24/2025    Intervention(s)  Therapist will provide educational materials on distress tolerance skills and other copings skills.     Intervention(s)  Therapist will bring to each session thought patterns that contribute to stress and depression, and develop cognitive restructuring techniques to help manage these thought distortions.     Objective #B  Client will incorporate self-care in everyday life to manage physical and mental health.   Status: New - Date: 1/24/2025    Intervention(s)  Therapist will process through with success and failures related to self-care activities.    Objective #C  Client will reduce feeling bad about herself by being more aware of cognitive distortions.  Status: New - Date: 1/24/2025    Intervention(s)  Therapist will teach about cognitive distortions, specifically patterns, and look at ways to be more aware of thoughts.        Patient has reviewed and agreed to the above plan.      Virginie Rosado, [facility] Center[facility]OBBIE  January 24, 2025

## 2025-04-08 ENCOUNTER — VIRTUAL VISIT (OUTPATIENT)
Dept: PSYCHOLOGY | Facility: CLINIC | Age: 35
End: 2025-04-08
Payer: COMMERCIAL

## 2025-04-08 DIAGNOSIS — F33.1 MODERATE EPISODE OF RECURRENT MAJOR DEPRESSIVE DISORDER (H): Primary | ICD-10-CM

## 2025-04-08 DIAGNOSIS — F90.0 ATTENTION DEFICIT HYPERACTIVITY DISORDER (ADHD), PREDOMINANTLY INATTENTIVE TYPE: ICD-10-CM

## 2025-04-08 DIAGNOSIS — F41.9 ANXIETY DISORDER, UNSPECIFIED TYPE: ICD-10-CM

## 2025-04-08 PROCEDURE — 90837 PSYTX W PT 60 MINUTES: CPT | Mod: 95

## 2025-04-08 NOTE — PROGRESS NOTES
[facility]                                     Progress Note    Patient Name: Tracey Crawford  Date: 4/8/2025         Service Type: Individual      Session Start Time: 2:00 PM  Session End Time: 2:54 PM     Session Length: 53+ min    Session #: 11    Attendees: Client attended alone    Service Modality:  Video Visit:      Provider verified identity through the following two step process.  Patient provided:  Patient is known previously to provider    Telemedicine Visit: The patient's condition can be safely assessed and treated via synchronous audio and visual telemedicine encounter.      Reason for Telemedicine Visit: Patient has requested telehealth visit    Originating Site (Patient Location): Patient's home    Distant Site (Provider Location): Provider Remote Setting- Home Office    Consent:  The patient/guardian has verbally consented to: the potential risks and benefits of telemedicine (video visit) versus in person care; bill my insurance or make self-payment for services provided; and responsibility for payment of non-covered services.     Patient would like the video invitation sent by:  My Chart    Mode of Communication:  Video Conference via Amwell    Distant Location (Provider):  Off-site    As the provider I attest to compliance with applicable laws and regulations related to telemedicine.    DATA  Extended Session (53+ minutes): PROLONGED SERVICE IN THE OUTPATIENT SETTING REQUIRING DIRECT (FACE-TO-FACE) PATIENT CONTACT BEYOND THE USUAL SERVICE:    - Patient's presenting concerns require more intensive intervention than could be completed within the usual service  Interactive Complexity: No  Crisis: No        Progress Since Last Session (Related to Symptoms / Goals / Homework):   Symptoms: Worsening symptoms of depression based on patient self-report.     Homework: Partially completed      Episode of Care Goals: Satisfactory progress - PREPARATION (Decided to change - considering  how); Intervened by negotiating a change plan and determining options / strategies for behavior change, identifying triggers, exploring social supports, and working towards setting a date to begin behavior change     Current / Ongoing Stressors and Concerns:   Patient is a parent and a wife. Patient is currently unemployed. Patient was diagnosed with ADHD in childhood. Patient reports concerns about executive functioning tasks. Patient reports concerns about emotion regulation. Patient reports concerns about routines. Patient reports concerns about sensory sensitivities. Patient reports concerns about an internal conflict.      Treatment Objective(s) Addressed in This Session:   Patient will learn about the diagnosis and symptoms of ADHD.   Patient will learn 5 skills to help increase executive functioning skills.   Client will identify triggers of anxiety and process them in session.       Intervention:   Motivational Interviewing: supported patient in processing and exploring an internal conflict.   Validated patient's emotions. Supported patient in exploring feeling comparisons with others. Supported patient in exploring self-judgments. Discussed positive affirmations. Discussed practicing giving herself positive affirmations and being mindful of how this feels. Supported patient in processing and exploring an increase of symptoms of depression. Supported patient in processing and exploring what has helped reduce these symptoms in the past.     Assessments completed prior to visit:  The following assessments were completed by patient for this visit:  PHQ9:       2/27/2023     9:42 AM 10/25/2023     5:51 PM 12/2/2024     1:37 PM 1/24/2025     9:31 AM 1/28/2025     9:53 PM 2/21/2025     8:00 AM 3/20/2025    12:00 PM   PHQ-9 SCORE   PHQ-9 Total Score MyChart 8 (Mild depression) 7 (Mild depression) 10 (Moderate depression) 5 (Mild depression) 0     PHQ-9 Total Score 8 7 10  5  0  8 8       Patient-reported     GAD7:        5/13/2021     2:00 PM 5/26/2022     3:04 PM 2/27/2023     9:42 AM 12/2/2024     2:04 PM 1/24/2025     9:33 AM 2/21/2025     8:00 AM 3/20/2025    12:00 PM   LOGAN-7 SCORE   Total Score  9 (mild anxiety) 10 (moderate anxiety) 14 (moderate anxiety) 9 (mild anxiety)     Total Score 4 9 10 14  9  12 13       Patient-reported     PROMIS 10-Global Health (only subscores and total score):       12/2/2024     2:06 PM 1/24/2025    10:03 AM   PROMIS-10 Scores Only   Global Mental Health Score 7  9   Global Physical Health Score 14  16   PROMIS TOTAL - SUBSCORES 21  25       Patient-reported         ASSESSMENT: Current Emotional / Mental Status (status of significant symptoms):   Risk status (Self / Other harm or suicidal ideation)   Patient denies current fears or concerns for personal safety.   Patient denies current or recent suicidal ideation or behaviors.   Patient denies current or recent homicidal ideation or behaviors.   Patient denies current or recent self injurious behavior or ideation.   Patient denies other safety concerns.   Patient reports there has been no change in risk factors since their last session.     Patient reports there has been no change in protective factors since their last session.     Recommended that patient call 911 or go to the local ED should there be a change in any of these risk factors     Appearance:   Appropriate    Eye Contact:   Good    Psychomotor Behavior: Normal    Attitude:   Cooperative    Orientation:   All   Speech    Rate / Production: Normal     Volume:  Normal    Mood:    Anxious  Depressed    Affect:    Worrisome    Thought Content:  Rumination    Thought Form:  Coherent    Insight:    Good      Medication Review:   No changes to current psychiatric medication(s)     Medication Compliance:   Yes     Changes in Health Issues:   None reported     Chemical Use Review:   Substance Use: Chemical use reviewed, no active concerns identified      Tobacco Use: No current tobacco  use.      Diagnosis:  1. Moderate episode of recurrent major depressive disorder (H)    2. Attention deficit hyperactivity disorder (ADHD), predominantly inattentive type    3. Anxiety disorder, unspecified type        Collateral Reports Completed:   Not Applicable    PLAN: (Patient Tasks / Therapist Tasks / Other)  Patient will practice giving herself positive affirmations. Patient will be mindful of how it feels to do so.  We will discuss next session.       Virginie Rosado, [facility] 4/8/2025  __________________________________________________________________    Individual Treatment Plan    Patient's Name: Tracey Crawford  YOB: 1990    Date of Creation: 1/24/2025  Date Treatment Plan Last Reviewed/Revised: 1/24/2025  DSM5 Diagnoses: Attention-Deficit/Hyperactivity Disorder  314.00 (F90.0) Predominantly inattentive presentation, 296.32 (F33.1) Major Depressive Disorder, Recurrent Episode, Moderate _, or 300.09 (F41.8) Other Specified Anxiety Disorder   Psychosocial / Contextual Factors: Patient is a parent and a wife. Patient is currently unemployed.  PROMIS (reviewed every 90 days): The following assessments were completed by patient for this visit:  PROMIS 10-Global Health (only subscores and total score):       12/2/2024     2:06 PM 1/24/2025    10:03 AM   PROMIS-10 Scores Only   Global Mental Health Score 7  9   Global Physical Health Score 14  16   PROMIS TOTAL - SUBSCORES 21  25       Patient-reported       Referral / Collaboration:  Referral to another professional/service is not indicated at this time..    Anticipated number of session for this episode of care:  75  Anticipation frequency of session: Every other week  Anticipated Duration of each session: 53 or more minutes  Treatment plan will be reviewed in 90 days or when goals have been changed.       MeasurableTreatment Goal(s) related to diagnosis / functional impairment(s)  Goal 1:  Patient will work on stabilizing symptoms of ADHD.    I will  know I've met my goal when visually my house will be  and being able to keep up on this and when I'm late less days.       Objective #A  Patient will learn about the diagnosis and symptoms of ADHD.   Status: New - Date: 1/24/2025     Intervention(s)  Therapist will teach about the diagnosis of ADHD.     Objective #B  Patient will use daily planner 50% of the time.                         Status: New - Date: 1/24/2025     Intervention(s)  Therapist will teach organizational skills.     Objective #C  Patient will learn 5 skills to help increase executive functioning skills.   Status: New - Date: 1/24/2025    Intervention(s)  Therapist will assign homework of using coping skills to increase executive functioning skills.    Objective #D  Patient will create and implement daily and weekly routines.   Status: New - Date: 1/24/2025     Intervention(s)  Therapist will teach about how routines help support ADHD and assign homework to create and implement routines.       Goal 2: Client will work on stabilizing anxiety symptoms as evidenced by LOGAN-7 scores (current is 9) and Self report.  Goal is to reduce by five points.      I will know I've met my goal when I'm not over thinking things as much.      Objective #A Client will identify triggers of anxiety and process them in session.    Status: New - Date: 1/24/2025    Intervention(s)  Therapist will teach emotional recognition/identification by assigning homework to journal with written exercises.    Objective #B  Client will identify initial signs or symptoms of anxiety.    Status: New - Date: 1/24/2025    Intervention(s)  Therapist will teach emotional regulation skills, such as deep breathing and mindfulness skills.    Objective #C  Client will learn 5 new coping skills to reduce symptoms anxiety.   Status: New - Date: 1/24/2025    Intervention(s)  Therapist will provide educational materials on distress tolerance skills and other copings skills.    Objective  #D  Client will learn about how ADHD and anxiety are co-morbid.   Status: New - Date: 1/24/2025    Intervention(s)  Therapist will provide educational materials on how ADHD and anxiety disorders can impact each other.      Goal 3: Client will work on stabilizing depressive symptoms as evidenced by PHQ scores (current is 5) and Self report.  Goal is to reduce by five points.      I will know I've met my goal when my self-confidence increases.      Objective #A    Client will bring to session stressors since last visit to process and formulate coping mechanisms.  Status: New - Date: 1/24/2025    Intervention(s)  Therapist will provide educational materials on distress tolerance skills and other copings skills.     Intervention(s)  Therapist will bring to each session thought patterns that contribute to stress and depression, and develop cognitive restructuring techniques to help manage these thought distortions.     Objective #B  Client will incorporate self-care in everyday life to manage physical and mental health.   Status: New - Date: 1/24/2025    Intervention(s)  Therapist will process through with success and failures related to self-care activities.    Objective #C  Client will reduce feeling bad about herself by being more aware of cognitive distortions.  Status: New - Date: 1/24/2025    Intervention(s)  Therapist will teach about cognitive distortions, specifically patterns, and look at ways to be more aware of thoughts.        Patient has reviewed and agreed to the above plan.      SEAN Haines  January 24, 2025

## 2025-04-14 ENCOUNTER — VIRTUAL VISIT (OUTPATIENT)
Dept: PSYCHOLOGY | Facility: CLINIC | Age: 35
End: 2025-04-14
Payer: COMMERCIAL

## 2025-04-14 DIAGNOSIS — F90.0 ATTENTION DEFICIT HYPERACTIVITY DISORDER (ADHD), PREDOMINANTLY INATTENTIVE TYPE: ICD-10-CM

## 2025-04-14 DIAGNOSIS — F33.1 MODERATE EPISODE OF RECURRENT MAJOR DEPRESSIVE DISORDER (H): Primary | ICD-10-CM

## 2025-04-14 DIAGNOSIS — F41.9 ANXIETY DISORDER, UNSPECIFIED TYPE: ICD-10-CM

## 2025-04-14 PROCEDURE — 90837 PSYTX W PT 60 MINUTES: CPT | Mod: 95

## 2025-04-14 NOTE — PROGRESS NOTES
[facility]                                     Progress Note    Patient Name: Tracey Crawford  Date: 4/14/2025         Service Type: Individual      Session Start Time: 12:00 PM  Session End Time: 12:54 PM     Session Length: 53+ min    Session #: 12    Attendees: Client attended alone    Service Modality:  Video Visit:      Provider verified identity through the following two step process.  Patient provided:  Patient is known previously to provider    Telemedicine Visit: The patient's condition can be safely assessed and treated via synchronous audio and visual telemedicine encounter.      Reason for Telemedicine Visit: Patient has requested telehealth visit    Originating Site (Patient Location): Patient's home    Distant Site (Provider Location): Provider Remote Setting- Home Office    Consent:  The patient/guardian has verbally consented to: the potential risks and benefits of telemedicine (video visit) versus in person care; bill my insurance or make self-payment for services provided; and responsibility for payment of non-covered services.     Patient would like the video invitation sent by:  My Chart    Mode of Communication:  Video Conference via Amwell    Distant Location (Provider):  Off-site    As the provider I attest to compliance with applicable laws and regulations related to telemedicine.    DATA  Extended Session (53+ minutes): PROLONGED SERVICE IN THE OUTPATIENT SETTING REQUIRING DIRECT (FACE-TO-FACE) PATIENT CONTACT BEYOND THE USUAL SERVICE:    - Patient's presenting concerns require more intensive intervention than could be completed within the usual service  Interactive Complexity: No  Crisis: No        Progress Since Last Session (Related to Symptoms / Goals / Homework):   Symptoms: No change based on patient self-report.     Homework: Partially completed      Episode of Care Goals: Satisfactory progress - PREPARATION (Decided to change - considering how); Intervened by  negotiating a change plan and determining options / strategies for behavior change, identifying triggers, exploring social supports, and working towards setting a date to begin behavior change     Current / Ongoing Stressors and Concerns:   Patient is a parent and a wife. Patient is currently unemployed. Patient was diagnosed with ADHD in childhood. Patient reports concerns about executive functioning tasks. Patient reports concerns about emotion regulation. Patient reports concerns about routines. Patient reports concerns about sensory sensitivities. Patient reports concerns about an internal conflict. Patient reports concerns about self-judgments.      Treatment Objective(s) Addressed in This Session:   Patient will learn about the diagnosis and symptoms of ADHD.   Patient will learn 5 skills to help increase executive functioning skills.   Client will identify triggers of anxiety and process them in session.       Intervention:   Motivational Interviewing: supported patient in processing and exploring an internal conflict.   Validated patient's emotions. Supported patient in identifying and exploring the message she would like to give to her . Discussed effective communication skills and role played a conversation where patient uses her skills to effectively communicate with her . Supported patient in processing and exploring self-judgments. Discussed how ADHD can impact self-judgments and negative self-worth. Supported patient in exploring positive affirmations from last session. Validated patient's use of skills. Discussed continuing to give herself positive affirmations and being mindful of how this impacts self-judgments.     Assessments completed prior to visit:  The following assessments were completed by patient for this visit:  PHQ9:       2/27/2023     9:42 AM 10/25/2023     5:51 PM 12/2/2024     1:37 PM 1/24/2025     9:31 AM 1/28/2025     9:53 PM 2/21/2025     8:00 AM 3/20/2025    12:00 PM    PHQ-9 SCORE   PHQ-9 Total Score [place] 8 (Mild depression) 7 (Mild depression) 10 (Moderate depression) 5 (Mild depression) 0     PHQ-9 Total Score 8 7 10  5  0  8 8       Patient-reported     GAD7:       5/13/2021     2:00 PM 5/26/2022     3:04 PM 2/27/2023     9:42 AM 12/2/2024     2:04 PM 1/24/2025     9:33 AM 2/21/2025     8:00 AM 3/20/2025    12:00 PM   LOGAN-7 SCORE   Total Score  9 (mild anxiety) 10 (moderate anxiety) 14 (moderate anxiety) 9 (mild anxiety)     Total Score 4 9 10 14  9  12 13       Patient-reported     PROMIS 10-Global Health (only subscores and total score):       12/2/2024     2:06 PM 1/24/2025    10:03 AM   PROMIS-10 Scores Only   Global Mental Health Score 7  9   Global Physical Health Score 14  16   PROMIS TOTAL - SUBSCORES 21  25       Patient-reported         ASSESSMENT: Current Emotional / Mental Status (status of significant symptoms):   Risk status (Self / Other harm or suicidal ideation)   Patient denies current fears or concerns for personal safety.   Patient denies current or recent suicidal ideation or behaviors.   Patient denies current or recent homicidal ideation or behaviors.   Patient denies current or recent self injurious behavior or ideation.   Patient denies other safety concerns.   Patient reports there has been no change in risk factors since their last session.     Patient reports there has been no change in protective factors since their last session.     Recommended that patient call 911 or go to the local ED should there be a change in any of these risk factors     Appearance:   Appropriate    Eye Contact:   Good    Psychomotor Behavior: Normal    Attitude:   Cooperative    Orientation:   All   Speech    Rate / Production: Normal     Volume:  Normal    Mood:    Anxious  Depressed    Affect:    Worrisome    Thought Content:  Rumination    Thought Form:  Coherent    Insight:    Good      Medication Review:   No changes to current psychiatric  medication(s)     Medication Compliance:   Yes     Changes in Health Issues:   None reported     Chemical Use Review:   Substance Use: Chemical use reviewed, no active concerns identified      Tobacco Use: No current tobacco use.      Diagnosis:  1. Moderate episode of recurrent major depressive disorder (H)    2. Attention deficit hyperactivity disorder (ADHD), predominantly inattentive type    3. Anxiety disorder, unspecified type        Collateral Reports Completed:   Not Applicable    PLAN: (Patient Tasks / Therapist Tasks / Other)  Patient will practice using effective communication skills to talk to her . Patient will give herself positive affirmations. Patient will be mindful of how this impacts self-judgments.   We will discuss next session.       Virginie Rosado, [facility] 4/14/2025    __________________________________________________________________    Individual Treatment Plan    Patient's Name: Tracey Crawford  YOB: 1990    Date of Creation: 1/24/2025  Date Treatment Plan Last Reviewed/Revised: 1/24/2025  DSM5 Diagnoses: Attention-Deficit/Hyperactivity Disorder  314.00 (F90.0) Predominantly inattentive presentation, 296.32 (F33.1) Major Depressive Disorder, Recurrent Episode, Moderate _, or 300.09 (F41.8) Other Specified Anxiety Disorder   Psychosocial / Contextual Factors: Patient is a parent and a wife. Patient is currently unemployed.  PROMIS (reviewed every 90 days): The following assessments were completed by patient for this visit:  PROMIS 10-Global Health (only subscores and total score):       12/2/2024     2:06 PM 1/24/2025    10:03 AM   PROMIS-10 Scores Only   Global Mental Health Score 7  9   Global Physical Health Score 14  16   PROMIS TOTAL - SUBSCORES 21  25       Patient-reported       Referral / Collaboration:  Referral to another professional/service is not indicated at this time..    Anticipated number of session for this episode of care:  75  Anticipation frequency of  session: Every other week  Anticipated Duration of each session: 53 or more minutes  Treatment plan will be reviewed in 90 days or when goals have been changed.       MeasurableTreatment Goal(s) related to diagnosis / functional impairment(s)  Goal 1:  Patient will work on stabilizing symptoms of ADHD.    I will know I've met my goal when visually my house will be  and being able to keep up on this and when I'm late less days.       Objective #A  Patient will learn about the diagnosis and symptoms of ADHD.   Status: New - Date: 1/24/2025     Intervention(s)  Therapist will teach about the diagnosis of ADHD.     Objective #B  Patient will use daily planner 50% of the time.                         Status: New - Date: 1/24/2025     Intervention(s)  Therapist will teach organizational skills.     Objective #C  Patient will learn 5 skills to help increase executive functioning skills.   Status: New - Date: 1/24/2025    Intervention(s)  Therapist will assign homework of using coping skills to increase executive functioning skills.    Objective #D  Patient will create and implement daily and weekly routines.   Status: New - Date: 1/24/2025     Intervention(s)  Therapist will teach about how routines help support ADHD and assign homework to create and implement routines.       Goal 2: Client will work on stabilizing anxiety symptoms as evidenced by LOGAN-7 scores (current is 9) and Self report.  Goal is to reduce by five points.      I will know I've met my goal when I'm not over thinking things as much.      Objective #A Client will identify triggers of anxiety and process them in session.    Status: New - Date: 1/24/2025    Intervention(s)  Therapist will teach emotional recognition/identification by assigning homework to journal with written exercises.    Objective #B  Client will identify initial signs or symptoms of anxiety.    Status: New - Date: 1/24/2025    Intervention(s)  Therapist will teach emotional  regulation skills, such as deep breathing and mindfulness skills.    Objective #C  Client will learn 5 new coping skills to reduce symptoms anxiety.   Status: New - Date: 1/24/2025    Intervention(s)  Therapist will provide educational materials on distress tolerance skills and other copings skills.    Objective #D  Client will learn about how ADHD and anxiety are co-morbid.   Status: New - Date: 1/24/2025    Intervention(s)  Therapist will provide educational materials on how ADHD and anxiety disorders can impact each other.      Goal 3: Client will work on stabilizing depressive symptoms as evidenced by PHQ scores (current is 5) and Self report.  Goal is to reduce by five points.      I will know I've met my goal when my self-confidence increases.      Objective #A    Client will bring to session stressors since last visit to process and formulate coping mechanisms.  Status: New - Date: 1/24/2025    Intervention(s)  Therapist will provide educational materials on distress tolerance skills and other copings skills.     Intervention(s)  Therapist will bring to each session thought patterns that contribute to stress and depression, and develop cognitive restructuring techniques to help manage these thought distortions.     Objective #B  Client will incorporate self-care in everyday life to manage physical and mental health.   Status: New - Date: 1/24/2025    Intervention(s)  Therapist will process through with success and failures related to self-care activities.    Objective #C  Client will reduce feeling bad about herself by being more aware of cognitive distortions.  Status: New - Date: 1/24/2025    Intervention(s)  Therapist will teach about cognitive distortions, specifically patterns, and look at ways to be more aware of thoughts.        Patient has reviewed and agreed to the above plan.      SEAN Haines  January 24, 2025

## 2025-04-24 ENCOUNTER — PATIENT OUTREACH (OUTPATIENT)
Dept: CARE COORDINATION | Facility: CLINIC | Age: 35
End: 2025-04-24
Payer: COMMERCIAL

## 2025-04-24 ENCOUNTER — VIRTUAL VISIT (OUTPATIENT)
Dept: PSYCHOLOGY | Facility: CLINIC | Age: 35
End: 2025-04-24
Payer: COMMERCIAL

## 2025-04-24 DIAGNOSIS — F33.1 MODERATE EPISODE OF RECURRENT MAJOR DEPRESSIVE DISORDER (H): Primary | ICD-10-CM

## 2025-04-24 DIAGNOSIS — F90.0 ATTENTION DEFICIT HYPERACTIVITY DISORDER (ADHD), PREDOMINANTLY INATTENTIVE TYPE: ICD-10-CM

## 2025-04-24 DIAGNOSIS — F41.9 ANXIETY DISORDER, UNSPECIFIED TYPE: ICD-10-CM

## 2025-04-24 ASSESSMENT — ANXIETY QUESTIONNAIRES
2. NOT BEING ABLE TO STOP OR CONTROL WORRYING: MORE THAN HALF THE DAYS
7. FEELING AFRAID AS IF SOMETHING AWFUL MIGHT HAPPEN: NOT AT ALL
6. BECOMING EASILY ANNOYED OR IRRITABLE: MORE THAN HALF THE DAYS
4. TROUBLE RELAXING: SEVERAL DAYS
1. FEELING NERVOUS, ANXIOUS, OR ON EDGE: NEARLY EVERY DAY
GAD7 TOTAL SCORE: 10
3. WORRYING TOO MUCH ABOUT DIFFERENT THINGS: SEVERAL DAYS
IF YOU CHECKED OFF ANY PROBLEMS ON THIS QUESTIONNAIRE, HOW DIFFICULT HAVE THESE PROBLEMS MADE IT FOR YOU TO DO YOUR WORK, TAKE CARE OF THINGS AT HOME, OR GET ALONG WITH OTHER PEOPLE: SOMEWHAT DIFFICULT
GAD7 TOTAL SCORE: 10
5. BEING SO RESTLESS THAT IT IS HARD TO SIT STILL: SEVERAL DAYS

## 2025-04-24 ASSESSMENT — PATIENT HEALTH QUESTIONNAIRE - PHQ9: SUM OF ALL RESPONSES TO PHQ QUESTIONS 1-9: 4

## 2025-04-24 NOTE — PROGRESS NOTES
[facility]                                     Progress Note    Patient Name: Tracey Crawford  Date: 4/24/2025         Service Type: Individual      Session Start Time: 2:01 PM  Session End Time: 2:56 PM     Session Length: 53+ min    Session #: 13    Attendees: Client attended alone    Service Modality:  Video Visit:      Provider verified identity through the following two step process.  Patient provided:  Patient is known previously to provider    Telemedicine Visit: The patient's condition can be safely assessed and treated via synchronous audio and visual telemedicine encounter.      Reason for Telemedicine Visit: Patient has requested telehealth visit    Originating Site (Patient Location): Patient's home    Distant Site (Provider Location): Provider Remote Setting- Home Office    Consent:  The patient/guardian has verbally consented to: the potential risks and benefits of telemedicine (video visit) versus in person care; bill my insurance or make self-payment for services provided; and responsibility for payment of non-covered services.     Patient would like the video invitation sent by:  My Chart    Mode of Communication:  Video Conference via Amwell    Distant Location (Provider):  Off-site    As the provider I attest to compliance with applicable laws and regulations related to telemedicine.    DATA  Extended Session (53+ minutes): PROLONGED SERVICE IN THE OUTPATIENT SETTING REQUIRING DIRECT (FACE-TO-FACE) PATIENT CONTACT BEYOND THE USUAL SERVICE:    - Patient's presenting concerns require more intensive intervention than could be completed within the usual service  Interactive Complexity: No  Crisis: No        Progress Since Last Session (Related to Symptoms / Goals / Homework):   Symptoms: Improving based on PHQ-9 and LOGAN-7 scores and patient self-report.     Homework: Partially completed      Episode of Care Goals: Satisfactory progress - PREPARATION (Decided to change - considering  how); Intervened by negotiating a change plan and determining options / strategies for behavior change, identifying triggers, exploring social supports, and working towards setting a date to begin behavior change     Current / Ongoing Stressors and Concerns:   Patient is a parent and a wife. Patient is currently unemployed. Patient was diagnosed with ADHD in childhood. Patient reports concerns about executive functioning tasks. Patient reports concerns about emotion regulation. Patient reports concerns about routines. Patient reports concerns about sensory sensitivities. Patient reports concerns about an internal conflict. Patient reports concerns about self-judgments.      Treatment Objective(s) Addressed in This Session:   Patient will learn about the diagnosis and symptoms of ADHD.   Patient will learn 5 skills to help increase executive functioning skills.   Client will identify triggers of anxiety and process them in session.    Patient will create and implement daily and weekly routines.      Intervention:   Motivational Interviewing: supported patient in processing and exploring concerns about motivation.   Validated patient's emotions. Supported patient in exploring what has worked in the past to increase motivation. Discussed creating a weekly routine to do her laundry. Reviewed how ADHD impacts dopamine production. Discussed pairing an activity that does produce dopamine with doing laundry. Supported patient in identifying activities that do produce dopamine that she can pair with laundry. Discussed being mindful of how effective these skills are at increasing motivation.      Assessments completed prior to visit:  The following assessments were completed by patient for this visit:  PHQ9:       10/25/2023     5:51 PM 12/2/2024     1:37 PM 1/24/2025     9:31 AM 1/28/2025     9:53 PM 2/21/2025     8:00 AM 3/20/2025    12:00 PM 4/24/2025     2:00 PM   PHQ-9 SCORE   PHQ-9 Total Score MyChart 7 (Mild  depression) 10 (Moderate depression) 5 (Mild depression) 0      PHQ-9 Total Score 7 10  5  0  8 8 4       Patient-reported     GAD7:       5/26/2022     3:04 PM 2/27/2023     9:42 AM 12/2/2024     2:04 PM 1/24/2025     9:33 AM 2/21/2025     8:00 AM 3/20/2025    12:00 PM 4/24/2025     2:00 PM   LOGAN-7 SCORE   Total Score 9 (mild anxiety) 10 (moderate anxiety) 14 (moderate anxiety) 9 (mild anxiety)      Total Score 9 10 14  9  12 13 10       Patient-reported     PROMIS 10-Global Health (only subscores and total score):       12/2/2024     2:06 PM 1/24/2025    10:03 AM   PROMIS-10 Scores Only   Global Mental Health Score 7  9   Global Physical Health Score 14  16   PROMIS TOTAL - SUBSCORES 21  25       Patient-reported         ASSESSMENT: Current Emotional / Mental Status (status of significant symptoms):   Risk status (Self / Other harm or suicidal ideation)   Patient denies current fears or concerns for personal safety.   Patient denies current or recent suicidal ideation or behaviors.   Patient denies current or recent homicidal ideation or behaviors.   Patient denies current or recent self injurious behavior or ideation.   Patient denies other safety concerns.   Patient reports there has been no change in risk factors since their last session.     Patient reports there has been no change in protective factors since their last session.     Recommended that patient call 911 or go to the local ED should there be a change in any of these risk factors     Appearance:   Appropriate    Eye Contact:   Good    Psychomotor Behavior: Normal    Attitude:   Cooperative    Orientation:   All   Speech    Rate / Production: Normal     Volume:  Normal    Mood:    Anxious  Depressed    Affect:    Worrisome    Thought Content:  Rumination    Thought Form:  Coherent    Insight:    Good      Medication Review:   No changes to current psychiatric medication(s)     Medication Compliance:   Yes     Changes in Health Issues:   None  reported     Chemical Use Review:   Substance Use: Chemical use reviewed, no active concerns identified      Tobacco Use: No current tobacco use.      Diagnosis:  1. Moderate episode of recurrent major depressive disorder (H)    2. Attention deficit hyperactivity disorder (ADHD), predominantly inattentive type    3. Anxiety disorder, unspecified type        Collateral Reports Completed:   Not Applicable    PLAN: (Patient Tasks / Therapist Tasks / Other)  Patient will practice pairing an activity that does produce dopamine with one that does not. Patient will create a weekly routine for doing laundry. Patient will be mindful of how effective these skills are.   We will discuss next session.       Virginie Rosado, [facility] 4/24/2025    __________________________________________________________________    Individual Treatment Plan    Patient's Name: Tracey Crawford  YOB: 1990    Date of Creation: 1/24/2025  Date Treatment Plan Last Reviewed/Revised: 1/24/2025  DSM5 Diagnoses: Attention-Deficit/Hyperactivity Disorder  314.00 (F90.0) Predominantly inattentive presentation, 296.32 (F33.1) Major Depressive Disorder, Recurrent Episode, Moderate _, or 300.09 (F41.8) Other Specified Anxiety Disorder   Psychosocial / Contextual Factors: Patient is a parent and a wife. Patient is currently unemployed.  PROMIS (reviewed every 90 days): The following assessments were completed by patient for this visit:  PROMIS 10-Global Health (only subscores and total score):       12/2/2024     2:06 PM 1/24/2025    10:03 AM   PROMIS-10 Scores Only   Global Mental Health Score 7  9   Global Physical Health Score 14  16   PROMIS TOTAL - SUBSCORES 21  25       Patient-reported       Referral / Collaboration:  Referral to another professional/service is not indicated at this time..    Anticipated number of session for this episode of care:  75  Anticipation frequency of session: Every other week  Anticipated Duration of each session: 53 or  more minutes  Treatment plan will be reviewed in 90 days or when goals have been changed.       MeasurableTreatment Goal(s) related to diagnosis / functional impairment(s)  Goal 1:  Patient will work on stabilizing symptoms of ADHD.    I will know I've met my goal when visually my house will be  and being able to keep up on this and when I'm late less days.       Objective #A  Patient will learn about the diagnosis and symptoms of ADHD.   Status: New - Date: 1/24/2025     Intervention(s)  Therapist will teach about the diagnosis of ADHD.     Objective #B  Patient will use daily planner 50% of the time.                         Status: New - Date: 1/24/2025     Intervention(s)  Therapist will teach organizational skills.     Objective #C  Patient will learn 5 skills to help increase executive functioning skills.   Status: New - Date: 1/24/2025    Intervention(s)  Therapist will assign homework of using coping skills to increase executive functioning skills.    Objective #D  Patient will create and implement daily and weekly routines.   Status: New - Date: 1/24/2025     Intervention(s)  Therapist will teach about how routines help support ADHD and assign homework to create and implement routines.       Goal 2: Client will work on stabilizing anxiety symptoms as evidenced by LOGAN-7 scores (current is 9) and Self report.  Goal is to reduce by five points.      I will know I've met my goal when I'm not over thinking things as much.      Objective #A Client will identify triggers of anxiety and process them in session.    Status: New - Date: 1/24/2025    Intervention(s)  Therapist will teach emotional recognition/identification by assigning homework to journal with written exercises.    Objective #B  Client will identify initial signs or symptoms of anxiety.    Status: New - Date: 1/24/2025    Intervention(s)  Therapist will teach emotional regulation skills, such as deep breathing and mindfulness  skills.    Objective #C  Client will learn 5 new coping skills to reduce symptoms anxiety.   Status: New - Date: 1/24/2025    Intervention(s)  Therapist will provide educational materials on distress tolerance skills and other copings skills.    Objective #D  Client will learn about how ADHD and anxiety are co-morbid.   Status: New - Date: 1/24/2025    Intervention(s)  Therapist will provide educational materials on how ADHD and anxiety disorders can impact each other.      Goal 3: Client will work on stabilizing depressive symptoms as evidenced by PHQ scores (current is 5) and Self report.  Goal is to reduce by five points.      I will know I've met my goal when my self-confidence increases.      Objective #A    Client will bring to session stressors since last visit to process and formulate coping mechanisms.  Status: New - Date: 1/24/2025    Intervention(s)  Therapist will provide educational materials on distress tolerance skills and other copings skills.     Intervention(s)  Therapist will bring to each session thought patterns that contribute to stress and depression, and develop cognitive restructuring techniques to help manage these thought distortions.     Objective #B  Client will incorporate self-care in everyday life to manage physical and mental health.   Status: New - Date: 1/24/2025    Intervention(s)  Therapist will process through with success and failures related to self-care activities.    Objective #C  Client will reduce feeling bad about herself by being more aware of cognitive distortions.  Status: New - Date: 1/24/2025    Intervention(s)  Therapist will teach about cognitive distortions, specifically patterns, and look at ways to be more aware of thoughts.        Patient has reviewed and agreed to the above plan.      Virginie Rosado, [facility]  January 24, 2025

## 2025-05-08 ENCOUNTER — VIRTUAL VISIT (OUTPATIENT)
Dept: PSYCHOLOGY | Facility: CLINIC | Age: 35
End: 2025-05-08
Payer: COMMERCIAL

## 2025-05-08 DIAGNOSIS — F33.1 MODERATE EPISODE OF RECURRENT MAJOR DEPRESSIVE DISORDER (H): Primary | ICD-10-CM

## 2025-05-08 DIAGNOSIS — F90.0 ATTENTION DEFICIT HYPERACTIVITY DISORDER (ADHD), PREDOMINANTLY INATTENTIVE TYPE: ICD-10-CM

## 2025-05-08 DIAGNOSIS — F41.9 ANXIETY DISORDER, UNSPECIFIED TYPE: ICD-10-CM

## 2025-05-08 ASSESSMENT — ANXIETY QUESTIONNAIRES
2. NOT BEING ABLE TO STOP OR CONTROL WORRYING: MORE THAN HALF THE DAYS
1. FEELING NERVOUS, ANXIOUS, OR ON EDGE: MORE THAN HALF THE DAYS
GAD7 TOTAL SCORE: 13
GAD7 TOTAL SCORE: 13
7. FEELING AFRAID AS IF SOMETHING AWFUL MIGHT HAPPEN: NOT AT ALL
3. WORRYING TOO MUCH ABOUT DIFFERENT THINGS: NEARLY EVERY DAY
6. BECOMING EASILY ANNOYED OR IRRITABLE: MORE THAN HALF THE DAYS
5. BEING SO RESTLESS THAT IT IS HARD TO SIT STILL: SEVERAL DAYS
4. TROUBLE RELAXING: NEARLY EVERY DAY
IF YOU CHECKED OFF ANY PROBLEMS ON THIS QUESTIONNAIRE, HOW DIFFICULT HAVE THESE PROBLEMS MADE IT FOR YOU TO DO YOUR WORK, TAKE CARE OF THINGS AT HOME, OR GET ALONG WITH OTHER PEOPLE: VERY DIFFICULT

## 2025-05-08 ASSESSMENT — COLUMBIA-SUICIDE SEVERITY RATING SCALE - C-SSRS
TOTAL  NUMBER OF ABORTED OR SELF INTERRUPTED ATTEMPTS SINCE LAST CONTACT: NO
2. HAVE YOU ACTUALLY HAD ANY THOUGHTS OF KILLING YOURSELF?: NO
SUICIDE, SINCE LAST CONTACT: NO
ATTEMPT SINCE LAST CONTACT: NO
6. HAVE YOU EVER DONE ANYTHING, STARTED TO DO ANYTHING, OR PREPARED TO DO ANYTHING TO END YOUR LIFE?: NO
1. SINCE LAST CONTACT, HAVE YOU WISHED YOU WERE DEAD OR WISHED YOU COULD GO TO SLEEP AND NOT WAKE UP?: NO
TOTAL  NUMBER OF INTERRUPTED ATTEMPTS SINCE LAST CONTACT: NO

## 2025-05-08 ASSESSMENT — PATIENT HEALTH QUESTIONNAIRE - PHQ9: SUM OF ALL RESPONSES TO PHQ QUESTIONS 1-9: 6

## 2025-05-08 NOTE — PROGRESS NOTES
[facility]                                     Progress Note    Patient Name: Tracey Crawford  Date: 5/8/2025         Service Type: Individual      Session Start Time: 1:54 PM  Session End Time: 2:50 PM     Session Length: 53+ min    Session #: 14    Attendees: Client attended alone    Service Modality:  Video Visit:      Provider verified identity through the following two step process.  Patient provided:  Patient is known previously to provider    Telemedicine Visit: The patient's condition can be safely assessed and treated via synchronous audio and visual telemedicine encounter.      Reason for Telemedicine Visit: Patient has requested telehealth visit    Originating Site (Patient Location): Patient's home    Distant Site (Provider Location): Provider Remote Setting- Home Office    Consent:  The patient/guardian has verbally consented to: the potential risks and benefits of telemedicine (video visit) versus in person care; bill my insurance or make self-payment for services provided; and responsibility for payment of non-covered services.     Patient would like the video invitation sent by:  My Chart    Mode of Communication:  Video Conference via Amwell    Distant Location (Provider):  Off-site    As the provider I attest to compliance with applicable laws and regulations related to telemedicine.    DATA  Extended Session (53+ minutes): PROLONGED SERVICE IN THE OUTPATIENT SETTING REQUIRING DIRECT (FACE-TO-FACE) PATIENT CONTACT BEYOND THE USUAL SERVICE:    - Patient's presenting concerns require more intensive intervention than could be completed within the usual service  Interactive Complexity: No  Crisis: No        Progress Since Last Session (Related to Symptoms / Goals / Homework):   Symptoms: Worsening based on PHQ-9 and LOGAN-7 scores and patient self-report.    Homework: Partially completed      Episode of Care Goals: Satisfactory progress - PREPARATION (Decided to change - considering  how); Intervened by negotiating a change plan and determining options / strategies for behavior change, identifying triggers, exploring social supports, and working towards setting a date to begin behavior change     Current / Ongoing Stressors and Concerns:   Patient is a parent and a wife. Patient is currently unemployed. Patient was diagnosed with ADHD in childhood. Patient reports concerns about executive functioning tasks. Patient reports concerns about emotion regulation. Patient reports concerns about routines. Patient reports concerns about sensory sensitivities. Patient reports concerns about an internal conflict. Patient reports concerns about self-judgments.      Treatment Objective(s) Addressed in This Session:   Patient will learn about the diagnosis and symptoms of ADHD.   Patient will learn 5 skills to help increase executive functioning skills.   Client will identify triggers of anxiety and process them in session.    Patient will create and implement daily and weekly routines.      Intervention:   Motivational Interviewing: supported patient in processing and exploring perfectionism.  Validated patient's emotions. Supported patient in processing and exploring how perfectionism symptoms impact her daily functioning. Supported patient in exploring self-judgments related to perfectionism. Discussed how ADHD can impact symptoms of perfectionism. Supported patient in exploring how impulsivity impacts these symptoms for her. Taught check the facts skill. Coached patient on identifying facts about the situation. Discussed practicing check the facts skill to help reduce self-judgments and perfectionism symptoms. Discussed being mindful of how effective this is.     Reviewed treatment plan and goals with patient.     Assessments completed prior to visit:  The following assessments were completed by patient for this visit:  PHQ9:       12/2/2024     1:37 PM 1/24/2025     9:31 AM 1/28/2025     9:53 PM  2/21/2025     8:00 AM 3/20/2025    12:00 PM 4/24/2025     2:00 PM 5/8/2025     1:00 PM   PHQ-9 SCORE   PHQ-9 Total Score MyChart 10 (Moderate depression) 5 (Mild depression) 0       PHQ-9 Total Score 10  5  0  8 8 4 6       Patient-reported     GAD7:       2/27/2023     9:42 AM 12/2/2024     2:04 PM 1/24/2025     9:33 AM 2/21/2025     8:00 AM 3/20/2025    12:00 PM 4/24/2025     2:00 PM 5/8/2025     1:00 PM   LOGAN-7 SCORE   Total Score 10 (moderate anxiety) 14 (moderate anxiety) 9 (mild anxiety)       Total Score 10 14  9  12 13 10 13       Patient-reported     PROMIS 10-Global Health (only subscores and total score):       12/2/2024     2:06 PM 1/24/2025    10:03 AM 5/8/2025     1:59 PM   PROMIS-10 Scores Only   Global Mental Health Score 7  9 13   Global Physical Health Score 14  16 18   PROMIS TOTAL - SUBSCORES 21  25 31       Patient-reported         ASSESSMENT: Current Emotional / Mental Status (status of significant symptoms):   Risk status (Self / Other harm or suicidal ideation)   Patient denies current fears or concerns for personal safety.   Patient denies current or recent suicidal ideation or behaviors.   Patient denies current or recent homicidal ideation or behaviors.   Patient denies current or recent self injurious behavior or ideation.   Patient denies other safety concerns.   Patient reports there has been no change in risk factors since their last session.     Patient reports there has been no change in protective factors since their last session.     Recommended that patient call 911 or go to the local ED should there be a change in any of these risk factors     Appearance:   Appropriate    Eye Contact:   Good    Psychomotor Behavior: Normal    Attitude:   Cooperative    Orientation:   All   Speech    Rate / Production: Normal     Volume:  Normal    Mood:    Anxious  Depressed    Affect:    Worrisome    Thought Content:  Rumination    Thought Form:  Coherent    Insight:    Good      Medication  Review:   No changes to current psychiatric medication(s)     Medication Compliance:   Yes     Changes in Health Issues:   None reported     Chemical Use Review:   Substance Use: Chemical use reviewed, no active concerns identified      Tobacco Use: No current tobacco use.      Diagnosis:  1. Moderate episode of recurrent major depressive disorder (H)    2. Attention deficit hyperactivity disorder (ADHD), predominantly inattentive type    3. Anxiety disorder, unspecified type        Collateral Reports Completed:   Not Applicable    PLAN: (Patient Tasks / Therapist Tasks / Other)  Patient will practice check the facts skill to help reduce self-judgments. Patient will be mindful of how effective this skills is.   We will discuss next session.       Virginie Rosado, [facility] 5/8/2025    __________________________________________________________________    Individual Treatment Plan    Patient's Name: Tracey Crawford  YOB: 1990    Date of Creation: 1/24/2025  Date Treatment Plan Last Reviewed/Revised: 5/8/2025  DSM5 Diagnoses: Attention-Deficit/Hyperactivity Disorder  314.00 (F90.0) Predominantly inattentive presentation, 296.32 (F33.1) Major Depressive Disorder, Recurrent Episode, Moderate _, or 300.09 (F41.8) Other Specified Anxiety Disorder   Psychosocial / Contextual Factors: Patient is a parent and a wife. Patient is currently unemployed.  PROMIS (reviewed every 90 days): The following assessments were completed by patient for this visit:  PROMIS 10-Global Health (only subscores and total score):       12/2/2024     2:06 PM 1/24/2025    10:03 AM   PROMIS-10 Scores Only   Global Mental Health Score 7  9   Global Physical Health Score 14  16   PROMIS TOTAL - SUBSCORES 21  25       Patient-reported       Referral / Collaboration:  Referral to another professional/service is not indicated at this time..    Anticipated number of session for this episode of care: 75  Anticipation frequency of session: Every other  week  Anticipated Duration of each session: 53 or more minutes  Treatment plan will be reviewed in 90 days or when goals have been changed.       MeasurableTreatment Goal(s) related to diagnosis / functional impairment(s)  Goal 1:  Patient will work on stabilizing symptoms of ADHD.    I will know I've met my goal when visually my house will be  and being able to keep up on this and when I'm late less days.       Objective #A  Patient will learn about the diagnosis and symptoms of ADHD.   Status: Continued - Date: 5/8/2025     Intervention(s)  Therapist will teach about the diagnosis of ADHD.     Objective #B  Patient will use daily planner 50% of the time.                         Status: Continued - Date: 5/8/2025     Intervention(s)  Therapist will teach organizational skills.     Objective #C  Patient will learn 5 skills to help increase executive functioning skills.   Status: Continued - Date: 5/8/2025    Intervention(s)  Therapist will assign homework of using coping skills to increase executive functioning skills.    Objective #D  Patient will create and implement daily and weekly routines.   Status: Continued - Date: 5/8/2025     Intervention(s)  Therapist will teach about how routines help support ADHD and assign homework to create and implement routines.       Goal 2: Client will work on stabilizing anxiety symptoms as evidenced by LOGAN-7 scores (current is 13) and Self report.  Goal is to reduce by five points.      I will know I've met my goal when I'm not over thinking things as much.      Objective #A Client will identify triggers of anxiety and process them in session.    Status: Continued - Date: 5/8/2025    Intervention(s)  Therapist will teach emotional recognition/identification by assigning homework to journal with written exercises.    Objective #B  Client will identify initial signs or symptoms of anxiety.    Status: Continued - Date: 5/8/2025    Intervention(s)  Therapist will teach  emotional regulation skills, such as deep breathing and mindfulness skills.    Objective #C  Client will learn 5 new coping skills to reduce symptoms anxiety.   Status: Continued - Date: 5/8/2025    Intervention(s)  Therapist will provide educational materials on distress tolerance skills and other copings skills.    Objective #D  Client will learn about how ADHD and anxiety are co-morbid.   Status: Continued - Date: 5/8/2025    Intervention(s)  Therapist will provide educational materials on how ADHD and anxiety disorders can impact each other.      Goal 3: Client will work on stabilizing depressive symptoms as evidenced by PHQ scores (current is 4) and Self report.  Goal is to maintain a score under 5 points.     I will know I've met my goal when my self-confidence increases.      Objective #A    Client will bring to session stressors since last visit to process and formulate coping mechanisms.  Status: Continued - Date: 5/8/2025    Intervention(s)  Therapist will provide educational materials on distress tolerance skills and other copings skills.     Intervention(s)  Therapist will bring to each session thought patterns that contribute to stress and depression, and develop cognitive restructuring techniques to help manage these thought distortions.     Objective #B  Client will incorporate self-care in everyday life to manage physical and mental health.   Status: Continued - Date: 5/8/2025    Intervention(s)  Therapist will process through with success and failures related to self-care activities.    Objective #C  Client will reduce feeling bad about herself by being more aware of cognitive distortions.  Status: Continued - Date: 5/8/2025    Intervention(s)  Therapist will teach about cognitive distortions, specifically patterns, and look at ways to be more aware of thoughts.        Patient has reviewed and agreed to the above plan.      Virginie Rosado [facility]  January 24, 2025 Reviewed 5/8/2025

## 2025-05-27 ENCOUNTER — VIRTUAL VISIT (OUTPATIENT)
Dept: PSYCHOLOGY | Facility: CLINIC | Age: 35
End: 2025-05-27
Payer: COMMERCIAL

## 2025-05-27 DIAGNOSIS — F33.1 MODERATE EPISODE OF RECURRENT MAJOR DEPRESSIVE DISORDER (H): Primary | ICD-10-CM

## 2025-05-27 DIAGNOSIS — F41.9 ANXIETY DISORDER, UNSPECIFIED TYPE: ICD-10-CM

## 2025-05-27 DIAGNOSIS — F90.0 ATTENTION DEFICIT HYPERACTIVITY DISORDER (ADHD), PREDOMINANTLY INATTENTIVE TYPE: ICD-10-CM

## 2025-05-27 PROCEDURE — 90837 PSYTX W PT 60 MINUTES: CPT | Mod: 95

## 2025-05-27 NOTE — PROGRESS NOTES
[facility]                                     Progress Note    Patient Name: Tracey Crawford  Date: 5/27/2025         Service Type: Individual      Session Start Time: 3:03 PM  Session End Time: 3:59 PM     Session Length: 53+ min    Session #: 15    Attendees: Client attended alone    Service Modality:  Video Visit:      Provider verified identity through the following two step process.  Patient provided:  Patient is known previously to provider    Telemedicine Visit: The patient's condition can be safely assessed and treated via synchronous audio and visual telemedicine encounter.      Reason for Telemedicine Visit: Patient has requested telehealth visit    Originating Site (Patient Location): Patient's home    Distant Site (Provider Location): Provider Remote Setting- Home Office    Consent:  The patient/guardian has verbally consented to: the potential risks and benefits of telemedicine (video visit) versus in person care; bill my insurance or make self-payment for services provided; and responsibility for payment of non-covered services.     Patient would like the video invitation sent by:  My Chart    Mode of Communication:  Video Conference via Amwell    Distant Location (Provider):  Off-site    As the provider I attest to compliance with applicable laws and regulations related to telemedicine.    DATA  Extended Session (53+ minutes): PROLONGED SERVICE IN THE OUTPATIENT SETTING REQUIRING DIRECT (FACE-TO-FACE) PATIENT CONTACT BEYOND THE USUAL SERVICE:    - Patient's presenting concerns require more intensive intervention than could be completed within the usual service  Interactive Complexity: No  Crisis: No        Progress Since Last Session (Related to Symptoms / Goals / Homework):   Symptoms: No change based on patient self-report.     Homework: Partially completed      Episode of Care Goals: Satisfactory progress - PREPARATION (Decided to change - considering how); Intervened by  negotiating a change plan and determining options / strategies for behavior change, identifying triggers, exploring social supports, and working towards setting a date to begin behavior change     Current / Ongoing Stressors and Concerns:   Patient is a parent and a wife. Patient is currently unemployed. Patient was diagnosed with ADHD in childhood. Patient reports concerns about executive functioning tasks. Patient reports concerns about emotion regulation. Patient reports concerns about routines. Patient reports concerns about sensory sensitivities. Patient reports concerns about self-judgments. Patient reports concerns about perfectionism.      Treatment Objective(s) Addressed in This Session:   Patient will learn about the diagnosis and symptoms of ADHD.   Patient will learn 5 skills to help increase executive functioning skills.   Client will identify triggers of anxiety and process them in session.    Patient will create and implement daily and weekly routines.      Intervention:   Motivational Interviewing: supported patient in processing and exploring perfectionism.  Validated patient's emotions. Discussed how ADHD can impact perfectionism. Supported patient in processing and exploring receiving praise and how this feels. Supported patient in processing and exploring the all or nothing thinking with perfectionism. Supported patient in identifying the in between of all or nothing. Supported patient in processing and exploring how it feels to validate herself. Discussed practicing validating herself and saying the validation aloud if needed. Discussed being mindful of how it feels to do so.    Reviewed attendance agreement with patient. Patient reports she understands and agrees.     Assessments completed prior to visit:  The following assessments were completed by patient for this visit:  PHQ9:       12/2/2024     1:37 PM 1/24/2025     9:31 AM 1/28/2025     9:53 PM 2/21/2025     8:00 AM 3/20/2025    12:00 PM  4/24/2025     2:00 PM 5/8/2025     1:00 PM   PHQ-9 SCORE   PHQ-9 Total Score MyChart 10 (Moderate depression) 5 (Mild depression) 0       PHQ-9 Total Score 10  5  0  8 8 4 6       Patient-reported     GAD7:       2/27/2023     9:42 AM 12/2/2024     2:04 PM 1/24/2025     9:33 AM 2/21/2025     8:00 AM 3/20/2025    12:00 PM 4/24/2025     2:00 PM 5/8/2025     1:00 PM   LOGAN-7 SCORE   Total Score 10 (moderate anxiety) 14 (moderate anxiety) 9 (mild anxiety)       Total Score 10 14  9  12 13 10 13       Patient-reported     PROMIS 10-Global Health (only subscores and total score):       12/2/2024     2:06 PM 1/24/2025    10:03 AM 5/8/2025     1:59 PM   PROMIS-10 Scores Only   Global Mental Health Score 7  9 13   Global Physical Health Score 14  16 18   PROMIS TOTAL - SUBSCORES 21  25 31       Patient-reported         ASSESSMENT: Current Emotional / Mental Status (status of significant symptoms):   Risk status (Self / Other harm or suicidal ideation)   Patient denies current fears or concerns for personal safety.   Patient denies current or recent suicidal ideation or behaviors.   Patient denies current or recent homicidal ideation or behaviors.   Patient denies current or recent self injurious behavior or ideation.   Patient denies other safety concerns.   Patient reports there has been no change in risk factors since their last session.     Patient reports there has been no change in protective factors since their last session.     Recommended that patient call 911 or go to the local ED should there be a change in any of these risk factors     Appearance:   Appropriate    Eye Contact:   Good    Psychomotor Behavior: Normal    Attitude:   Cooperative    Orientation:   All   Speech    Rate / Production: Normal     Volume:  Normal    Mood:    Anxious  Depressed    Affect:    Worrisome    Thought Content:  Rumination    Thought Form:  Coherent    Insight:    Good      Medication Review:   No changes to current psychiatric  medication(s)     Medication Compliance:   Yes     Changes in Health Issues:   None reported     Chemical Use Review:   Substance Use: Chemical use reviewed, no active concerns identified      Tobacco Use: No current tobacco use.      Diagnosis:  1. Moderate episode of recurrent major depressive disorder (H)    2. Attention deficit hyperactivity disorder (ADHD), predominantly inattentive type    3. Anxiety disorder, unspecified type        Collateral Reports Completed:   Not Applicable    PLAN: (Patient Tasks / Therapist Tasks / Other)  Patient will practice validating her own emotions and experiences. Patient will be mindful of how it feels to do so.    We will discuss next session.       Virginie Rosado, [facility] 5/27/2025    __________________________________________________________________    Individual Treatment Plan    Patient's Name: Tracey Crawford  YOB: 1990    Date of Creation: 1/24/2025  Date Treatment Plan Last Reviewed/Revised: 5/8/2025  DSM5 Diagnoses: Attention-Deficit/Hyperactivity Disorder  314.00 (F90.0) Predominantly inattentive presentation, 296.32 (F33.1) Major Depressive Disorder, Recurrent Episode, Moderate _, or 300.09 (F41.8) Other Specified Anxiety Disorder   Psychosocial / Contextual Factors: Patient is a parent and a wife. Patient is currently unemployed.  PROMIS (reviewed every 90 days): The following assessments were completed by patient for this visit:  PROMIS 10-Global Health (only subscores and total score):       12/2/2024     2:06 PM 1/24/2025    10:03 AM 5/8/2025     1:59 PM   PROMIS-10 Scores Only   Global Mental Health Score 7  9 13   Global Physical Health Score 14  16 18   PROMIS TOTAL - SUBSCORES 21  25 31       Patient-reported       Referral / Collaboration:  Referral to another professional/service is not indicated at this time..    Anticipated number of session for this episode of care: 75  Anticipation frequency of session: Every other week  Anticipated Duration of  each session: 53 or more minutes  Treatment plan will be reviewed in 90 days or when goals have been changed.       MeasurableTreatment Goal(s) related to diagnosis / functional impairment(s)  Goal 1:  Patient will work on stabilizing symptoms of ADHD.    I will know I've met my goal when visually my house will be  and being able to keep up on this and when I'm late less days.       Objective #A  Patient will learn about the diagnosis and symptoms of ADHD.   Status: Continued - Date: 5/8/2025     Intervention(s)  Therapist will teach about the diagnosis of ADHD.     Objective #B  Patient will use daily planner 50% of the time.                         Status: Continued - Date: 5/8/2025     Intervention(s)  Therapist will teach organizational skills.     Objective #C  Patient will learn 5 skills to help increase executive functioning skills.   Status: Continued - Date: 5/8/2025    Intervention(s)  Therapist will assign homework of using coping skills to increase executive functioning skills.    Objective #D  Patient will create and implement daily and weekly routines.   Status: Continued - Date: 5/8/2025     Intervention(s)  Therapist will teach about how routines help support ADHD and assign homework to create and implement routines.       Goal 2: Client will work on stabilizing anxiety symptoms as evidenced by LOGAN-7 scores (current is 13) and Self report.  Goal is to reduce by five points.      I will know I've met my goal when I'm not over thinking things as much.      Objective #A Client will identify triggers of anxiety and process them in session.    Status: Continued - Date: 5/8/2025    Intervention(s)  Therapist will teach emotional recognition/identification by assigning homework to journal with written exercises.    Objective #B  Client will identify initial signs or symptoms of anxiety.    Status: Continued - Date: 5/8/2025    Intervention(s)  Therapist will teach emotional regulation skills, such as  deep breathing and mindfulness skills.    Objective #C  Client will learn 5 new coping skills to reduce symptoms anxiety.   Status: Continued - Date: 5/8/2025    Intervention(s)  Therapist will provide educational materials on distress tolerance skills and other copings skills.    Objective #D  Client will learn about how ADHD and anxiety are co-morbid.   Status: Continued - Date: 5/8/2025    Intervention(s)  Therapist will provide educational materials on how ADHD and anxiety disorders can impact each other.      Goal 3: Client will work on stabilizing depressive symptoms as evidenced by PHQ scores (current is 4) and Self report.  Goal is to maintain a score under 5 points.     I will know I've met my goal when my self-confidence increases.      Objective #A    Client will bring to session stressors since last visit to process and formulate coping mechanisms.  Status: Continued - Date: 5/8/2025    Intervention(s)  Therapist will provide educational materials on distress tolerance skills and other copings skills.     Intervention(s)  Therapist will bring to each session thought patterns that contribute to stress and depression, and develop cognitive restructuring techniques to help manage these thought distortions.     Objective #B  Client will incorporate self-care in everyday life to manage physical and mental health.   Status: Continued - Date: 5/8/2025    Intervention(s)  Therapist will process through with success and failures related to self-care activities.    Objective #C  Client will reduce feeling bad about herself by being more aware of cognitive distortions.  Status: Continued - Date: 5/8/2025    Intervention(s)  Therapist will teach about cognitive distortions, specifically patterns, and look at ways to be more aware of thoughts.        Patient has reviewed and agreed to the above plan.      Virginie Rosado [facility] Center[facility]OB[facility]  January 24, 2025 Reviewed 5/8/2025

## 2025-06-03 ENCOUNTER — VIRTUAL VISIT (OUTPATIENT)
Dept: PSYCHOLOGY | Facility: CLINIC | Age: 35
End: 2025-06-03
Payer: COMMERCIAL

## 2025-06-03 DIAGNOSIS — F90.0 ATTENTION DEFICIT HYPERACTIVITY DISORDER (ADHD), PREDOMINANTLY INATTENTIVE TYPE: ICD-10-CM

## 2025-06-03 DIAGNOSIS — F33.1 MODERATE EPISODE OF RECURRENT MAJOR DEPRESSIVE DISORDER (H): Primary | ICD-10-CM

## 2025-06-03 DIAGNOSIS — F41.9 ANXIETY DISORDER, UNSPECIFIED TYPE: ICD-10-CM

## 2025-06-03 PROCEDURE — 90837 PSYTX W PT 60 MINUTES: CPT | Mod: 95

## 2025-06-03 ASSESSMENT — ANXIETY QUESTIONNAIRES
GAD7 TOTAL SCORE: 9
6. BECOMING EASILY ANNOYED OR IRRITABLE: SEVERAL DAYS
5. BEING SO RESTLESS THAT IT IS HARD TO SIT STILL: SEVERAL DAYS
3. WORRYING TOO MUCH ABOUT DIFFERENT THINGS: SEVERAL DAYS
GAD7 TOTAL SCORE: 9
2. NOT BEING ABLE TO STOP OR CONTROL WORRYING: SEVERAL DAYS
4. TROUBLE RELAXING: MORE THAN HALF THE DAYS
7. FEELING AFRAID AS IF SOMETHING AWFUL MIGHT HAPPEN: SEVERAL DAYS
1. FEELING NERVOUS, ANXIOUS, OR ON EDGE: MORE THAN HALF THE DAYS
IF YOU CHECKED OFF ANY PROBLEMS ON THIS QUESTIONNAIRE, HOW DIFFICULT HAVE THESE PROBLEMS MADE IT FOR YOU TO DO YOUR WORK, TAKE CARE OF THINGS AT HOME, OR GET ALONG WITH OTHER PEOPLE: SOMEWHAT DIFFICULT

## 2025-06-03 ASSESSMENT — PATIENT HEALTH QUESTIONNAIRE - PHQ9: SUM OF ALL RESPONSES TO PHQ QUESTIONS 1-9: 7

## 2025-06-03 NOTE — PROGRESS NOTES
[facility]                                     Progress Note    Patient Name: Tracey Crawford  Date: 6/3/2025         Service Type: Individual      Session Start Time: 1:58 PM  Session End Time: 2:55 PM     Session Length: 53+ min    Session #: 16    Attendees: Client attended alone    Service Modality:  Video Visit:      Provider verified identity through the following two step process.  Patient provided:  Patient is known previously to provider    Telemedicine Visit: The patient's condition can be safely assessed and treated via synchronous audio and visual telemedicine encounter.      Reason for Telemedicine Visit: Patient has requested telehealth visit    Originating Site (Patient Location): Patient's home    Distant Site (Provider Location): Provider Remote Setting- Home Office    Consent:  The patient/guardian has verbally consented to: the potential risks and benefits of telemedicine (video visit) versus in person care; bill my insurance or make self-payment for services provided; and responsibility for payment of non-covered services.     Patient would like the video invitation sent by:  My Chart    Mode of Communication:  Video Conference via Amwell    Distant Location (Provider):  Off-site    As the provider I attest to compliance with applicable laws and regulations related to telemedicine.    DATA  Extended Session (53+ minutes): PROLONGED SERVICE IN THE OUTPATIENT SETTING REQUIRING DIRECT (FACE-TO-FACE) PATIENT CONTACT BEYOND THE USUAL SERVICE:    - Patient's presenting concerns require more intensive intervention than could be completed within the usual service  Interactive Complexity: No  Crisis: No        Progress Since Last Session (Related to Symptoms / Goals / Homework):   Symptoms: No change based on patient self-report.     Homework: Partially completed      Episode of Care Goals: Satisfactory progress - PREPARATION (Decided to change - considering how); Intervened by  negotiating a change plan and determining options / strategies for behavior change, identifying triggers, exploring social supports, and working towards setting a date to begin behavior change     Current / Ongoing Stressors and Concerns:   Patient is a parent and a wife. Patient is currently unemployed. Patient was diagnosed with ADHD in childhood. Patient reports concerns about executive functioning tasks. Patient reports concerns about emotion regulation. Patient reports concerns about routines. Patient reports concerns about sensory sensitivities. Patient reports concerns about self-judgments. Patient reports concerns about perfectionism. Patient reports concerns about finances.      Treatment Objective(s) Addressed in This Session:   Patient will learn about the diagnosis and symptoms of ADHD.   Patient will learn 5 skills to help increase executive functioning skills.   Client will identify triggers of anxiety and process them in session.    Patient will create and implement daily and weekly routines.      Intervention:   Motivational Interviewing: supported patient in processing and exploring finances as a stressor.  Validated patient's emotions. Supported patient in processing and exploring how finances impact her mental health. Supported patient in identifying and exploring what she can control related to finances. Supported patient in processing and exploring her weekly routines. Discussed how ADHD impacts routines and schedules. Discussed identifying a schedule to complete laundry during the week. Discussed being mindful of how effective this is.      Assessments completed prior to visit:  The following assessments were completed by patient for this visit:  PHQ9:       12/2/2024     1:37 PM 1/24/2025     9:31 AM 1/28/2025     9:53 PM 2/21/2025     8:00 AM 3/20/2025    12:00 PM 4/24/2025     2:00 PM 5/8/2025     1:00 PM   PHQ-9 SCORE   PHQ-9 Total Score [place] 10 (Moderate depression) 5 (Mild depression)  0       PHQ-9 Total Score 10  5  0  8 8 4 6       Patient-reported     GAD7:       2/27/2023     9:42 AM 12/2/2024     2:04 PM 1/24/2025     9:33 AM 2/21/2025     8:00 AM 3/20/2025    12:00 PM 4/24/2025     2:00 PM 5/8/2025     1:00 PM   LOGAN-7 SCORE   Total Score 10 (moderate anxiety) 14 (moderate anxiety) 9 (mild anxiety)       Total Score 10 14  9  12 13 10 13       Patient-reported     PROMIS 10-Global Health (only subscores and total score):       12/2/2024     2:06 PM 1/24/2025    10:03 AM 5/8/2025     1:59 PM   PROMIS-10 Scores Only   Global Mental Health Score 7  9 13   Global Physical Health Score 14  16 18   PROMIS TOTAL - SUBSCORES 21  25 31       Patient-reported         ASSESSMENT: Current Emotional / Mental Status (status of significant symptoms):   Risk status (Self / Other harm or suicidal ideation)   Patient denies current fears or concerns for personal safety.   Patient denies current or recent suicidal ideation or behaviors.   Patient denies current or recent homicidal ideation or behaviors.   Patient denies current or recent self injurious behavior or ideation.   Patient denies other safety concerns.   Patient reports there has been no change in risk factors since their last session.     Patient reports there has been no change in protective factors since their last session.     Recommended that patient call 911 or go to the local ED should there be a change in any of these risk factors     Appearance:   Appropriate    Eye Contact:   Good    Psychomotor Behavior: Normal    Attitude:   Cooperative    Orientation:   All   Speech    Rate / Production: Normal     Volume:  Normal    Mood:    Anxious  Depressed    Affect:    Worrisome    Thought Content:  Rumination    Thought Form:  Coherent    Insight:    Good      Medication Review:   No changes to current psychiatric medication(s)     Medication Compliance:   Yes     Changes in Health Issues:   None reported     Chemical Use Review:   Substance Use:  Chemical use reviewed, no active concerns identified      Tobacco Use: No current tobacco use.      Diagnosis:  1. Moderate episode of recurrent major depressive disorder (H)    2. Attention deficit hyperactivity disorder (ADHD), predominantly inattentive type    3. Anxiety disorder, unspecified type        Collateral Reports Completed:   Not Applicable    PLAN: (Patient Tasks / Therapist Tasks / Other)  Patient will identify a schedule to complete laundry during the week.   We will discuss next session.       Virginie Rosado, [facility] 6/3/2025    __________________________________________________________________    Individual Treatment Plan    Patient's Name: Tracey Crawford  YOB: 1990    Date of Creation: 1/24/2025  Date Treatment Plan Last Reviewed/Revised: 5/8/2025  DSM5 Diagnoses: Attention-Deficit/Hyperactivity Disorder  314.00 (F90.0) Predominantly inattentive presentation, 296.32 (F33.1) Major Depressive Disorder, Recurrent Episode, Moderate _, or 300.09 (F41.8) Other Specified Anxiety Disorder   Psychosocial / Contextual Factors: Patient is a parent and a wife. Patient is currently unemployed.  PROMIS (reviewed every 90 days): The following assessments were completed by patient for this visit:  PROMIS 10-Global Health (only subscores and total score):       12/2/2024     2:06 PM 1/24/2025    10:03 AM 5/8/2025     1:59 PM   PROMIS-10 Scores Only   Global Mental Health Score 7  9 13   Global Physical Health Score 14  16 18   PROMIS TOTAL - SUBSCORES 21  25 31       Patient-reported       Referral / Collaboration:  Referral to another professional/service is not indicated at this time..    Anticipated number of session for this episode of care: 75  Anticipation frequency of session: Every other week  Anticipated Duration of each session: 53 or more minutes  Treatment plan will be reviewed in 90 days or when goals have been changed.       MeasurableTreatment Goal(s) related to diagnosis / functional  impairment(s)  Goal 1:  Patient will work on stabilizing symptoms of ADHD.    I will know I've met my goal when visually my house will be  and being able to keep up on this and when I'm late less days.       Objective #A  Patient will learn about the diagnosis and symptoms of ADHD.   Status: Continued - Date: 5/8/2025     Intervention(s)  Therapist will teach about the diagnosis of ADHD.     Objective #B  Patient will use daily planner 50% of the time.                         Status: Continued - Date: 5/8/2025     Intervention(s)  Therapist will teach organizational skills.     Objective #C  Patient will learn 5 skills to help increase executive functioning skills.   Status: Continued - Date: 5/8/2025    Intervention(s)  Therapist will assign homework of using coping skills to increase executive functioning skills.    Objective #D  Patient will create and implement daily and weekly routines.   Status: Continued - Date: 5/8/2025     Intervention(s)  Therapist will teach about how routines help support ADHD and assign homework to create and implement routines.       Goal 2: Client will work on stabilizing anxiety symptoms as evidenced by LOGAN-7 scores (current is 13) and Self report.  Goal is to reduce by five points.      I will know I've met my goal when I'm not over thinking things as much.      Objective #A Client will identify triggers of anxiety and process them in session.    Status: Continued - Date: 5/8/2025    Intervention(s)  Therapist will teach emotional recognition/identification by assigning homework to journal with written exercises.    Objective #B  Client will identify initial signs or symptoms of anxiety.    Status: Continued - Date: 5/8/2025    Intervention(s)  Therapist will teach emotional regulation skills, such as deep breathing and mindfulness skills.    Objective #C  Client will learn 5 new coping skills to reduce symptoms anxiety.   Status: Continued - Date:  5/8/2025    Intervention(s)  Therapist will provide educational materials on distress tolerance skills and other copings skills.    Objective #D  Client will learn about how ADHD and anxiety are co-morbid.   Status: Continued - Date: 5/8/2025    Intervention(s)  Therapist will provide educational materials on how ADHD and anxiety disorders can impact each other.      Goal 3: Client will work on stabilizing depressive symptoms as evidenced by PHQ scores (current is 4) and Self report.  Goal is to maintain a score under 5 points.     I will know I've met my goal when my self-confidence increases.      Objective #A    Client will bring to session stressors since last visit to process and formulate coping mechanisms.  Status: Continued - Date: 5/8/2025    Intervention(s)  Therapist will provide educational materials on distress tolerance skills and other copings skills.     Intervention(s)  Therapist will bring to each session thought patterns that contribute to stress and depression, and develop cognitive restructuring techniques to help manage these thought distortions.     Objective #B  Client will incorporate self-care in everyday life to manage physical and mental health.   Status: Continued - Date: 5/8/2025    Intervention(s)  Therapist will process through with success and failures related to self-care activities.    Objective #C  Client will reduce feeling bad about herself by being more aware of cognitive distortions.  Status: Continued - Date: 5/8/2025    Intervention(s)  Therapist will teach about cognitive distortions, specifically patterns, and look at ways to be more aware of thoughts.        Patient has reviewed and agreed to the above plan.      SEAN Haines  January 24, 2025 Reviewed 5/8/2025

## 2025-06-10 ENCOUNTER — VIRTUAL VISIT (OUTPATIENT)
Dept: PSYCHOLOGY | Facility: CLINIC | Age: 35
End: 2025-06-10
Payer: COMMERCIAL

## 2025-06-10 DIAGNOSIS — F41.9 ANXIETY DISORDER, UNSPECIFIED TYPE: ICD-10-CM

## 2025-06-10 DIAGNOSIS — F90.0 ATTENTION DEFICIT HYPERACTIVITY DISORDER (ADHD), PREDOMINANTLY INATTENTIVE TYPE: ICD-10-CM

## 2025-06-10 DIAGNOSIS — F33.1 MODERATE EPISODE OF RECURRENT MAJOR DEPRESSIVE DISORDER (H): Primary | ICD-10-CM

## 2025-06-10 PROCEDURE — 90837 PSYTX W PT 60 MINUTES: CPT | Mod: 95

## 2025-06-10 NOTE — PROGRESS NOTES
[facility]                                     Progress Note    Patient Name: Tracey Crawford  Date: 6/10/2025         Service Type: Individual      Session Start Time: 1:01 PM  Session End Time: 1:59 PM     Session Length: 53+ min    Session #: 17    Attendees: Client attended alone    Service Modality:  Video Visit:      Provider verified identity through the following two step process.  Patient provided:  Patient is known previously to provider    Telemedicine Visit: The patient's condition can be safely assessed and treated via synchronous audio and visual telemedicine encounter.      Reason for Telemedicine Visit: Patient has requested telehealth visit    Originating Site (Patient Location): Patient's home    Distant Site (Provider Location): Provider Remote Setting- Home Office    Consent:  The patient/guardian has verbally consented to: the potential risks and benefits of telemedicine (video visit) versus in person care; bill my insurance or make self-payment for services provided; and responsibility for payment of non-covered services.     Patient would like the video invitation sent by:  My Chart    Mode of Communication:  Video Conference via Amwell    Distant Location (Provider):  Off-site    As the provider I attest to compliance with applicable laws and regulations related to telemedicine.    DATA  Extended Session (53+ minutes): PROLONGED SERVICE IN THE OUTPATIENT SETTING REQUIRING DIRECT (FACE-TO-FACE) PATIENT CONTACT BEYOND THE USUAL SERVICE:    - Patient's presenting concerns require more intensive intervention than could be completed within the usual service  Interactive Complexity: No  Crisis: No        Progress Since Last Session (Related to Symptoms / Goals / Homework):   Symptoms: No change based on patient self-report.     Homework: Partially completed      Episode of Care Goals: Satisfactory progress - PREPARATION (Decided to change - considering how); Intervened by  "negotiating a change plan and determining options / strategies for behavior change, identifying triggers, exploring social supports, and working towards setting a date to begin behavior change     Current / Ongoing Stressors and Concerns:   Patient is a parent and a wife. Patient is currently unemployed. Patient was diagnosed with ADHD in childhood. Patient reports concerns about executive functioning tasks. Patient reports concerns about emotion regulation. Patient reports concerns about routines. Patient reports concerns about sensory sensitivities. Patient reports concerns about self-judgments. Patient reports concerns about perfectionism. Patient reports concerns about finances.      Treatment Objective(s) Addressed in This Session:   Patient will learn about the diagnosis and symptoms of ADHD.   Patient will learn 5 skills to help increase executive functioning skills.   Client will identify triggers of anxiety and process them in session.    Patient will create and implement daily and weekly routines.      Intervention:   Motivational Interviewing: supported patient in processing and exploring finances as a stressor.  Validated patient's emotions. Supported patient in processing and exploring feeling overwhelmed with \"everything\". Supported patient in processing and exploring what has helped reduce this emotion in the past. Reviewed how ADHD impacts processing. Discussed journaling her thoughts and emotions to put them into a visual format. Reviewed practicing validating her own emotions. Supported patient in processing and exploring how patient has been able to validate her own emotions. Validated patient's use of skills.     Assessments completed prior to visit:  The following assessments were completed by patient for this visit:  PHQ9:       1/24/2025     9:31 AM 1/28/2025     9:53 PM 2/21/2025     8:00 AM 3/20/2025    12:00 PM 4/24/2025     2:00 PM 5/8/2025     1:00 PM 6/3/2025     1:00 PM   PHQ-9 SCORE "   PHQ-9 Total Score MyChart 5 (Mild depression) 0        PHQ-9 Total Score 5  0  8 8 4 6 7       Patient-reported     GAD7:       12/2/2024     2:04 PM 1/24/2025     9:33 AM 2/21/2025     8:00 AM 3/20/2025    12:00 PM 4/24/2025     2:00 PM 5/8/2025     1:00 PM 6/3/2025     2:00 PM   LOGAN-7 SCORE   Total Score 14 (moderate anxiety) 9 (mild anxiety)        Total Score 14  9  12 13 10 13 9       Patient-reported     PROMIS 10-Global Health (only subscores and total score):       12/2/2024     2:06 PM 1/24/2025    10:03 AM 5/8/2025     1:59 PM   PROMIS-10 Scores Only   Global Mental Health Score 7  9 13   Global Physical Health Score 14  16 18   PROMIS TOTAL - SUBSCORES 21  25 31       Patient-reported         ASSESSMENT: Current Emotional / Mental Status (status of significant symptoms):   Risk status (Self / Other harm or suicidal ideation)   Patient denies current fears or concerns for personal safety.   Patient denies current or recent suicidal ideation or behaviors.   Patient denies current or recent homicidal ideation or behaviors.   Patient denies current or recent self injurious behavior or ideation.   Patient denies other safety concerns.   Patient reports there has been no change in risk factors since their last session.     Patient reports there has been no change in protective factors since their last session.     Recommended that patient call 911 or go to the local ED should there be a change in any of these risk factors     Appearance:   Appropriate    Eye Contact:   Good    Psychomotor Behavior: Normal    Attitude:   Cooperative    Orientation:   All   Speech    Rate / Production: Normal     Volume:  Normal    Mood:    Anxious  Depressed  Sad    Affect:    Tearful Worrisome    Thought Content:  Rumination    Thought Form:  Coherent    Insight:    Good      Medication Review:   No changes to current psychiatric medication(s)     Medication Compliance:   Yes     Changes in Health Issues:   None  reported     Chemical Use Review:   Substance Use: Chemical use reviewed, no active concerns identified      Tobacco Use: No current tobacco use.      Diagnosis:  1. Moderate episode of recurrent major depressive disorder (H)    2. Attention deficit hyperactivity disorder (ADHD), predominantly inattentive type    3. Anxiety disorder, unspecified type        Collateral Reports Completed:   Not Applicable    PLAN: (Patient Tasks / Therapist Tasks / Other)  Patient will practice validating her own emotions. Patient will journal about her thoughts and feelings and be mindful of how this impacts the emotions of being overwhelmed.   We will discuss next session.       Virginie Rosado, [facility] 6/10/2025    __________________________________________________________________    Individual Treatment Plan    Patient's Name: Tracey Crawford  YOB: 1990    Date of Creation: 1/24/2025  Date Treatment Plan Last Reviewed/Revised: 5/8/2025  DSM5 Diagnoses: Attention-Deficit/Hyperactivity Disorder  314.00 (F90.0) Predominantly inattentive presentation, 296.32 (F33.1) Major Depressive Disorder, Recurrent Episode, Moderate _, or 300.09 (F41.8) Other Specified Anxiety Disorder   Psychosocial / Contextual Factors: Patient is a parent and a wife. Patient is currently unemployed.  PROMIS (reviewed every 90 days): The following assessments were completed by patient for this visit:  PROMIS 10-Global Health (only subscores and total score):       12/2/2024     2:06 PM 1/24/2025    10:03 AM 5/8/2025     1:59 PM   PROMIS-10 Scores Only   Global Mental Health Score 7  9 13   Global Physical Health Score 14  16 18   PROMIS TOTAL - SUBSCORES 21  25 31       Patient-reported       Referral / Collaboration:  Referral to another professional/service is not indicated at this time..    Anticipated number of session for this episode of care: 75  Anticipation frequency of session: Every other week  Anticipated Duration of each session: 53 or more  minutes  Treatment plan will be reviewed in 90 days or when goals have been changed.       MeasurableTreatment Goal(s) related to diagnosis / functional impairment(s)  Goal 1:  Patient will work on stabilizing symptoms of ADHD.    I will know I've met my goal when visually my house will be  and being able to keep up on this and when I'm late less days.       Objective #A  Patient will learn about the diagnosis and symptoms of ADHD.   Status: Continued - Date: 5/8/2025     Intervention(s)  Therapist will teach about the diagnosis of ADHD.     Objective #B  Patient will use daily planner 50% of the time.                         Status: Continued - Date: 5/8/2025     Intervention(s)  Therapist will teach organizational skills.     Objective #C  Patient will learn 5 skills to help increase executive functioning skills.   Status: Continued - Date: 5/8/2025    Intervention(s)  Therapist will assign homework of using coping skills to increase executive functioning skills.    Objective #D  Patient will create and implement daily and weekly routines.   Status: Continued - Date: 5/8/2025     Intervention(s)  Therapist will teach about how routines help support ADHD and assign homework to create and implement routines.       Goal 2: Client will work on stabilizing anxiety symptoms as evidenced by LOGAN-7 scores (current is 13) and Self report.  Goal is to reduce by five points.      I will know I've met my goal when I'm not over thinking things as much.      Objective #A Client will identify triggers of anxiety and process them in session.    Status: Continued - Date: 5/8/2025    Intervention(s)  Therapist will teach emotional recognition/identification by assigning homework to journal with written exercises.    Objective #B  Client will identify initial signs or symptoms of anxiety.    Status: Continued - Date: 5/8/2025    Intervention(s)  Therapist will teach emotional regulation skills, such as deep breathing and  mindfulness skills.    Objective #C  Client will learn 5 new coping skills to reduce symptoms anxiety.   Status: Continued - Date: 5/8/2025    Intervention(s)  Therapist will provide educational materials on distress tolerance skills and other copings skills.    Objective #D  Client will learn about how ADHD and anxiety are co-morbid.   Status: Continued - Date: 5/8/2025    Intervention(s)  Therapist will provide educational materials on how ADHD and anxiety disorders can impact each other.      Goal 3: Client will work on stabilizing depressive symptoms as evidenced by PHQ scores (current is 4) and Self report.  Goal is to maintain a score under 5 points.     I will know I've met my goal when my self-confidence increases.      Objective #A    Client will bring to session stressors since last visit to process and formulate coping mechanisms.  Status: Continued - Date: 5/8/2025    Intervention(s)  Therapist will provide educational materials on distress tolerance skills and other copings skills.     Intervention(s)  Therapist will bring to each session thought patterns that contribute to stress and depression, and develop cognitive restructuring techniques to help manage these thought distortions.     Objective #B  Client will incorporate self-care in everyday life to manage physical and mental health.   Status: Continued - Date: 5/8/2025    Intervention(s)  Therapist will process through with success and failures related to self-care activities.    Objective #C  Client will reduce feeling bad about herself by being more aware of cognitive distortions.  Status: Continued - Date: 5/8/2025    Intervention(s)  Therapist will teach about cognitive distortions, specifically patterns, and look at ways to be more aware of thoughts.        Patient has reviewed and agreed to the above plan.      SEAN Haines  January 24, 2025 Reviewed 5/8/2025

## 2025-06-16 ENCOUNTER — VIRTUAL VISIT (OUTPATIENT)
Dept: PSYCHOLOGY | Facility: CLINIC | Age: 35
End: 2025-06-16
Payer: COMMERCIAL

## 2025-06-16 DIAGNOSIS — F33.1 MODERATE EPISODE OF RECURRENT MAJOR DEPRESSIVE DISORDER (H): Primary | ICD-10-CM

## 2025-06-16 DIAGNOSIS — F90.0 ATTENTION DEFICIT HYPERACTIVITY DISORDER (ADHD), PREDOMINANTLY INATTENTIVE TYPE: ICD-10-CM

## 2025-06-16 DIAGNOSIS — F41.9 ANXIETY DISORDER, UNSPECIFIED TYPE: ICD-10-CM

## 2025-06-16 PROCEDURE — 90837 PSYTX W PT 60 MINUTES: CPT | Mod: 95

## 2025-06-16 ASSESSMENT — ANXIETY QUESTIONNAIRES
6. BECOMING EASILY ANNOYED OR IRRITABLE: NEARLY EVERY DAY
5. BEING SO RESTLESS THAT IT IS HARD TO SIT STILL: SEVERAL DAYS
IF YOU CHECKED OFF ANY PROBLEMS ON THIS QUESTIONNAIRE, HOW DIFFICULT HAVE THESE PROBLEMS MADE IT FOR YOU TO DO YOUR WORK, TAKE CARE OF THINGS AT HOME, OR GET ALONG WITH OTHER PEOPLE: VERY DIFFICULT
GAD7 TOTAL SCORE: 12
1. FEELING NERVOUS, ANXIOUS, OR ON EDGE: MORE THAN HALF THE DAYS
8. IF YOU CHECKED OFF ANY PROBLEMS, HOW DIFFICULT HAVE THESE MADE IT FOR YOU TO DO YOUR WORK, TAKE CARE OF THINGS AT HOME, OR GET ALONG WITH OTHER PEOPLE?: VERY DIFFICULT
7. FEELING AFRAID AS IF SOMETHING AWFUL MIGHT HAPPEN: SEVERAL DAYS
4. TROUBLE RELAXING: SEVERAL DAYS
3. WORRYING TOO MUCH ABOUT DIFFERENT THINGS: MORE THAN HALF THE DAYS
2. NOT BEING ABLE TO STOP OR CONTROL WORRYING: MORE THAN HALF THE DAYS
GAD7 TOTAL SCORE: 12
7. FEELING AFRAID AS IF SOMETHING AWFUL MIGHT HAPPEN: SEVERAL DAYS
GAD7 TOTAL SCORE: 12

## 2025-06-16 ASSESSMENT — PATIENT HEALTH QUESTIONNAIRE - PHQ9
SUM OF ALL RESPONSES TO PHQ QUESTIONS 1-9: 8
SUM OF ALL RESPONSES TO PHQ QUESTIONS 1-9: 8
10. IF YOU CHECKED OFF ANY PROBLEMS, HOW DIFFICULT HAVE THESE PROBLEMS MADE IT FOR YOU TO DO YOUR WORK, TAKE CARE OF THINGS AT HOME, OR GET ALONG WITH OTHER PEOPLE: VERY DIFFICULT

## 2025-06-16 NOTE — PROGRESS NOTES
[facility]                                     Progress Note    Patient Name: Tracey Crawford  Date: 6/16/2025         Service Type: Individual      Session Start Time: 12:01 PM  Session End Time: 12:56 PM     Session Length: 53+ min    Session #: 18    Attendees: Client attended alone    Service Modality:  Video Visit:      Provider verified identity through the following two step process.  Patient provided:  Patient is known previously to provider    Telemedicine Visit: The patient's condition can be safely assessed and treated via synchronous audio and visual telemedicine encounter.      Reason for Telemedicine Visit: Patient has requested telehealth visit    Originating Site (Patient Location): Patient's home    Distant Site (Provider Location): Provider Remote Setting- Home Office    Consent:  The patient/guardian has verbally consented to: the potential risks and benefits of telemedicine (video visit) versus in person care; bill my insurance or make self-payment for services provided; and responsibility for payment of non-covered services.     Patient would like the video invitation sent by:  My Chart    Mode of Communication:  Video Conference via Amwell    Distant Location (Provider):  Off-site    As the provider I attest to compliance with applicable laws and regulations related to telemedicine.    DATA  Extended Session (53+ minutes): PROLONGED SERVICE IN THE OUTPATIENT SETTING REQUIRING DIRECT (FACE-TO-FACE) PATIENT CONTACT BEYOND THE USUAL SERVICE:    - Patient's presenting concerns require more intensive intervention than could be completed within the usual service  Interactive Complexity: No  Crisis: No        Progress Since Last Session (Related to Symptoms / Goals / Homework):   Symptoms: Worsening based on PHQ-9 and LOGAN-7 scores and patient self-report.     Homework: Partially completed      Episode of Care Goals: Satisfactory progress - ACTION (Actively working towards change);  Intervened by reinforcing change plan / affirming steps taken     Current / Ongoing Stressors and Concerns:   Patient is a parent and a wife. Patient is currently unemployed. Patient was diagnosed with ADHD in childhood. Patient reports concerns about executive functioning tasks. Patient reports concerns about emotion regulation. Patient reports concerns about routines. Patient reports concerns about sensory sensitivities. Patient reports concerns about self-judgments. Patient reports concerns about perfectionism. Patient reports concerns about finances. Patient reports concerns about an internal conflict.      Treatment Objective(s) Addressed in This Session:   Patient will learn about the diagnosis and symptoms of ADHD.   Patient will learn 5 skills to help increase executive functioning skills.   Client will identify triggers of anxiety and process them in session.    Patient will create and implement daily and weekly routines.      Intervention:   Motivational Interviewing: supported patient in processing and exploring an internal conflict.  Validated patient's emotions. Supported patient in processing and exploring what she would like to talk to her cousin about. Supported patient in processing and exploring asking for a weekly schedule for the summer with her cousin. Discussed identifying the schedule she would like to implement. Reviewed effective communication skills and role played a conversation between patient and her cousin where patient uses her skills to be effective. Supported patient in processing and exploring how people pleasing tendencies are present in this conflict. Reviewed practicing validating her own emotions. Supported patient in identifying her emotions in this conflict.      Assessments completed prior to visit:  The following assessments were completed by patient for this visit:  PHQ9:       1/28/2025     9:53 PM 2/21/2025     8:00 AM 3/20/2025    12:00 PM 4/24/2025     2:00 PM 5/8/2025      1:00 PM 6/3/2025     1:00 PM 6/16/2025     6:29 AM   PHQ-9 SCORE   PHQ-9 Total Score MyChart 0      8 (Mild depression)   PHQ-9 Total Score 0  8 8 4 6 7 8        Patient-reported     GAD7:       1/24/2025     9:33 AM 2/21/2025     8:00 AM 3/20/2025    12:00 PM 4/24/2025     2:00 PM 5/8/2025     1:00 PM 6/3/2025     2:00 PM 6/16/2025     6:31 AM   LOGAN-7 SCORE   Total Score 9 (mild anxiety)      12 (moderate anxiety)   Total Score 9  12 13 10 13 9 12        Patient-reported     PROMIS 10-Global Health (only subscores and total score):       12/2/2024     2:06 PM 1/24/2025    10:03 AM 5/8/2025     1:59 PM   PROMIS-10 Scores Only   Global Mental Health Score 7  9 13   Global Physical Health Score 14  16 18   PROMIS TOTAL - SUBSCORES 21  25 31       Patient-reported         ASSESSMENT: Current Emotional / Mental Status (status of significant symptoms):   Risk status (Self / Other harm or suicidal ideation)   Patient denies current fears or concerns for personal safety.   Patient denies current or recent suicidal ideation or behaviors.   Patient denies current or recent homicidal ideation or behaviors.   Patient denies current or recent self injurious behavior or ideation.   Patient denies other safety concerns.   Patient reports there has been no change in risk factors since their last session.     Patient reports there has been no change in protective factors since their last session.     Recommended that patient call 911 or go to the local ED should there be a change in any of these risk factors     Appearance:   Appropriate    Eye Contact:   Good    Psychomotor Behavior: Normal    Attitude:   Cooperative    Orientation:   All   Speech    Rate / Production: Normal     Volume:  Normal    Mood:    Anxious  Depressed    Affect:    Worrisome    Thought Content:  Rumination    Thought Form:  Coherent    Insight:    Good      Medication Review:   No changes to current psychiatric medication(s)     Medication  Compliance:   Yes     Changes in Health Issues:   None reported     Chemical Use Review:   Substance Use: Chemical use reviewed, no active concerns identified      Tobacco Use: No current tobacco use.      Diagnosis:  1. Moderate episode of recurrent major depressive disorder (H)    2. Attention deficit hyperactivity disorder (ADHD), predominantly inattentive type    3. Anxiety disorder, unspecified type        Collateral Reports Completed:   Not Applicable    PLAN: (Patient Tasks / Therapist Tasks / Other)  Patient will practice validating her own emotions. Patient will practice using her planner on a daily basis.  Patient will identify a weekly routine and use effective communication skills to talk to her cousin about this. Patient will be mindful of how effective this is. We will discuss next session.       Virginie Rosado, [facility] 6/16/2025    __________________________________________________________________    Individual Treatment Plan    Patient's Name: Tracey Crawford  YOB: 1990    Date of Creation: 1/24/2025  Date Treatment Plan Last Reviewed/Revised: 5/8/2025  DSM5 Diagnoses: Attention-Deficit/Hyperactivity Disorder  314.00 (F90.0) Predominantly inattentive presentation, 296.32 (F33.1) Major Depressive Disorder, Recurrent Episode, Moderate _, or 300.09 (F41.8) Other Specified Anxiety Disorder   Psychosocial / Contextual Factors: Patient is a parent and a wife. Patient is currently unemployed.  PROMIS (reviewed every 90 days): The following assessments were completed by patient for this visit:  PROMIS 10-Global Health (only subscores and total score):       12/2/2024     2:06 PM 1/24/2025    10:03 AM 5/8/2025     1:59 PM   PROMIS-10 Scores Only   Global Mental Health Score 7  9 13   Global Physical Health Score 14  16 18   PROMIS TOTAL - SUBSCORES 21  25 31       Patient-reported       Referral / Collaboration:  Referral to another professional/service is not indicated at this time..    Anticipated  number of session for this episode of care: 75  Anticipation frequency of session: Every other week  Anticipated Duration of each session: 53 or more minutes  Treatment plan will be reviewed in 90 days or when goals have been changed.       MeasurableTreatment Goal(s) related to diagnosis / functional impairment(s)  Goal 1:  Patient will work on stabilizing symptoms of ADHD.    I will know I've met my goal when visually my house will be  and being able to keep up on this and when I'm late less days.       Objective #A  Patient will learn about the diagnosis and symptoms of ADHD.   Status: Continued - Date: 5/8/2025     Intervention(s)  Therapist will teach about the diagnosis of ADHD.     Objective #B  Patient will use daily planner 50% of the time.                         Status: Continued - Date: 5/8/2025     Intervention(s)  Therapist will teach organizational skills.     Objective #C  Patient will learn 5 skills to help increase executive functioning skills.   Status: Continued - Date: 5/8/2025    Intervention(s)  Therapist will assign homework of using coping skills to increase executive functioning skills.    Objective #D  Patient will create and implement daily and weekly routines.   Status: Continued - Date: 5/8/2025     Intervention(s)  Therapist will teach about how routines help support ADHD and assign homework to create and implement routines.       Goal 2: Client will work on stabilizing anxiety symptoms as evidenced by LOGAN-7 scores (current is 13) and Self report.  Goal is to reduce by five points.      I will know I've met my goal when I'm not over thinking things as much.      Objective #A Client will identify triggers of anxiety and process them in session.    Status: Continued - Date: 5/8/2025    Intervention(s)  Therapist will teach emotional recognition/identification by assigning homework to journal with written exercises.    Objective #B  Client will identify initial signs or symptoms of  anxiety.    Status: Continued - Date: 5/8/2025    Intervention(s)  Therapist will teach emotional regulation skills, such as deep breathing and mindfulness skills.    Objective #C  Client will learn 5 new coping skills to reduce symptoms anxiety.   Status: Continued - Date: 5/8/2025    Intervention(s)  Therapist will provide educational materials on distress tolerance skills and other copings skills.    Objective #D  Client will learn about how ADHD and anxiety are co-morbid.   Status: Continued - Date: 5/8/2025    Intervention(s)  Therapist will provide educational materials on how ADHD and anxiety disorders can impact each other.      Goal 3: Client will work on stabilizing depressive symptoms as evidenced by PHQ scores (current is 4) and Self report.  Goal is to maintain a score under 5 points.     I will know I've met my goal when my self-confidence increases.      Objective #A    Client will bring to session stressors since last visit to process and formulate coping mechanisms.  Status: Continued - Date: 5/8/2025    Intervention(s)  Therapist will provide educational materials on distress tolerance skills and other copings skills.     Intervention(s)  Therapist will bring to each session thought patterns that contribute to stress and depression, and develop cognitive restructuring techniques to help manage these thought distortions.     Objective #B  Client will incorporate self-care in everyday life to manage physical and mental health.   Status: Continued - Date: 5/8/2025    Intervention(s)  Therapist will process through with success and failures related to self-care activities.    Objective #C  Client will reduce feeling bad about herself by being more aware of cognitive distortions.  Status: Continued - Date: 5/8/2025    Intervention(s)  Therapist will teach about cognitive distortions, specifically patterns, and look at ways to be more aware of thoughts.        Patient has reviewed and agreed to the above  plan.      Virginie Rosado, [facility]  January 24, 2025 Reviewed 5/8/2025

## 2025-06-25 ENCOUNTER — MYC REFILL (OUTPATIENT)
Dept: FAMILY MEDICINE | Facility: CLINIC | Age: 35
End: 2025-06-25
Payer: COMMERCIAL

## 2025-06-25 ENCOUNTER — VIRTUAL VISIT (OUTPATIENT)
Dept: PSYCHOLOGY | Facility: CLINIC | Age: 35
End: 2025-06-25
Payer: COMMERCIAL

## 2025-06-25 DIAGNOSIS — F41.9 ANXIETY DISORDER, UNSPECIFIED TYPE: ICD-10-CM

## 2025-06-25 DIAGNOSIS — F98.8 ATTENTION DEFICIT DISORDER (ADD) WITHOUT HYPERACTIVITY: ICD-10-CM

## 2025-06-25 DIAGNOSIS — F33.1 MODERATE EPISODE OF RECURRENT MAJOR DEPRESSIVE DISORDER (H): Primary | ICD-10-CM

## 2025-06-25 DIAGNOSIS — F90.0 ATTENTION DEFICIT HYPERACTIVITY DISORDER (ADHD), PREDOMINANTLY INATTENTIVE TYPE: ICD-10-CM

## 2025-06-25 PROCEDURE — 90837 PSYTX W PT 60 MINUTES: CPT | Mod: 95

## 2025-06-25 RX ORDER — DEXTROAMPHETAMINE SACCHARATE, AMPHETAMINE ASPARTATE, DEXTROAMPHETAMINE SULFATE AND AMPHETAMINE SULFATE 2.5; 2.5; 2.5; 2.5 MG/1; MG/1; MG/1; MG/1
10 TABLET ORAL 2 TIMES DAILY
Qty: 180 TABLET | Refills: 0 | Status: SHIPPED | OUTPATIENT
Start: 2025-06-25

## 2025-06-25 NOTE — PROGRESS NOTES
[facility]                                     Progress Note    Patient Name: Tracey Crawford  Date: 6/25/2025         Service Type: Individual      Session Start Time: 1:00 PM  Session End Time: 1:57 PM     Session Length: 53+ min    Session #: 19    Attendees: Client attended alone    Service Modality:  Video Visit:      Provider verified identity through the following two step process.  Patient provided:  Patient is known previously to provider    Telemedicine Visit: The patient's condition can be safely assessed and treated via synchronous audio and visual telemedicine encounter.      Reason for Telemedicine Visit: Patient has requested telehealth visit    Originating Site (Patient Location): Patient's home    Distant Site (Provider Location): Provider Remote Setting- Home Office    Consent:  The patient/guardian has verbally consented to: the potential risks and benefits of telemedicine (video visit) versus in person care; bill my insurance or make self-payment for services provided; and responsibility for payment of non-covered services.     Patient would like the video invitation sent by:  My Chart    Mode of Communication:  Video Conference via Amwell    Distant Location (Provider):  Off-site    As the provider I attest to compliance with applicable laws and regulations related to telemedicine.    DATA  Extended Session (53+ minutes): PROLONGED SERVICE IN THE OUTPATIENT SETTING REQUIRING DIRECT (FACE-TO-FACE) PATIENT CONTACT BEYOND THE USUAL SERVICE:    - Patient's presenting concerns require more intensive intervention than could be completed within the usual service  Interactive Complexity: No  Crisis: No        Progress Since Last Session (Related to Symptoms / Goals / Homework):   Symptoms: No change based on patient self-report.     Homework: Partially completed      Episode of Care Goals: Satisfactory progress - ACTION (Actively working towards change); Intervened by reinforcing  change plan / affirming steps taken     Current / Ongoing Stressors and Concerns:   Patient is a parent and a wife. Patient is currently unemployed. Patient was diagnosed with ADHD in childhood. Patient reports concerns about executive functioning tasks. Patient reports concerns about emotion regulation. Patient reports concerns about routines. Patient reports concerns about sensory sensitivities. Patient reports concerns about self-judgments. Patient reports concerns about perfectionism. Patient reports concerns about finances. Patient reports concerns about an internal conflict.      Treatment Objective(s) Addressed in This Session:   Patient will learn about the diagnosis and symptoms of ADHD.   Patient will learn 5 skills to help increase executive functioning skills.   Client will identify triggers of anxiety and process them in session.    Patient will create and implement daily and weekly routines.      Intervention:   Motivational Interviewing: supported patient in processing and exploring an internal conflict.  Validated patient's emotions. Supported patient in processing and exploring feeling responsible for other's emotions. Discussed being mindful of when she is feeling responsible for her mom's emotions. Supported patient in identifying and exploring what she fears will happen if she lets of this responsibility. Supported patient in identifying and exploring boundaries she would like to set with her mom. Reviewed effective communication skills and how to set and maintain boundaries. Role played a conversation between patient and her mom where patient uses her skills to be effective. Discussed being mindful of how effective this is.     Assessments completed prior to visit:  The following assessments were completed by patient for this visit:  PHQ9:       1/28/2025     9:53 PM 2/21/2025     8:00 AM 3/20/2025    12:00 PM 4/24/2025     2:00 PM 5/8/2025     1:00 PM 6/3/2025     1:00 PM 6/16/2025     6:29 AM    PHQ-9 SCORE   PHQ-9 Total Score Ingrishart 0      8 (Mild depression)   PHQ-9 Total Score 0  8 8 4 6 7 8        Patient-reported     GAD7:       1/24/2025     9:33 AM 2/21/2025     8:00 AM 3/20/2025    12:00 PM 4/24/2025     2:00 PM 5/8/2025     1:00 PM 6/3/2025     2:00 PM 6/16/2025     6:31 AM   LOGAN-7 SCORE   Total Score 9 (mild anxiety)      12 (moderate anxiety)   Total Score 9  12 13 10 13 9 12        Patient-reported     PROMIS 10-Global Health (only subscores and total score):       12/2/2024     2:06 PM 1/24/2025    10:03 AM 5/8/2025     1:59 PM   PROMIS-10 Scores Only   Global Mental Health Score 7  9 13   Global Physical Health Score 14  16 18   PROMIS TOTAL - SUBSCORES 21  25 31       Patient-reported         ASSESSMENT: Current Emotional / Mental Status (status of significant symptoms):   Risk status (Self / Other harm or suicidal ideation)   Patient denies current fears or concerns for personal safety.   Patient denies current or recent suicidal ideation or behaviors.   Patient denies current or recent homicidal ideation or behaviors.   Patient denies current or recent self injurious behavior or ideation.   Patient denies other safety concerns.   Patient reports there has been no change in risk factors since their last session.     Patient reports there has been no change in protective factors since their last session.     Recommended that patient call 911 or go to the local ED should there be a change in any of these risk factors     Appearance:   Appropriate    Eye Contact:   Good    Psychomotor Behavior: Normal    Attitude:   Cooperative    Orientation:   All   Speech    Rate / Production: Normal     Volume:  Normal    Mood:    Anxious  Depressed  Sad    Affect:    Worrisome    Thought Content:  Rumination    Thought Form:  Coherent    Insight:    Good      Medication Review:   No changes to current psychiatric medication(s)     Medication Compliance:   Yes     Changes in Health Issues:   None  reported     Chemical Use Review:   Substance Use: Chemical use reviewed, no active concerns identified      Tobacco Use: No current tobacco use.      Diagnosis:  1. Moderate episode of recurrent major depressive disorder (H)    2. Attention deficit hyperactivity disorder (ADHD), predominantly inattentive type    3. Anxiety disorder, unspecified type        Collateral Reports Completed:   Not Applicable    PLAN: (Patient Tasks / Therapist Tasks / Other)  Patient will practice validating her own emotions.  Patient will practice using effective communication skills to set and maintain boundaries with her mom. Patient will be mindful of how often she feels responsible for other's emotions. We will discuss next session.       Virginie Rosado, [facility] 6/25/2025    __________________________________________________________________    Individual Treatment Plan    Patient's Name: Tracey Crawford  YOB: 1990    Date of Creation: 1/24/2025  Date Treatment Plan Last Reviewed/Revised: 5/8/2025  DSM5 Diagnoses: Attention-Deficit/Hyperactivity Disorder  314.00 (F90.0) Predominantly inattentive presentation, 296.32 (F33.1) Major Depressive Disorder, Recurrent Episode, Moderate _, or 300.09 (F41.8) Other Specified Anxiety Disorder   Psychosocial / Contextual Factors: Patient is a parent and a wife. Patient is currently unemployed.  PROMIS (reviewed every 90 days): The following assessments were completed by patient for this visit:  PROMIS 10-Global Health (only subscores and total score):       12/2/2024     2:06 PM 1/24/2025    10:03 AM 5/8/2025     1:59 PM   PROMIS-10 Scores Only   Global Mental Health Score 7  9 13   Global Physical Health Score 14  16 18   PROMIS TOTAL - SUBSCORES 21  25 31       Patient-reported       Referral / Collaboration:  Referral to another professional/service is not indicated at this time..    Anticipated number of session for this episode of care: 75  Anticipation frequency of session: Every  other week  Anticipated Duration of each session: 53 or more minutes  Treatment plan will be reviewed in 90 days or when goals have been changed.       MeasurableTreatment Goal(s) related to diagnosis / functional impairment(s)  Goal 1:  Patient will work on stabilizing symptoms of ADHD.    I will know I've met my goal when visually my house will be  and being able to keep up on this and when I'm late less days.       Objective #A  Patient will learn about the diagnosis and symptoms of ADHD.   Status: Continued - Date: 5/8/2025     Intervention(s)  Therapist will teach about the diagnosis of ADHD.     Objective #B  Patient will use daily planner 50% of the time.                         Status: Continued - Date: 5/8/2025     Intervention(s)  Therapist will teach organizational skills.     Objective #C  Patient will learn 5 skills to help increase executive functioning skills.   Status: Continued - Date: 5/8/2025    Intervention(s)  Therapist will assign homework of using coping skills to increase executive functioning skills.    Objective #D  Patient will create and implement daily and weekly routines.   Status: Continued - Date: 5/8/2025     Intervention(s)  Therapist will teach about how routines help support ADHD and assign homework to create and implement routines.       Goal 2: Client will work on stabilizing anxiety symptoms as evidenced by LOGAN-7 scores (current is 13) and Self report.  Goal is to reduce by five points.      I will know I've met my goal when I'm not over thinking things as much.      Objective #A Client will identify triggers of anxiety and process them in session.    Status: Continued - Date: 5/8/2025    Intervention(s)  Therapist will teach emotional recognition/identification by assigning homework to journal with written exercises.    Objective #B  Client will identify initial signs or symptoms of anxiety.    Status: Continued - Date: 5/8/2025    Intervention(s)  Therapist will teach  emotional regulation skills, such as deep breathing and mindfulness skills.    Objective #C  Client will learn 5 new coping skills to reduce symptoms anxiety.   Status: Continued - Date: 5/8/2025    Intervention(s)  Therapist will provide educational materials on distress tolerance skills and other copings skills.    Objective #D  Client will learn about how ADHD and anxiety are co-morbid.   Status: Continued - Date: 5/8/2025    Intervention(s)  Therapist will provide educational materials on how ADHD and anxiety disorders can impact each other.      Goal 3: Client will work on stabilizing depressive symptoms as evidenced by PHQ scores (current is 4) and Self report.  Goal is to maintain a score under 5 points.     I will know I've met my goal when my self-confidence increases.      Objective #A    Client will bring to session stressors since last visit to process and formulate coping mechanisms.  Status: Continued - Date: 5/8/2025    Intervention(s)  Therapist will provide educational materials on distress tolerance skills and other copings skills.     Intervention(s)  Therapist will bring to each session thought patterns that contribute to stress and depression, and develop cognitive restructuring techniques to help manage these thought distortions.     Objective #B  Client will incorporate self-care in everyday life to manage physical and mental health.   Status: Continued - Date: 5/8/2025    Intervention(s)  Therapist will process through with success and failures related to self-care activities.    Objective #C  Client will reduce feeling bad about herself by being more aware of cognitive distortions.  Status: Continued - Date: 5/8/2025    Intervention(s)  Therapist will teach about cognitive distortions, specifically patterns, and look at ways to be more aware of thoughts.        Patient has reviewed and agreed to the above plan.      Virginie Rosado [facility]  January 24, 2025 Reviewed 5/8/2025

## 2025-06-30 ENCOUNTER — VIRTUAL VISIT (OUTPATIENT)
Dept: PSYCHOLOGY | Facility: CLINIC | Age: 35
End: 2025-06-30
Payer: COMMERCIAL

## 2025-06-30 DIAGNOSIS — F41.9 ANXIETY DISORDER, UNSPECIFIED TYPE: ICD-10-CM

## 2025-06-30 DIAGNOSIS — F33.1 MODERATE EPISODE OF RECURRENT MAJOR DEPRESSIVE DISORDER (H): Primary | ICD-10-CM

## 2025-06-30 DIAGNOSIS — F90.0 ATTENTION DEFICIT HYPERACTIVITY DISORDER (ADHD), PREDOMINANTLY INATTENTIVE TYPE: ICD-10-CM

## 2025-06-30 PROCEDURE — 90837 PSYTX W PT 60 MINUTES: CPT | Mod: 95

## 2025-06-30 NOTE — PROGRESS NOTES
[facility]                                     Progress Note    Patient Name: Tracey Crawford  Date: 6/30/2025         Service Type: Individual      Session Start Time: 12:00 PM  Session End Time: 12:57 PM     Session Length: 53+ min    Session #: 20    Attendees: Client attended alone    Service Modality:  Video Visit:      Provider verified identity through the following two step process.  Patient provided:  Patient is known previously to provider    Telemedicine Visit: The patient's condition can be safely assessed and treated via synchronous audio and visual telemedicine encounter.      Reason for Telemedicine Visit: Patient has requested telehealth visit    Originating Site (Patient Location): Patient's home    Distant Site (Provider Location): Provider Remote Setting- Home Office    Consent:  The patient/guardian has verbally consented to: the potential risks and benefits of telemedicine (video visit) versus in person care; bill my insurance or make self-payment for services provided; and responsibility for payment of non-covered services.     Patient would like the video invitation sent by:  My Chart    Mode of Communication:  Video Conference via Amwell    Distant Location (Provider):  Off-site    As the provider I attest to compliance with applicable laws and regulations related to telemedicine.    DATA  Extended Session (53+ minutes): PROLONGED SERVICE IN THE OUTPATIENT SETTING REQUIRING DIRECT (FACE-TO-FACE) PATIENT CONTACT BEYOND THE USUAL SERVICE:    - Patient's presenting concerns require more intensive intervention than could be completed within the usual service  Interactive Complexity: No  Crisis: No        Progress Since Last Session (Related to Symptoms / Goals / Homework):   Symptoms: No change based on patient self-report.     Homework: Partially completed      Episode of Care Goals: Satisfactory progress - ACTION (Actively working towards change); Intervened by reinforcing  change plan / affirming steps taken     Current / Ongoing Stressors and Concerns:   Patient is a parent and a wife. Patient is currently unemployed. Patient was diagnosed with ADHD in childhood. Patient reports concerns about executive functioning tasks. Patient reports concerns about emotion regulation. Patient reports concerns about routines. Patient reports concerns about sensory sensitivities. Patient reports concerns about self-judgments. Patient reports concerns about perfectionism. Patient reports concerns about finances. Patient reports concerns about an internal conflict.      Treatment Objective(s) Addressed in This Session:   Patient will learn about the diagnosis and symptoms of ADHD.   Patient will learn 5 skills to help increase executive functioning skills.   Client will identify triggers of anxiety and process them in session.    Patient will create and implement daily and weekly routines.   Patient will use daily planner 50% of the time.         Intervention:   Motivational Interviewing: supported patient in processing and exploring an internal conflict.  Validated patient's emotions. Supported patient in processing and exploring the urge to avoid this conversation with her cousin. Taught about rupture and repair in relationships. Discussed writing out her interpersonal script before having this conversation and being mindful of whether this helps to reduce avoidance. Reviewed practicing validating her own emotions. Discussed the difference between validating a behavior versus an emotion. Supported patient in processing and exploring how she has been able to use her planner regularly. Validated patient's use of skills and discussed continuing to use her planner on a daily basis.     Assessments completed prior to visit:  The following assessments were completed by patient for this visit:  PHQ9:       1/28/2025     9:53 PM 2/21/2025     8:00 AM 3/20/2025    12:00 PM 4/24/2025     2:00 PM 5/8/2025      1:00 PM 6/3/2025     1:00 PM 6/16/2025     6:29 AM   PHQ-9 SCORE   PHQ-9 Total Score MyChart 0      8 (Mild depression)   PHQ-9 Total Score 0  8 8 4 6 7 8        Patient-reported     GAD7:       1/24/2025     9:33 AM 2/21/2025     8:00 AM 3/20/2025    12:00 PM 4/24/2025     2:00 PM 5/8/2025     1:00 PM 6/3/2025     2:00 PM 6/16/2025     6:31 AM   LOGAN-7 SCORE   Total Score 9 (mild anxiety)      12 (moderate anxiety)   Total Score 9  12 13 10 13 9 12        Patient-reported     PROMIS 10-Global Health (only subscores and total score):       12/2/2024     2:06 PM 1/24/2025    10:03 AM 5/8/2025     1:59 PM   PROMIS-10 Scores Only   Global Mental Health Score 7  9 13   Global Physical Health Score 14  16 18   PROMIS TOTAL - SUBSCORES 21  25 31       Patient-reported         ASSESSMENT: Current Emotional / Mental Status (status of significant symptoms):   Risk status (Self / Other harm or suicidal ideation)   Patient denies current fears or concerns for personal safety.   Patient denies current or recent suicidal ideation or behaviors.   Patient denies current or recent homicidal ideation or behaviors.   Patient denies current or recent self injurious behavior or ideation.   Patient denies other safety concerns.   Patient reports there has been no change in risk factors since their last session.     Patient reports there has been no change in protective factors since their last session.     Recommended that patient call 911 or go to the local ED should there be a change in any of these risk factors     Appearance:   Appropriate    Eye Contact:   Good    Psychomotor Behavior: Normal    Attitude:   Cooperative    Orientation:   All   Speech    Rate / Production: Normal     Volume:  Normal    Mood:    Anxious  Depressed  Sad    Affect:    Worrisome    Thought Content:  Rumination    Thought Form:  Coherent    Insight:    Good      Medication Review:   No changes to current psychiatric medication(s)     Medication  Compliance:   Yes     Changes in Health Issues:   None reported     Chemical Use Review:   Substance Use: Chemical use reviewed, no active concerns identified      Tobacco Use: No current tobacco use.      Diagnosis:  1. Moderate episode of recurrent major depressive disorder (H)    2. Attention deficit hyperactivity disorder (ADHD), predominantly inattentive type    3. Anxiety disorder, unspecified type        Collateral Reports Completed:   Not Applicable    PLAN: (Patient Tasks / Therapist Tasks / Other)  Patient will practice validating her own emotions. Patient will practice using her planner on a daily basis. Patient will write out her interpersonal script for her conversation with her cousin. We will discuss next session.       Virginie Rosado, [facility] 6/30/2025    __________________________________________________________________    Individual Treatment Plan    Patient's Name: Tracey Crawford  YOB: 1990    Date of Creation: 1/24/2025  Date Treatment Plan Last Reviewed/Revised: 5/8/2025  DSM5 Diagnoses: Attention-Deficit/Hyperactivity Disorder  314.00 (F90.0) Predominantly inattentive presentation, 296.32 (F33.1) Major Depressive Disorder, Recurrent Episode, Moderate _, or 300.09 (F41.8) Other Specified Anxiety Disorder   Psychosocial / Contextual Factors: Patient is a parent and a wife. Patient is currently unemployed.  PROMIS (reviewed every 90 days): The following assessments were completed by patient for this visit:  PROMIS 10-Global Health (only subscores and total score):       12/2/2024     2:06 PM 1/24/2025    10:03 AM 5/8/2025     1:59 PM   PROMIS-10 Scores Only   Global Mental Health Score 7  9 13   Global Physical Health Score 14  16 18   PROMIS TOTAL - SUBSCORES 21  25 31       Patient-reported       Referral / Collaboration:  Referral to another professional/service is not indicated at this time..    Anticipated number of session for this episode of care: 75  Anticipation frequency of  session: Every other week  Anticipated Duration of each session: 53 or more minutes  Treatment plan will be reviewed in 90 days or when goals have been changed.       MeasurableTreatment Goal(s) related to diagnosis / functional impairment(s)  Goal 1:  Patient will work on stabilizing symptoms of ADHD.    I will know I've met my goal when visually my house will be  and being able to keep up on this and when I'm late less days.       Objective #A  Patient will learn about the diagnosis and symptoms of ADHD.   Status: Continued - Date: 5/8/2025     Intervention(s)  Therapist will teach about the diagnosis of ADHD.     Objective #B  Patient will use daily planner 50% of the time.                         Status: Continued - Date: 5/8/2025     Intervention(s)  Therapist will teach organizational skills.     Objective #C  Patient will learn 5 skills to help increase executive functioning skills.   Status: Continued - Date: 5/8/2025    Intervention(s)  Therapist will assign homework of using coping skills to increase executive functioning skills.    Objective #D  Patient will create and implement daily and weekly routines.   Status: Continued - Date: 5/8/2025     Intervention(s)  Therapist will teach about how routines help support ADHD and assign homework to create and implement routines.       Goal 2: Client will work on stabilizing anxiety symptoms as evidenced by LOGAN-7 scores (current is 13) and Self report.  Goal is to reduce by five points.      I will know I've met my goal when I'm not over thinking things as much.      Objective #A Client will identify triggers of anxiety and process them in session.    Status: Continued - Date: 5/8/2025    Intervention(s)  Therapist will teach emotional recognition/identification by assigning homework to journal with written exercises.    Objective #B  Client will identify initial signs or symptoms of anxiety.    Status: Continued - Date:  5/8/2025    Intervention(s)  Therapist will teach emotional regulation skills, such as deep breathing and mindfulness skills.    Objective #C  Client will learn 5 new coping skills to reduce symptoms anxiety.   Status: Continued - Date: 5/8/2025    Intervention(s)  Therapist will provide educational materials on distress tolerance skills and other copings skills.    Objective #D  Client will learn about how ADHD and anxiety are co-morbid.   Status: Continued - Date: 5/8/2025    Intervention(s)  Therapist will provide educational materials on how ADHD and anxiety disorders can impact each other.      Goal 3: Client will work on stabilizing depressive symptoms as evidenced by PHQ scores (current is 4) and Self report.  Goal is to maintain a score under 5 points.     I will know I've met my goal when my self-confidence increases.      Objective #A    Client will bring to session stressors since last visit to process and formulate coping mechanisms.  Status: Continued - Date: 5/8/2025    Intervention(s)  Therapist will provide educational materials on distress tolerance skills and other copings skills.     Intervention(s)  Therapist will bring to each session thought patterns that contribute to stress and depression, and develop cognitive restructuring techniques to help manage these thought distortions.     Objective #B  Client will incorporate self-care in everyday life to manage physical and mental health.   Status: Continued - Date: 5/8/2025    Intervention(s)  Therapist will process through with success and failures related to self-care activities.    Objective #C  Client will reduce feeling bad about herself by being more aware of cognitive distortions.  Status: Continued - Date: 5/8/2025    Intervention(s)  Therapist will teach about cognitive distortions, specifically patterns, and look at ways to be more aware of thoughts.        Patient has reviewed and agreed to the above plan.      Virginie Rosado, [facility]  January  24, 2025 Reviewed 5/8/2025

## 2025-07-08 ENCOUNTER — VIRTUAL VISIT (OUTPATIENT)
Dept: PSYCHOLOGY | Facility: CLINIC | Age: 35
End: 2025-07-08
Payer: COMMERCIAL

## 2025-07-08 DIAGNOSIS — F90.0 ATTENTION DEFICIT HYPERACTIVITY DISORDER (ADHD), PREDOMINANTLY INATTENTIVE TYPE: ICD-10-CM

## 2025-07-08 DIAGNOSIS — F41.9 ANXIETY DISORDER, UNSPECIFIED TYPE: ICD-10-CM

## 2025-07-08 DIAGNOSIS — F33.1 MODERATE EPISODE OF RECURRENT MAJOR DEPRESSIVE DISORDER (H): Primary | ICD-10-CM

## 2025-07-08 PROCEDURE — 90837 PSYTX W PT 60 MINUTES: CPT | Mod: 95

## 2025-07-08 NOTE — PROGRESS NOTES
[facility]                                     Progress Note    Patient Name: Tracey Crawford  Date: 7/8/2025         Service Type: Individual      Session Start Time: 1:00 PM  Session End Time: 1:54 PM     Session Length: 53+ min    Session #: 21    Attendees: Client attended alone    Service Modality:  Video Visit:      Provider verified identity through the following two step process.  Patient provided:  Patient is known previously to provider    Telemedicine Visit: The patient's condition can be safely assessed and treated via synchronous audio and visual telemedicine encounter.      Reason for Telemedicine Visit: Patient has requested telehealth visit    Originating Site (Patient Location): Patient's home    Distant Site (Provider Location): Provider Remote Setting- Home Office    Consent:  The patient/guardian has verbally consented to: the potential risks and benefits of telemedicine (video visit) versus in person care; bill my insurance or make self-payment for services provided; and responsibility for payment of non-covered services.     Patient would like the video invitation sent by:  My Chart    Mode of Communication:  Video Conference via [employer]    Distant Location (Provider):  Off-site    As the provider I attest to compliance with applicable laws and regulations related to telemedicine.    DATA  Extended Session (53+ minutes): PROLONGED SERVICE IN THE OUTPATIENT SETTING REQUIRING DIRECT (FACE-TO-FACE) PATIENT CONTACT BEYOND THE USUAL SERVICE:    - Patient's presenting concerns require more intensive intervention than could be completed within the usual service  Interactive Complexity: No  Crisis: No        Progress Since Last Session (Related to Symptoms / Goals / Homework):   Symptoms: No change based on patient self-report.     Homework: Partially completed      Episode of Care Goals: Satisfactory progress - ACTION (Actively working towards change); Intervened by reinforcing  change plan / affirming steps taken     Current / Ongoing Stressors and Concerns:   Patient is a parent and a wife. Patient is currently unemployed. Patient was diagnosed with ADHD in childhood. Patient reports concerns about executive functioning tasks. Patient reports concerns about emotion regulation. Patient reports concerns about routines. Patient reports concerns about sensory sensitivities. Patient reports concerns about self-judgments. Patient reports concerns about perfectionism. Patient reports concerns about finances. Patient reports concerns about an internal conflict.      Treatment Objective(s) Addressed in This Session:   Patient will learn about the diagnosis and symptoms of ADHD.   Patient will learn 5 skills to help increase executive functioning skills.   Client will identify triggers of anxiety and process them in session.    Patient will create and implement daily and weekly routines.   Patient will use daily planner 50% of the time.         Intervention:   Motivational Interviewing: supported patient in processing and exploring an internal conflict.  Supported patient in processing and exploring boundaries she has set with her mom. Taught broken record skill and role played a conversation where patient uses her skills to maintain her boundaries with her mom. Supported patient in identifying her emotions about the conflict and reviewed practicing validating her own emotions. Supported patient in processing and exploring feeling responsible for her mom's emotions. Discussed care taking behaviors and discussed practicing bringing awareness to when she is care taking her mom. Discussed being mindful of how effective this is.     Assessments completed prior to visit:  The following assessments were completed by patient for this visit:  PHQ9:       1/28/2025     9:53 PM 2/21/2025     8:00 AM 3/20/2025    12:00 PM 4/24/2025     2:00 PM 5/8/2025     1:00 PM 6/3/2025     1:00 PM 6/16/2025     6:29 AM    PHQ-9 SCORE   PHQ-9 Total Score Ingrishart 0      8 (Mild depression)   PHQ-9 Total Score 0  8 8 4 6 7 8        Patient-reported     GAD7:       1/24/2025     9:33 AM 2/21/2025     8:00 AM 3/20/2025    12:00 PM 4/24/2025     2:00 PM 5/8/2025     1:00 PM 6/3/2025     2:00 PM 6/16/2025     6:31 AM   LOGAN-7 SCORE   Total Score 9 (mild anxiety)      12 (moderate anxiety)   Total Score 9  12 13 10 13 9 12        Patient-reported     PROMIS 10-Global Health (only subscores and total score):       12/2/2024     2:06 PM 1/24/2025    10:03 AM 5/8/2025     1:59 PM   PROMIS-10 Scores Only   Global Mental Health Score 7  9 13   Global Physical Health Score 14  16 18   PROMIS TOTAL - SUBSCORES 21  25 31       Patient-reported         ASSESSMENT: Current Emotional / Mental Status (status of significant symptoms):   Risk status (Self / Other harm or suicidal ideation)   Patient denies current fears or concerns for personal safety.   Patient denies current or recent suicidal ideation or behaviors.   Patient denies current or recent homicidal ideation or behaviors.   Patient denies current or recent self injurious behavior or ideation.   Patient denies other safety concerns.   Patient reports there has been no change in risk factors since their last session.     Patient reports there has been no change in protective factors since their last session.     Recommended that patient call 911 or go to the local ED should there be a change in any of these risk factors     Appearance:   Appropriate    Eye Contact:   Good    Psychomotor Behavior: Normal    Attitude:   Cooperative    Orientation:   All   Speech    Rate / Production: Normal     Volume:  Normal    Mood:    Anxious  Depressed  Sad    Affect:    Worrisome    Thought Content:  Rumination    Thought Form:  Coherent    Insight:    Good      Medication Review:   No changes to current psychiatric medication(s)     Medication Compliance:   Yes     Changes in Health Issues:   None  reported     Chemical Use Review:   Substance Use: Chemical use reviewed, no active concerns identified      Tobacco Use: No current tobacco use.      Diagnosis:  1. Moderate episode of recurrent major depressive disorder (H)    2. Attention deficit hyperactivity disorder (ADHD), predominantly inattentive type    3. Anxiety disorder, unspecified type        Collateral Reports Completed:   Not Applicable    PLAN: (Patient Tasks / Therapist Tasks / Other)  Patient will practice validating her own emotions. Patient will practice using broken record skill to maintain her boundaries with her mom. Patient will practice awareness of care taking behaviors related to her mom. We will discuss next session.       Virginie Rosado, [facility] 7/8/2025    __________________________________________________________________    Individual Treatment Plan    Patient's Name: Tracey Crawford  YOB: 1990    Date of Creation: 1/24/2025  Date Treatment Plan Last Reviewed/Revised: 5/8/2025  DSM5 Diagnoses: Attention-Deficit/Hyperactivity Disorder  314.00 (F90.0) Predominantly inattentive presentation, 296.32 (F33.1) Major Depressive Disorder, Recurrent Episode, Moderate _, or 300.09 (F41.8) Other Specified Anxiety Disorder   Psychosocial / Contextual Factors: Patient is a parent and a wife. Patient is currently unemployed.  PROMIS (reviewed every 90 days): The following assessments were completed by patient for this visit:  PROMIS 10-Global Health (only subscores and total score):       12/2/2024     2:06 PM 1/24/2025    10:03 AM 5/8/2025     1:59 PM   PROMIS-10 Scores Only   Global Mental Health Score 7  9 13   Global Physical Health Score 14  16 18   PROMIS TOTAL - SUBSCORES 21  25 31       Patient-reported       Referral / Collaboration:  Referral to another professional/service is not indicated at this time..    Anticipated number of session for this episode of care: 75  Anticipation frequency of session: Every other week  Anticipated  Duration of each session: 53 or more minutes  Treatment plan will be reviewed in 90 days or when goals have been changed.       MeasurableTreatment Goal(s) related to diagnosis / functional impairment(s)  Goal 1:  Patient will work on stabilizing symptoms of ADHD.    I will know I've met my goal when visually my house will be  and being able to keep up on this and when I'm late less days.       Objective #A  Patient will learn about the diagnosis and symptoms of ADHD.   Status: Continued - Date: 5/8/2025     Intervention(s)  Therapist will teach about the diagnosis of ADHD.     Objective #B  Patient will use daily planner 50% of the time.                         Status: Continued - Date: 5/8/2025     Intervention(s)  Therapist will teach organizational skills.     Objective #C  Patient will learn 5 skills to help increase executive functioning skills.   Status: Continued - Date: 5/8/2025    Intervention(s)  Therapist will assign homework of using coping skills to increase executive functioning skills.    Objective #D  Patient will create and implement daily and weekly routines.   Status: Continued - Date: 5/8/2025     Intervention(s)  Therapist will teach about how routines help support ADHD and assign homework to create and implement routines.       Goal 2: Client will work on stabilizing anxiety symptoms as evidenced by LOGAN-7 scores (current is 13) and Self report.  Goal is to reduce by five points.      I will know I've met my goal when I'm not over thinking things as much.      Objective #A Client will identify triggers of anxiety and process them in session.    Status: Continued - Date: 5/8/2025    Intervention(s)  Therapist will teach emotional recognition/identification by assigning homework to journal with written exercises.    Objective #B  Client will identify initial signs or symptoms of anxiety.    Status: Continued - Date: 5/8/2025    Intervention(s)  Therapist will teach emotional regulation  skills, such as deep breathing and mindfulness skills.    Objective #C  Client will learn 5 new coping skills to reduce symptoms anxiety.   Status: Continued - Date: 5/8/2025    Intervention(s)  Therapist will provide educational materials on distress tolerance skills and other copings skills.    Objective #D  Client will learn about how ADHD and anxiety are co-morbid.   Status: Continued - Date: 5/8/2025    Intervention(s)  Therapist will provide educational materials on how ADHD and anxiety disorders can impact each other.      Goal 3: Client will work on stabilizing depressive symptoms as evidenced by PHQ scores (current is 4) and Self report.  Goal is to maintain a score under 5 points.     I will know I've met my goal when my self-confidence increases.      Objective #A    Client will bring to session stressors since last visit to process and formulate coping mechanisms.  Status: Continued - Date: 5/8/2025    Intervention(s)  Therapist will provide educational materials on distress tolerance skills and other copings skills.     Intervention(s)  Therapist will bring to each session thought patterns that contribute to stress and depression, and develop cognitive restructuring techniques to help manage these thought distortions.     Objective #B  Client will incorporate self-care in everyday life to manage physical and mental health.   Status: Continued - Date: 5/8/2025    Intervention(s)  Therapist will process through with success and failures related to self-care activities.    Objective #C  Client will reduce feeling bad about herself by being more aware of cognitive distortions.  Status: Continued - Date: 5/8/2025    Intervention(s)  Therapist will teach about cognitive distortions, specifically patterns, and look at ways to be more aware of thoughts.        Patient has reviewed and agreed to the above plan.      SEAN Haines  January 24, 2025 Reviewed 5/8/2025

## 2025-07-14 ENCOUNTER — TELEPHONE (OUTPATIENT)
Dept: PSYCHOLOGY | Facility: CLINIC | Age: 35
End: 2025-07-14
Payer: COMMERCIAL

## 2025-07-14 NOTE — TELEPHONE ENCOUNTER
Provider called patient to offer an available session. Provider reminded patient of her next scheduled session on 7/22.

## 2025-07-22 ENCOUNTER — VIRTUAL VISIT (OUTPATIENT)
Dept: PSYCHOLOGY | Facility: CLINIC | Age: 35
End: 2025-07-22
Payer: COMMERCIAL

## 2025-07-22 DIAGNOSIS — F33.1 MODERATE EPISODE OF RECURRENT MAJOR DEPRESSIVE DISORDER (H): Primary | ICD-10-CM

## 2025-07-22 DIAGNOSIS — F41.9 ANXIETY DISORDER, UNSPECIFIED TYPE: ICD-10-CM

## 2025-07-22 DIAGNOSIS — F90.0 ATTENTION DEFICIT HYPERACTIVITY DISORDER (ADHD), PREDOMINANTLY INATTENTIVE TYPE: ICD-10-CM

## 2025-07-22 PROCEDURE — 90837 PSYTX W PT 60 MINUTES: CPT | Mod: 95

## 2025-07-22 ASSESSMENT — ANXIETY QUESTIONNAIRES
7. FEELING AFRAID AS IF SOMETHING AWFUL MIGHT HAPPEN: MORE THAN HALF THE DAYS
2. NOT BEING ABLE TO STOP OR CONTROL WORRYING: SEVERAL DAYS
GAD7 TOTAL SCORE: 15
6. BECOMING EASILY ANNOYED OR IRRITABLE: NEARLY EVERY DAY
GAD7 TOTAL SCORE: 15
1. FEELING NERVOUS, ANXIOUS, OR ON EDGE: NEARLY EVERY DAY
IF YOU CHECKED OFF ANY PROBLEMS ON THIS QUESTIONNAIRE, HOW DIFFICULT HAVE THESE PROBLEMS MADE IT FOR YOU TO DO YOUR WORK, TAKE CARE OF THINGS AT HOME, OR GET ALONG WITH OTHER PEOPLE: VERY DIFFICULT
4. TROUBLE RELAXING: NEARLY EVERY DAY
3. WORRYING TOO MUCH ABOUT DIFFERENT THINGS: NEARLY EVERY DAY
5. BEING SO RESTLESS THAT IT IS HARD TO SIT STILL: NOT AT ALL

## 2025-07-22 ASSESSMENT — COLUMBIA-SUICIDE SEVERITY RATING SCALE - C-SSRS
SUICIDE, SINCE LAST CONTACT: NO
TOTAL  NUMBER OF ABORTED OR SELF INTERRUPTED ATTEMPTS SINCE LAST CONTACT: NO
1. SINCE LAST CONTACT, HAVE YOU WISHED YOU WERE DEAD OR WISHED YOU COULD GO TO SLEEP AND NOT WAKE UP?: NO
ATTEMPT SINCE LAST CONTACT: NO
TOTAL  NUMBER OF INTERRUPTED ATTEMPTS SINCE LAST CONTACT: NO
6. HAVE YOU EVER DONE ANYTHING, STARTED TO DO ANYTHING, OR PREPARED TO DO ANYTHING TO END YOUR LIFE?: NO
2. HAVE YOU ACTUALLY HAD ANY THOUGHTS OF KILLING YOURSELF?: NO

## 2025-07-22 ASSESSMENT — PATIENT HEALTH QUESTIONNAIRE - PHQ9: SUM OF ALL RESPONSES TO PHQ QUESTIONS 1-9: 14

## 2025-07-22 NOTE — PROGRESS NOTES
[facility]                                     Progress Note    Patient Name: Tracey Crawford  Date: 7/22/2025         Service Type: Individual      Session Start Time: 1:01 PM  Session End Time: 1:55 PM     Session Length: 53+ min    Session #: 22    Attendees: Client attended alone    Service Modality:  Video Visit:      Provider verified identity through the following two step process.  Patient provided:  Patient is known previously to provider    Telemedicine Visit: The patient's condition can be safely assessed and treated via synchronous audio and visual telemedicine encounter.      Reason for Telemedicine Visit: Patient has requested telehealth visit    Originating Site (Patient Location): Patient's home    Distant Site (Provider Location): Provider Remote Setting- Home Office    Consent:  The patient/guardian has verbally consented to: the potential risks and benefits of telemedicine (video visit) versus in person care; bill my insurance or make self-payment for services provided; and responsibility for payment of non-covered services.     Patient would like the video invitation sent by:  My Chart    Mode of Communication:  Video Conference via [employer]    Distant Location (Provider):  Off-site    As the provider I attest to compliance with applicable laws and regulations related to telemedicine.    DATA  Extended Session (53+ minutes): PROLONGED SERVICE IN THE OUTPATIENT SETTING REQUIRING DIRECT (FACE-TO-FACE) PATIENT CONTACT BEYOND THE USUAL SERVICE:    - Patient's presenting concerns require more intensive intervention than could be completed within the usual service  Interactive Complexity: No  Crisis: No        Progress Since Last Session (Related to Symptoms / Goals / Homework):   Symptoms: Worsening based on PHQ-9 and LOGAN-7 scores and patient self-report.    Homework: Partially completed      Episode of Care Goals: Satisfactory progress - ACTION (Actively working towards change);  Intervened by reinforcing change plan / affirming steps taken     Current / Ongoing Stressors and Concerns:   Patient is a parent and a wife. Patient is currently unemployed. Patient was diagnosed with ADHD in childhood. Patient reports concerns about executive functioning tasks. Patient reports concerns about emotion regulation. Patient reports concerns about routines. Patient reports concerns about sensory sensitivities. Patient reports concerns about self-judgments. Patient reports concerns about perfectionism. Patient reports concerns about finances. Patient reports concerns about an internal conflict.      Treatment Objective(s) Addressed in This Session:   Patient will learn about the diagnosis and symptoms of ADHD.   Patient will learn 5 skills to help increase executive functioning skills.   Client will identify triggers of anxiety and process them in session.    Patient will increase interpersonal effectiveness skills with family and friends to help manage conflict.    Patient will build upon the authentic self.         Patient will bring to session interpersonal conflicts to process and explore.        Intervention:   Motivational Interviewing: supported patient in processing and exploring an internal conflict.  Validated patient's emotions. Supported patient in processing and exploring her emotions about the conflict from her adult perspective. Supported patient in processing and exploring her experiencing of the conflict from her inner child's perspective. Supported patient in processing and exploring feeling protective of her younger self. Supported patient in processing and exploring what she would like to say to her mom. Discussed writing a letter to her mom to help process the emotions and thoughts she has about this conflict. Reviewed validating her own emotions. Discussed being mindful of how effective this is.   Reviewed treatment plan and goals with patient.      Assessments completed prior to  visit:  The following assessments were completed by patient for this visit:  PHQ9:       2/21/2025     8:00 AM 3/20/2025    12:00 PM 4/24/2025     2:00 PM 5/8/2025     1:00 PM 6/3/2025     1:00 PM 6/16/2025     6:29 AM 7/22/2025     1:00 PM   PHQ-9 SCORE   PHQ-9 Total Score MyChart      8 (Mild depression)    PHQ-9 Total Score 8 8 4 6 7 8  14       Patient-reported     GAD7:       2/21/2025     8:00 AM 3/20/2025    12:00 PM 4/24/2025     2:00 PM 5/8/2025     1:00 PM 6/3/2025     2:00 PM 6/16/2025     6:31 AM 7/22/2025     1:00 PM   LOGAN-7 SCORE   Total Score      12 (moderate anxiety)    Total Score 12 13 10 13 9 12  15       Patient-reported     PROMIS 10-Global Health (only subscores and total score):       12/2/2024     2:06 PM 1/24/2025    10:03 AM 5/8/2025     1:59 PM 7/22/2025     1:06 PM   PROMIS-10 Scores Only   Global Mental Health Score 7  9 13 9   Global Physical Health Score 14  16 18 16   PROMIS TOTAL - SUBSCORES 21  25 31 25       Patient-reported         ASSESSMENT: Current Emotional / Mental Status (status of significant symptoms):   Risk status (Self / Other harm or suicidal ideation)   Patient denies current fears or concerns for personal safety.   Patient denies current or recent suicidal ideation or behaviors.   Patient denies current or recent homicidal ideation or behaviors.   Patient denies current or recent self injurious behavior or ideation.   Patient denies other safety concerns.   Patient reports there has been no change in risk factors since their last session.     Patient reports there has been no change in protective factors since their last session.     Recommended that patient call 911 or go to the local ED should there be a change in any of these risk factors     Appearance:   Appropriate    Eye Contact:   Good    Psychomotor Behavior: Normal    Attitude:   Cooperative    Orientation:   All   Speech    Rate / Production: Normal     Volume:  Normal    Mood:    Anxious  Depressed  Sad     Affect:    Worrisome    Thought Content:  Rumination    Thought Form:  Coherent    Insight:    Good      Medication Review:   No changes to current psychiatric medication(s)     Medication Compliance:   Yes     Changes in Health Issues:   None reported     Chemical Use Review:   Substance Use: Chemical use reviewed, no active concerns identified      Tobacco Use: No current tobacco use.      Diagnosis:  1. Moderate episode of recurrent major depressive disorder (H)    2. Attention deficit hyperactivity disorder (ADHD), predominantly inattentive type    3. Anxiety disorder, unspecified type        Collateral Reports Completed:   Not Applicable    PLAN: (Patient Tasks / Therapist Tasks / Other)  Patient will practice validating her own emotions. Patient will write a letter to her mom to help process her emotions and thoughts about this conflict.  We will discuss next session.       Virginie Rosado, [facility] 7/22/2025    __________________________________________________________________    Individual Treatment Plan    Patient's Name: Tracey Crawford  YOB: 1990    Date of Creation: 1/24/2025  Date Treatment Plan Last Reviewed/Revised: 7/22/2025  DSM5 Diagnoses: Attention-Deficit/Hyperactivity Disorder  314.00 (F90.0) Predominantly inattentive presentation, 296.32 (F33.1) Major Depressive Disorder, Recurrent Episode, Moderate _, or 300.09 (F41.8) Other Specified Anxiety Disorder   Psychosocial / Contextual Factors: Patient is a parent and a wife. Patient is currently unemployed.  PROMIS (reviewed every 90 days): The following assessments were completed by patient for this visit:  PROMIS 10-Global Health (only subscores and total score):       12/2/2024     2:06 PM 1/24/2025    10:03 AM 5/8/2025     1:59 PM 7/22/2025     1:06 PM   PROMIS-10 Scores Only   Global Mental Health Score 7  9 13 9   Global Physical Health Score 14  16 18 16   PROMIS TOTAL - SUBSCORES 21  25 31 25       Patient-reported       Referral /  Collaboration:  Referral to another professional/service is not indicated at this time..    Anticipated number of session for this episode of care: 75  Anticipation frequency of session: Every other week  Anticipated Duration of each session: 53 or more minutes  Treatment plan will be reviewed in 90 days or when goals have been changed.       MeasurableTreatment Goal(s) related to diagnosis / functional impairment(s)  Goal 1:  Patient will work on stabilizing symptoms of ADHD.    I will know I've met my goal when visually my house will be  and being able to keep up on this and when I'm late less days.       Objective #A  Patient will learn about the diagnosis and symptoms of ADHD.   Status: Continued - Date: 7/22/2025     Intervention(s)  Therapist will teach about the diagnosis of ADHD.     Objective #B  Patient will use daily planner 50% of the time.                         Status: Continued - Date: 7/22/2025     Intervention(s)  Therapist will teach organizational skills.     Objective #C  Patient will learn 5 skills to help increase executive functioning skills.   Status: Continued - Date: 7/22/2025    Intervention(s)  Therapist will assign homework of using coping skills to increase executive functioning skills.    Objective #D  Patient will create and implement daily and weekly routines.   Status: Continued - Date: 7/22/2025     Intervention(s)  Therapist will teach about how routines help support ADHD and assign homework to create and implement routines.       Goal 2: Client will work on stabilizing anxiety symptoms as evidenced by LOGAN-7 scores (current is 15) and Self report.  Goal is to reduce by five points.      I will know I've met my goal when I'm not over thinking things as much.      Objective #A Client will identify triggers of anxiety and process them in session.    Status: Continued - Date: 7/22/2025    Intervention(s)  Therapist will teach emotional recognition/identification by assigning  homework to journal with written exercises.    Objective #B  Client will identify initial signs or symptoms of anxiety.    Status: Continued - Date: 7/22/2025    Intervention(s)  Therapist will teach emotional regulation skills, such as deep breathing and mindfulness skills.    Objective #C  Client will learn 5 new coping skills to reduce symptoms anxiety.   Status: Continued - Date: 7/22/2025    Intervention(s)  Therapist will provide educational materials on distress tolerance skills and other copings skills.    Objective #D  Client will learn about how ADHD and anxiety are co-morbid.   Status: Continued - Date: 7/22/2025    Intervention(s)  Therapist will provide educational materials on how ADHD and anxiety disorders can impact each other.      Goal 3: Client will work on stabilizing depressive symptoms as evidenced by PHQ scores (current is 14) and Self report.  Goal is to reduce this score by five points.    I will know I've met my goal when my self-confidence increases.      Objective #A    Client will bring to session stressors since last visit to process and formulate coping mechanisms.  Status: Continued - Date: 7/22/2025    Intervention(s)  Therapist will bring to each session thought patterns that contribute to stress and depression, and develop cognitive restructuring techniques to help manage these thought distortions.     Objective #B  Client will incorporate self-care in everyday life to manage physical and mental health.   Status: Continued - Date: 7/22/2025    Intervention(s)  Therapist will process through with success and failures related to self-care activities.    Objective #C  Client will reduce feeling bad about herself by being more aware of cognitive distortions.  Status: Continued - Date: 7/22/2025    Intervention(s)  Therapist will teach about cognitive distortions, specifically patterns, and look at ways to be more aware of thoughts.    Goal 4: Patient will increase effectiveness of  interpersonal communication skills.     I will know I've met my goal when I can say what I feel.       Objective #A  Patient will increase interpersonal effectiveness skills with family and friends to help manage conflict.    Status: New - Date: 7/22/2025      Intervention(s)  Therapist will role-play conflict management.     Objective #B  Patient will build upon the authentic self.         Status: New - Date: 7/22/2025      Intervention(s)  Therapist will assign homework through written exercises.     Objective #C  Patient will bring to session interpersonal conflicts to process and explore.   Status: New - Date: 7/22/2025      Intervention(s)  Therapist will teach about healthy boundaries related to interpersonal relationships.          Patient has reviewed and agreed to the above plan.      SEAN Haines  January 24, 2025 Reviewed 5/8/2025 Re-reviewed 7/22/2025

## 2025-07-29 ENCOUNTER — VIRTUAL VISIT (OUTPATIENT)
Dept: PSYCHOLOGY | Facility: CLINIC | Age: 35
End: 2025-07-29
Payer: COMMERCIAL

## 2025-07-29 DIAGNOSIS — F41.9 ANXIETY DISORDER, UNSPECIFIED TYPE: ICD-10-CM

## 2025-07-29 DIAGNOSIS — F90.0 ATTENTION DEFICIT HYPERACTIVITY DISORDER (ADHD), PREDOMINANTLY INATTENTIVE TYPE: ICD-10-CM

## 2025-07-29 DIAGNOSIS — F33.1 MODERATE EPISODE OF RECURRENT MAJOR DEPRESSIVE DISORDER (H): Primary | ICD-10-CM

## 2025-07-29 PROCEDURE — 90837 PSYTX W PT 60 MINUTES: CPT | Mod: 95

## 2025-07-29 NOTE — PROGRESS NOTES
[facility]                                     Progress Note    Patient Name: Tracey Crawford  Date: 7/29/2025         Service Type: Individual      Session Start Time: 12:00 PM  Session End Time: 12:54 PM     Session Length: 53+ min    Session #: 23    Attendees: Client attended alone    Service Modality:  Video Visit:      Provider verified identity through the following two step process.  Patient provided:  Patient is known previously to provider    Telemedicine Visit: The patient's condition can be safely assessed and treated via synchronous audio and visual telemedicine encounter.      Reason for Telemedicine Visit: Patient has requested telehealth visit    Originating Site (Patient Location): Patient's home    Distant Site (Provider Location): Provider Remote Setting- Home Office    Consent:  The patient/guardian has verbally consented to: the potential risks and benefits of telemedicine (video visit) versus in person care; bill my insurance or make self-payment for services provided; and responsibility for payment of non-covered services.     Patient would like the video invitation sent by:  My Chart    Mode of Communication:  Video Conference via [employer]    Distant Location (Provider):  Off-site    As the provider I attest to compliance with applicable laws and regulations related to telemedicine.    DATA  Extended Session (53+ minutes): PROLONGED SERVICE IN THE OUTPATIENT SETTING REQUIRING DIRECT (FACE-TO-FACE) PATIENT CONTACT BEYOND THE USUAL SERVICE:    - Patient's presenting concerns require more intensive intervention than could be completed within the usual service  Interactive Complexity: No  Crisis: No        Progress Since Last Session (Related to Symptoms / Goals / Homework):   Symptoms: No change based on patient self-report.     Homework: Partially completed      Episode of Care Goals: Satisfactory progress - ACTION (Actively working towards change); Intervened by reinforcing  change plan / affirming steps taken     Current / Ongoing Stressors and Concerns:   Patient is a parent and a wife. Patient is currently unemployed. Patient was diagnosed with ADHD in childhood. Patient reports concerns about executive functioning tasks. Patient reports concerns about emotion regulation. Patient reports concerns about routines. Patient reports concerns about sensory sensitivities. Patient reports concerns about self-judgments. Patient reports concerns about perfectionism. Patient reports concerns about finances. Patient reports concerns about an internal conflict.      Treatment Objective(s) Addressed in This Session:   Patient will learn about the diagnosis and symptoms of ADHD.   Patient will learn 5 skills to help increase executive functioning skills.   Client will identify triggers of anxiety and process them in session.    Patient will increase interpersonal effectiveness skills with family and friends to help manage conflict.    Patient will build upon the authentic self.         Patient will bring to session interpersonal conflicts to process and explore.        Intervention:   Motivational Interviewing: supported patient in processing and exploring an internal conflict.  Validated patient's emotions. Supported patient in processing and exploring what she needs to heal from this rupture in her relationship with her mom. Supported patient in processing and exploring accountability. Supported patient in processing and exploring barriers to writing her mom the letter. Discussed taking time to write out the letter to continue processing this conflict. Reviewed how all emotions are valid and discussed practicing validating her own emotions. Discussed being mindful of how it feels to validate her own emotions.     Assessments completed prior to visit:  The following assessments were completed by patient for this visit:  PHQ9:       2/21/2025     8:00 AM 3/20/2025    12:00 PM 4/24/2025     2:00 PM  5/8/2025     1:00 PM 6/3/2025     1:00 PM 6/16/2025     6:29 AM 7/22/2025     1:00 PM   PHQ-9 SCORE   PHQ-9 Total Score MyChart      8 (Mild depression)    PHQ-9 Total Score 8 8 4 6 7 8  14       Patient-reported     GAD7:       2/21/2025     8:00 AM 3/20/2025    12:00 PM 4/24/2025     2:00 PM 5/8/2025     1:00 PM 6/3/2025     2:00 PM 6/16/2025     6:31 AM 7/22/2025     1:00 PM   LOGAN-7 SCORE   Total Score      12 (moderate anxiety)    Total Score 12 13 10 13 9 12  15       Patient-reported     PROMIS 10-Global Health (only subscores and total score):       12/2/2024     2:06 PM 1/24/2025    10:03 AM 5/8/2025     1:59 PM 7/22/2025     1:06 PM   PROMIS-10 Scores Only   Global Mental Health Score 7  9 13 9   Global Physical Health Score 14  16 18 16   PROMIS TOTAL - SUBSCORES 21  25 31 25       Patient-reported         ASSESSMENT: Current Emotional / Mental Status (status of significant symptoms):   Risk status (Self / Other harm or suicidal ideation)   Patient denies current fears or concerns for personal safety.   Patient denies current or recent suicidal ideation or behaviors.   Patient denies current or recent homicidal ideation or behaviors.   Patient denies current or recent self injurious behavior or ideation.   Patient denies other safety concerns.   Patient reports there has been no change in risk factors since their last session.     Patient reports there has been no change in protective factors since their last session.     Recommended that patient call 911 or go to the local ED should there be a change in any of these risk factors     Appearance:   Appropriate    Eye Contact:   Good    Psychomotor Behavior: Normal    Attitude:   Cooperative    Orientation:   All   Speech    Rate / Production: Normal     Volume:  Normal    Mood:    Anxious  Depressed  Sad    Affect:    Worrisome    Thought Content:  Rumination    Thought Form:  Coherent    Insight:    Good      Medication Review:   No changes to current  psychiatric medication(s)     Medication Compliance:   Yes     Changes in Health Issues:   None reported     Chemical Use Review:   Substance Use: Chemical use reviewed, no active concerns identified      Tobacco Use: No current tobacco use.      Diagnosis:  1. Moderate episode of recurrent major depressive disorder (H)    2. Attention deficit hyperactivity disorder (ADHD), predominantly inattentive type    3. Anxiety disorder, unspecified type        Collateral Reports Completed:   Not Applicable    PLAN: (Patient Tasks / Therapist Tasks / Other)  Patient will practice validating her own emotions. Patient will write a letter to her mom to help process her emotions and thoughts about this conflict.  We will discuss next session.       Virginie Rosado, [facility] 7/29/2025    __________________________________________________________________    Individual Treatment Plan    Patient's Name: Tracey Crawford  YOB: 1990    Date of Creation: 1/24/2025  Date Treatment Plan Last Reviewed/Revised: 7/22/2025  DSM5 Diagnoses: Attention-Deficit/Hyperactivity Disorder  314.00 (F90.0) Predominantly inattentive presentation, 296.32 (F33.1) Major Depressive Disorder, Recurrent Episode, Moderate _, or 300.09 (F41.8) Other Specified Anxiety Disorder   Psychosocial / Contextual Factors: Patient is a parent and a wife. Patient is currently unemployed.  PROMIS (reviewed every 90 days): The following assessments were completed by patient for this visit:  PROMIS 10-Global Health (only subscores and total score):       12/2/2024     2:06 PM 1/24/2025    10:03 AM 5/8/2025     1:59 PM 7/22/2025     1:06 PM   PROMIS-10 Scores Only   Global Mental Health Score 7  9 13 9   Global Physical Health Score 14  16 18 16   PROMIS TOTAL - SUBSCORES 21  25 31 25       Patient-reported       Referral / Collaboration:  Referral to another professional/service is not indicated at this time..    Anticipated number of session for this episode of care:  75  Anticipation frequency of session: Every other week  Anticipated Duration of each session: 53 or more minutes  Treatment plan will be reviewed in 90 days or when goals have been changed.       MeasurableTreatment Goal(s) related to diagnosis / functional impairment(s)  Goal 1:  Patient will work on stabilizing symptoms of ADHD.    I will know I've met my goal when visually my house will be  and being able to keep up on this and when I'm late less days.       Objective #A  Patient will learn about the diagnosis and symptoms of ADHD.   Status: Continued - Date: 7/22/2025     Intervention(s)  Therapist will teach about the diagnosis of ADHD.     Objective #B  Patient will use daily planner 50% of the time.                         Status: Continued - Date: 7/22/2025     Intervention(s)  Therapist will teach organizational skills.     Objective #C  Patient will learn 5 skills to help increase executive functioning skills.   Status: Continued - Date: 7/22/2025    Intervention(s)  Therapist will assign homework of using coping skills to increase executive functioning skills.    Objective #D  Patient will create and implement daily and weekly routines.   Status: Continued - Date: 7/22/2025     Intervention(s)  Therapist will teach about how routines help support ADHD and assign homework to create and implement routines.       Goal 2: Client will work on stabilizing anxiety symptoms as evidenced by LOGAN-7 scores (current is 15) and Self report.  Goal is to reduce by five points.      I will know I've met my goal when I'm not over thinking things as much.      Objective #A Client will identify triggers of anxiety and process them in session.    Status: Continued - Date: 7/22/2025    Intervention(s)  Therapist will teach emotional recognition/identification by assigning homework to journal with written exercises.    Objective #B  Client will identify initial signs or symptoms of anxiety.    Status: Continued - Date:  7/22/2025    Intervention(s)  Therapist will teach emotional regulation skills, such as deep breathing and mindfulness skills.    Objective #C  Client will learn 5 new coping skills to reduce symptoms anxiety.   Status: Continued - Date: 7/22/2025    Intervention(s)  Therapist will provide educational materials on distress tolerance skills and other copings skills.    Objective #D  Client will learn about how ADHD and anxiety are co-morbid.   Status: Continued - Date: 7/22/2025    Intervention(s)  Therapist will provide educational materials on how ADHD and anxiety disorders can impact each other.      Goal 3: Client will work on stabilizing depressive symptoms as evidenced by PHQ scores (current is 14) and Self report.  Goal is to reduce this score by five points.    I will know I've met my goal when my self-confidence increases.      Objective #A    Client will bring to session stressors since last visit to process and formulate coping mechanisms.  Status: Continued - Date: 7/22/2025    Intervention(s)  Therapist will bring to each session thought patterns that contribute to stress and depression, and develop cognitive restructuring techniques to help manage these thought distortions.     Objective #B  Client will incorporate self-care in everyday life to manage physical and mental health.   Status: Continued - Date: 7/22/2025    Intervention(s)  Therapist will process through with success and failures related to self-care activities.    Objective #C  Client will reduce feeling bad about herself by being more aware of cognitive distortions.  Status: Continued - Date: 7/22/2025    Intervention(s)  Therapist will teach about cognitive distortions, specifically patterns, and look at ways to be more aware of thoughts.    Goal 4: Patient will increase effectiveness of interpersonal communication skills.     I will know I've met my goal when I can say what I feel.       Objective #A  Patient will increase interpersonal  effectiveness skills with family and friends to help manage conflict.    Status: New - Date: 7/22/2025      Intervention(s)  Therapist will role-play conflict management.     Objective #B  Patient will build upon the authentic self.         Status: New - Date: 7/22/2025      Intervention(s)  Therapist will assign homework through written exercises.     Objective #C  Patient will bring to session interpersonal conflicts to process and explore.   Status: New - Date: 7/22/2025      Intervention(s)  Therapist will teach about healthy boundaries related to interpersonal relationships.          Patient has reviewed and agreed to the above plan.      SEAN Haines  January 24, 2025 Reviewed 5/8/2025 Re-reviewed 7/22/2025

## 2025-08-07 ENCOUNTER — VIRTUAL VISIT (OUTPATIENT)
Dept: PSYCHOLOGY | Facility: CLINIC | Age: 35
End: 2025-08-07
Payer: COMMERCIAL

## 2025-08-07 DIAGNOSIS — F41.9 ANXIETY DISORDER, UNSPECIFIED TYPE: ICD-10-CM

## 2025-08-07 DIAGNOSIS — F33.1 MODERATE EPISODE OF RECURRENT MAJOR DEPRESSIVE DISORDER (H): Primary | ICD-10-CM

## 2025-08-07 DIAGNOSIS — F90.0 ATTENTION DEFICIT HYPERACTIVITY DISORDER (ADHD), PREDOMINANTLY INATTENTIVE TYPE: ICD-10-CM

## 2025-08-11 ENCOUNTER — TELEPHONE (OUTPATIENT)
Dept: PSYCHOLOGY | Facility: CLINIC | Age: 35
End: 2025-08-11
Payer: COMMERCIAL

## 2025-08-19 ENCOUNTER — VIRTUAL VISIT (OUTPATIENT)
Dept: PSYCHOLOGY | Facility: CLINIC | Age: 35
End: 2025-08-19
Payer: COMMERCIAL

## 2025-08-19 DIAGNOSIS — F41.9 ANXIETY DISORDER, UNSPECIFIED TYPE: ICD-10-CM

## 2025-08-19 DIAGNOSIS — F90.0 ATTENTION DEFICIT HYPERACTIVITY DISORDER (ADHD), PREDOMINANTLY INATTENTIVE TYPE: ICD-10-CM

## 2025-08-19 DIAGNOSIS — F33.1 MODERATE EPISODE OF RECURRENT MAJOR DEPRESSIVE DISORDER (H): Primary | ICD-10-CM

## 2025-08-19 PROCEDURE — 90837 PSYTX W PT 60 MINUTES: CPT | Mod: 95

## 2025-08-19 ASSESSMENT — ANXIETY QUESTIONNAIRES
GAD7 TOTAL SCORE: 12
5. BEING SO RESTLESS THAT IT IS HARD TO SIT STILL: NOT AT ALL
2. NOT BEING ABLE TO STOP OR CONTROL WORRYING: MORE THAN HALF THE DAYS
7. FEELING AFRAID AS IF SOMETHING AWFUL MIGHT HAPPEN: NOT AT ALL
GAD7 TOTAL SCORE: 12
4. TROUBLE RELAXING: SEVERAL DAYS
IF YOU CHECKED OFF ANY PROBLEMS ON THIS QUESTIONNAIRE, HOW DIFFICULT HAVE THESE PROBLEMS MADE IT FOR YOU TO DO YOUR WORK, TAKE CARE OF THINGS AT HOME, OR GET ALONG WITH OTHER PEOPLE: SOMEWHAT DIFFICULT
3. WORRYING TOO MUCH ABOUT DIFFERENT THINGS: NEARLY EVERY DAY
6. BECOMING EASILY ANNOYED OR IRRITABLE: NEARLY EVERY DAY
1. FEELING NERVOUS, ANXIOUS, OR ON EDGE: NEARLY EVERY DAY

## 2025-08-19 ASSESSMENT — PATIENT HEALTH QUESTIONNAIRE - PHQ9: SUM OF ALL RESPONSES TO PHQ QUESTIONS 1-9: 13

## 2025-08-26 ENCOUNTER — VIRTUAL VISIT (OUTPATIENT)
Dept: PSYCHOLOGY | Facility: CLINIC | Age: 35
End: 2025-08-26
Payer: COMMERCIAL

## 2025-08-26 DIAGNOSIS — F41.9 ANXIETY DISORDER, UNSPECIFIED TYPE: ICD-10-CM

## 2025-08-26 DIAGNOSIS — F33.1 MODERATE EPISODE OF RECURRENT MAJOR DEPRESSIVE DISORDER (H): Primary | ICD-10-CM

## 2025-08-26 DIAGNOSIS — F90.0 ATTENTION DEFICIT HYPERACTIVITY DISORDER (ADHD), PREDOMINANTLY INATTENTIVE TYPE: ICD-10-CM

## 2025-08-26 PROCEDURE — 90837 PSYTX W PT 60 MINUTES: CPT | Mod: 95

## 2025-08-26 ASSESSMENT — PATIENT HEALTH QUESTIONNAIRE - PHQ9
10. IF YOU CHECKED OFF ANY PROBLEMS, HOW DIFFICULT HAVE THESE PROBLEMS MADE IT FOR YOU TO DO YOUR WORK, TAKE CARE OF THINGS AT HOME, OR GET ALONG WITH OTHER PEOPLE: SOMEWHAT DIFFICULT
SUM OF ALL RESPONSES TO PHQ QUESTIONS 1-9: 10
SUM OF ALL RESPONSES TO PHQ QUESTIONS 1-9: 10

## 2025-08-26 ASSESSMENT — ANXIETY QUESTIONNAIRES
7. FEELING AFRAID AS IF SOMETHING AWFUL MIGHT HAPPEN: SEVERAL DAYS
2. NOT BEING ABLE TO STOP OR CONTROL WORRYING: SEVERAL DAYS
8. IF YOU CHECKED OFF ANY PROBLEMS, HOW DIFFICULT HAVE THESE MADE IT FOR YOU TO DO YOUR WORK, TAKE CARE OF THINGS AT HOME, OR GET ALONG WITH OTHER PEOPLE?: SOMEWHAT DIFFICULT
4. TROUBLE RELAXING: SEVERAL DAYS
7. FEELING AFRAID AS IF SOMETHING AWFUL MIGHT HAPPEN: SEVERAL DAYS
5. BEING SO RESTLESS THAT IT IS HARD TO SIT STILL: SEVERAL DAYS
IF YOU CHECKED OFF ANY PROBLEMS ON THIS QUESTIONNAIRE, HOW DIFFICULT HAVE THESE PROBLEMS MADE IT FOR YOU TO DO YOUR WORK, TAKE CARE OF THINGS AT HOME, OR GET ALONG WITH OTHER PEOPLE: SOMEWHAT DIFFICULT
GAD7 TOTAL SCORE: 10
1. FEELING NERVOUS, ANXIOUS, OR ON EDGE: SEVERAL DAYS
GAD7 TOTAL SCORE: 10
3. WORRYING TOO MUCH ABOUT DIFFERENT THINGS: MORE THAN HALF THE DAYS
GAD7 TOTAL SCORE: 10
6. BECOMING EASILY ANNOYED OR IRRITABLE: NEARLY EVERY DAY

## 2025-09-03 ENCOUNTER — VIRTUAL VISIT (OUTPATIENT)
Dept: PSYCHOLOGY | Facility: CLINIC | Age: 35
End: 2025-09-03
Payer: COMMERCIAL

## 2025-09-03 DIAGNOSIS — F90.0 ATTENTION DEFICIT HYPERACTIVITY DISORDER (ADHD), PREDOMINANTLY INATTENTIVE TYPE: ICD-10-CM

## 2025-09-03 DIAGNOSIS — F41.9 ANXIETY DISORDER, UNSPECIFIED TYPE: ICD-10-CM

## 2025-09-03 DIAGNOSIS — F33.1 MODERATE EPISODE OF RECURRENT MAJOR DEPRESSIVE DISORDER (H): Primary | ICD-10-CM

## 2025-09-03 PROCEDURE — 90834 PSYTX W PT 45 MINUTES: CPT | Mod: 95
